# Patient Record
Sex: FEMALE | Race: OTHER | HISPANIC OR LATINO | URBAN - METROPOLITAN AREA
[De-identification: names, ages, dates, MRNs, and addresses within clinical notes are randomized per-mention and may not be internally consistent; named-entity substitution may affect disease eponyms.]

---

## 2019-03-20 ENCOUNTER — EMERGENCY (EMERGENCY)
Facility: HOSPITAL | Age: 84
LOS: 1 days | Discharge: HOME | End: 2019-03-20
Attending: EMERGENCY MEDICINE
Payer: MEDICARE

## 2019-03-20 VITALS
SYSTOLIC BLOOD PRESSURE: 156 MMHG | RESPIRATION RATE: 18 BRPM | DIASTOLIC BLOOD PRESSURE: 90 MMHG | HEART RATE: 92 BPM | TEMPERATURE: 98 F | OXYGEN SATURATION: 95 %

## 2019-03-20 VITALS — WEIGHT: 130.07 LBS | HEIGHT: 60 IN

## 2019-03-20 PROCEDURE — 73564 X-RAY EXAM KNEE 4 OR MORE: CPT | Mod: 26,RT

## 2019-03-20 PROCEDURE — 99284 EMERGENCY DEPT VISIT MOD MDM: CPT

## 2019-03-20 PROCEDURE — 73564 X-RAY EXAM KNEE 4 OR MORE: CPT

## 2019-03-20 PROCEDURE — 99283 EMERGENCY DEPT VISIT LOW MDM: CPT | Mod: 25

## 2019-03-20 RX ORDER — ACETAMINOPHEN 500 MG
650 TABLET ORAL ONCE
Qty: 0 | Refills: 0 | Status: COMPLETED | OUTPATIENT
Start: 2019-03-20 | End: 2019-03-20

## 2019-03-20 RX ADMIN — Medication 650 MILLIGRAM(S): at 15:01

## 2019-03-20 NOTE — ED PROVIDER NOTE - CLINICAL SUMMARY MEDICAL DECISION MAKING FREE TEXT BOX
89 y/o F with a PMHx of HTN and seizures presents to the ED with R knee pain s/p fall out of bed x 5 days. Will check xray, give pain control, and reassess. Pt last took Tylenol x 0800 this morning.

## 2019-03-20 NOTE — ED ADULT NURSE NOTE - NSIMPLEMENTINTERV_GEN_ALL_ED
Implemented All Universal Safety Interventions:  [place] to call system. Call bell, personal items and telephone within reach. Instruct patient to call for assistance. Room bathroom lighting operational. Non-slip footwear when patient is off stretcher. Physically safe environment: no spills, clutter or unnecessary equipment. Stretcher in lowest position, wheels locked, appropriate side rails in place.

## 2019-03-20 NOTE — ED PROVIDER NOTE - OBJECTIVE STATEMENT
89 y/o F with a significant PMHx of HTN and seizures, on Amlodipine, Benazapril, Vimpat, and Centraline, and no significant PSHx presents to the ED with c/o R knee pain x 5 days. Pt states she fell forward while getting out of bed. Pt notes she has been taking Tylenol for the pain, but with no relief. Pt reports he last dose was 0800 this morning. Pt endorses she usually walks around with a walker. Pt denies head injury, pain elsewhere, or any other complaints. NKDA.

## 2019-03-20 NOTE — ED PROVIDER NOTE - NSFOLLOWUPINSTRUCTIONS_ED_ALL_ED_FT
-- Please use 650-1000mg Tylenol (also called acetaminophen) every 6 hours and/or 400-600mg Motrin (also called Advil or ibuprofen) every 6 hours as needed for pain/discomfort/swelling. You can get these without a prescription. Don't use more than 3000mg of Tylenol in any 24-hour period. Make sure your other prescription/over-the-counter medications don't contain any Tylenol so you don't take too much. If you have any stomach discomfort while taking Motrin, you can use TUMS or Pepcid or Zantac (these can also be bought without a prescription).

## 2021-12-10 ENCOUNTER — EMERGENCY (EMERGENCY)
Facility: HOSPITAL | Age: 86
LOS: 1 days | Discharge: HOME | End: 2021-12-10
Attending: EMERGENCY MEDICINE
Payer: MEDICARE

## 2021-12-10 VITALS
HEART RATE: 94 BPM | TEMPERATURE: 99 F | DIASTOLIC BLOOD PRESSURE: 94 MMHG | HEIGHT: 60 IN | WEIGHT: 125 LBS | OXYGEN SATURATION: 97 % | SYSTOLIC BLOOD PRESSURE: 175 MMHG | RESPIRATION RATE: 18 BRPM

## 2021-12-10 VITALS
OXYGEN SATURATION: 96 % | RESPIRATION RATE: 18 BRPM | DIASTOLIC BLOOD PRESSURE: 73 MMHG | SYSTOLIC BLOOD PRESSURE: 115 MMHG | HEART RATE: 74 BPM | TEMPERATURE: 97 F

## 2021-12-10 LAB
ALBUMIN SERPL ELPH-MCNC: 3.4 G/DL — LOW (ref 3.5–5)
ALP SERPL-CCNC: 98 U/L — SIGNIFICANT CHANGE UP (ref 40–120)
ALT FLD-CCNC: 21 U/L DA — SIGNIFICANT CHANGE UP (ref 10–60)
ANION GAP SERPL CALC-SCNC: 5 MMOL/L — SIGNIFICANT CHANGE UP (ref 5–17)
AST SERPL-CCNC: 18 U/L — SIGNIFICANT CHANGE UP (ref 10–40)
BASOPHILS # BLD AUTO: 0.02 K/UL — SIGNIFICANT CHANGE UP (ref 0–0.2)
BASOPHILS NFR BLD AUTO: 0.4 % — SIGNIFICANT CHANGE UP (ref 0–2)
BILIRUB SERPL-MCNC: 0.6 MG/DL — SIGNIFICANT CHANGE UP (ref 0.2–1.2)
BUN SERPL-MCNC: 19 MG/DL — HIGH (ref 7–18)
CALCIUM SERPL-MCNC: 8.7 MG/DL — SIGNIFICANT CHANGE UP (ref 8.4–10.5)
CHLORIDE SERPL-SCNC: 109 MMOL/L — HIGH (ref 96–108)
CO2 SERPL-SCNC: 27 MMOL/L — SIGNIFICANT CHANGE UP (ref 22–31)
CREAT SERPL-MCNC: 0.84 MG/DL — SIGNIFICANT CHANGE UP (ref 0.5–1.3)
EOSINOPHIL # BLD AUTO: 0.1 K/UL — SIGNIFICANT CHANGE UP (ref 0–0.5)
EOSINOPHIL NFR BLD AUTO: 2.1 % — SIGNIFICANT CHANGE UP (ref 0–6)
GLUCOSE SERPL-MCNC: 95 MG/DL — SIGNIFICANT CHANGE UP (ref 70–99)
HCT VFR BLD CALC: 39.9 % — SIGNIFICANT CHANGE UP (ref 34.5–45)
HGB BLD-MCNC: 12.9 G/DL — SIGNIFICANT CHANGE UP (ref 11.5–15.5)
IMM GRANULOCYTES NFR BLD AUTO: 0.2 % — SIGNIFICANT CHANGE UP (ref 0–1.5)
LYMPHOCYTES # BLD AUTO: 1.17 K/UL — SIGNIFICANT CHANGE UP (ref 1–3.3)
LYMPHOCYTES # BLD AUTO: 24.5 % — SIGNIFICANT CHANGE UP (ref 13–44)
MCHC RBC-ENTMCNC: 27.9 PG — SIGNIFICANT CHANGE UP (ref 27–34)
MCHC RBC-ENTMCNC: 32.3 GM/DL — SIGNIFICANT CHANGE UP (ref 32–36)
MCV RBC AUTO: 86.4 FL — SIGNIFICANT CHANGE UP (ref 80–100)
MONOCYTES # BLD AUTO: 0.4 K/UL — SIGNIFICANT CHANGE UP (ref 0–0.9)
MONOCYTES NFR BLD AUTO: 8.4 % — SIGNIFICANT CHANGE UP (ref 2–14)
NEUTROPHILS # BLD AUTO: 3.07 K/UL — SIGNIFICANT CHANGE UP (ref 1.8–7.4)
NEUTROPHILS NFR BLD AUTO: 64.4 % — SIGNIFICANT CHANGE UP (ref 43–77)
NRBC # BLD: 0 /100 WBCS — SIGNIFICANT CHANGE UP (ref 0–0)
PLATELET # BLD AUTO: 122 K/UL — LOW (ref 150–400)
POTASSIUM SERPL-MCNC: 4.7 MMOL/L — SIGNIFICANT CHANGE UP (ref 3.5–5.3)
POTASSIUM SERPL-SCNC: 4.7 MMOL/L — SIGNIFICANT CHANGE UP (ref 3.5–5.3)
PROT SERPL-MCNC: 6.9 G/DL — SIGNIFICANT CHANGE UP (ref 6–8.3)
RBC # BLD: 4.62 M/UL — SIGNIFICANT CHANGE UP (ref 3.8–5.2)
RBC # FLD: 13.6 % — SIGNIFICANT CHANGE UP (ref 10.3–14.5)
SARS-COV-2 RNA SPEC QL NAA+PROBE: SIGNIFICANT CHANGE UP
SODIUM SERPL-SCNC: 141 MMOL/L — SIGNIFICANT CHANGE UP (ref 135–145)
TROPONIN I, HIGH SENSITIVITY RESULT: 9.8 NG/L — SIGNIFICANT CHANGE UP
WBC # BLD: 4.77 K/UL — SIGNIFICANT CHANGE UP (ref 3.8–10.5)
WBC # FLD AUTO: 4.77 K/UL — SIGNIFICANT CHANGE UP (ref 3.8–10.5)

## 2021-12-10 PROCEDURE — 80053 COMPREHEN METABOLIC PANEL: CPT

## 2021-12-10 PROCEDURE — 99285 EMERGENCY DEPT VISIT HI MDM: CPT

## 2021-12-10 PROCEDURE — 71045 X-RAY EXAM CHEST 1 VIEW: CPT | Mod: 26

## 2021-12-10 PROCEDURE — 84484 ASSAY OF TROPONIN QUANT: CPT

## 2021-12-10 PROCEDURE — 36415 COLL VENOUS BLD VENIPUNCTURE: CPT

## 2021-12-10 PROCEDURE — 93005 ELECTROCARDIOGRAM TRACING: CPT

## 2021-12-10 PROCEDURE — 87635 SARS-COV-2 COVID-19 AMP PRB: CPT

## 2021-12-10 PROCEDURE — 85025 COMPLETE CBC W/AUTO DIFF WBC: CPT

## 2021-12-10 PROCEDURE — 99284 EMERGENCY DEPT VISIT MOD MDM: CPT | Mod: 25

## 2021-12-10 PROCEDURE — 71045 X-RAY EXAM CHEST 1 VIEW: CPT

## 2021-12-10 NOTE — ED PROVIDER NOTE - PROGRESS NOTE DETAILS
Patient is resting comfortably, NAD. remains asymptomatic. Extensive discussion with daughter re: plan of care. Offered admission. Daughter declined. I recommended repeat troponin as an alternative. Daughter declined. Daughter understands that patient has not had a full cardiac evaluation and may still have a serious medical condition that could lead to permanent disability or death. Will take patient to PMD on Monday. Return to the ED immediately if getting worse, not improving, or if having any new or troubling symptoms.

## 2021-12-10 NOTE — ED PROVIDER NOTE - NSFOLLOWUPINSTRUCTIONS_ED_ALL_ED_FT
Chest Pain    WHAT YOU NEED TO KNOW:    Chest pain can be caused by a range of conditions, from not serious to life-threatening. Chest pain can be a symptom of a digestive problem, such as acid reflux or a stomach ulcer. An anxiety attack or a strong emotion, such as anger, can also cause chest pain. Infection, inflammation, or a fracture in the bones or cartilage in your chest can cause pain or discomfort. Sometimes chest pain or pressure is caused by poor blood flow to your heart (angina). Chest pain may also be caused by life-threatening conditions such as a heart attack or blood clot in your lungs.    DISCHARGE INSTRUCTIONS:    Call your local emergency number (911 in the [place]) or have someone call if:   •You have any of the following signs of a heart attack: ?Squeezing, pressure, or pain in your chest      ?You may also have any of the following: ?Discomfort or pain in your back, neck, jaw, stomach, or arm      ?Shortness of breath      ?Nausea or vomiting      ?Lightheadedness or a sudden cold sweat            Return to the emergency department if:   •You have chest discomfort that gets worse, even with medicine.      •You cough or vomit blood.      •Your bowel movements are black or bloody.      •You cannot stop vomiting, or it hurts to swallow.      Call your doctor if:   •You have questions or concerns about your condition or care.          Medicines:   •Medicines may be given to treat the cause of your chest pain. Examples include pain medicine, anxiety medicine, or medicines to increase blood flow to your heart.      •Do not take certain medicines without asking your healthcare provider first. These include NSAIDs, herbal or vitamin supplements, or hormones (estrogen or progestin).      •Take your medicine as directed. Contact your healthcare provider if you think your medicine is not helping or if you have side effects. Tell him or her if you are allergic to any medicine. Keep a list of the medicines, vitamins, and herbs you take. Include the amounts, and when and why you take them. Bring the list or the pill bottles to follow-up visits. Carry your medicine list with you in case of an emergency.      Healthy living tips: The following are general healthy guidelines. If the cause of your chest pain is known, your healthcare provider will give you specific guidelines to follow.  •Do not smoke. Nicotine and other chemicals in cigarettes and cigars can cause lung and heart damage. Ask your healthcare provider for information if you currently smoke and need help to quit. E-cigarettes or smokeless tobacco still contain nicotine. Talk to your healthcare provider before you use these products.      •Choose a variety of healthy foods as often as possible. Include fresh, frozen, or canned fruits and vegetables. Also include low-fat dairy products, fish, chicken (without skin), and lean meats. Your healthcare provider or a dietitian can help you create meal plans. You may need to avoid certain foods or drinks if your pain is caused by a digestion problem.  Healthy Foods           •Lower your sodium (salt) intake. Limit foods that are high in sodium, such as canned foods, salty snacks, and cold cuts. If you add salt when you cook food, do not add more at the table. Choose low-sodium canned foods as much as possible.             •Drink plenty of water every day. Water helps your body to control your temperature and blood pressure. Ask your healthcare provider how much water you should drink every day.      •Ask about activity. Your healthcare provider will tell you which activities to limit or avoid. Ask when you can drive, return to work, and have sex. Ask about the best exercise plan for you.      •Maintain a healthy weight. Ask your healthcare provider what a healthy weight is for you. Ask him or her to help you create a safe weight loss plan if you are overweight.      •Ask about vaccines you may need. Get the influenza (flu) vaccine every year as soon as recommended, usually in September or October. You may also need a pneumococcal vaccine to prevent pneumonia. The vaccine is usually given every 5 years, starting at age 65. Your healthcare provider can tell you if should get other vaccines, and when to get them.      Follow up with your healthcare provider within 72 hours, or as directed: You may need to return for more tests to find the cause of your chest pain. You may be referred to a specialist, such as a cardiologist or gastroenterologist. Write down your questions so you remember to ask them during your visits.       © Copyright [employer] 2021           back to top                          © Copyright [employer] 2021

## 2021-12-10 NOTE — ED PROVIDER NOTE - OBJECTIVE STATEMENT
patient's daughter reports patient work up with retrosternal chest pain this morning, moderate, lasted about 15 minutes. Patient burped several times afterwards, and pain resolved. Pain returned, mild, around 3pm, lasted about 5 minutes, also with burping and passing gas. No fever, sob, ap, n/v/d, diaphoresis. Currently asymptomatic. Ate normally today.

## 2021-12-10 NOTE — ED PROVIDER NOTE - PATIENT PORTAL LINK FT
You can access the FollowMyHealth Patient Portal offered by [facility] by registering at the following website: [email or web address]. By joining "[employer]’s FollowMyHealth portal, you will also be able to view your health information using other applications (apps) compatible with our system.

## 2021-12-11 PROBLEM — G40.909 EPILEPSY, UNSPECIFIED, NOT INTRACTABLE, WITHOUT STATUS EPILEPTICUS: Chronic | Status: ACTIVE | Noted: 2019-03-20

## 2021-12-11 PROBLEM — I10 ESSENTIAL (PRIMARY) HYPERTENSION: Chronic | Status: ACTIVE | Noted: 2019-03-20

## 2023-01-12 ENCOUNTER — INPATIENT (INPATIENT)
Facility: HOSPITAL | Age: 88
LOS: 4 days | Discharge: HOME | DRG: 177 | End: 2023-01-17
Attending: INTERNAL MEDICINE | Admitting: INTERNAL MEDICINE
Payer: MEDICARE

## 2023-01-12 VITALS
HEIGHT: 55 IN | OXYGEN SATURATION: 100 % | WEIGHT: 110.01 LBS | RESPIRATION RATE: 18 BRPM | TEMPERATURE: 99 F | DIASTOLIC BLOOD PRESSURE: 70 MMHG | SYSTOLIC BLOOD PRESSURE: 107 MMHG | HEART RATE: 110 BPM

## 2023-01-12 DIAGNOSIS — J96.01 ACUTE RESPIRATORY FAILURE WITH HYPOXIA: ICD-10-CM

## 2023-01-12 DIAGNOSIS — U07.1 COVID-19: ICD-10-CM

## 2023-01-12 DIAGNOSIS — I48.92 UNSPECIFIED ATRIAL FLUTTER: ICD-10-CM

## 2023-01-12 DIAGNOSIS — I10 ESSENTIAL (PRIMARY) HYPERTENSION: ICD-10-CM

## 2023-01-12 DIAGNOSIS — Z29.9 ENCOUNTER FOR PROPHYLACTIC MEASURES, UNSPECIFIED: ICD-10-CM

## 2023-01-12 DIAGNOSIS — F41.9 ANXIETY DISORDER, UNSPECIFIED: ICD-10-CM

## 2023-01-12 DIAGNOSIS — I48.91 UNSPECIFIED ATRIAL FIBRILLATION: ICD-10-CM

## 2023-01-12 LAB
ALBUMIN SERPL ELPH-MCNC: 3.2 G/DL — LOW (ref 3.5–5)
ALP SERPL-CCNC: 94 U/L — SIGNIFICANT CHANGE UP (ref 40–120)
ALT FLD-CCNC: 22 U/L DA — SIGNIFICANT CHANGE UP (ref 10–60)
ANION GAP SERPL CALC-SCNC: 5 MMOL/L — SIGNIFICANT CHANGE UP (ref 5–17)
AST SERPL-CCNC: 20 U/L — SIGNIFICANT CHANGE UP (ref 10–40)
BASOPHILS # BLD AUTO: 0.01 K/UL — SIGNIFICANT CHANGE UP (ref 0–0.2)
BASOPHILS NFR BLD AUTO: 0.2 % — SIGNIFICANT CHANGE UP (ref 0–2)
BILIRUB SERPL-MCNC: 0.7 MG/DL — SIGNIFICANT CHANGE UP (ref 0.2–1.2)
BUN SERPL-MCNC: 23 MG/DL — HIGH (ref 7–18)
CALCIUM SERPL-MCNC: 8.3 MG/DL — LOW (ref 8.4–10.5)
CHLORIDE SERPL-SCNC: 109 MMOL/L — HIGH (ref 96–108)
CO2 SERPL-SCNC: 25 MMOL/L — SIGNIFICANT CHANGE UP (ref 22–31)
CREAT SERPL-MCNC: 0.81 MG/DL — SIGNIFICANT CHANGE UP (ref 0.5–1.3)
EGFR: 67 ML/MIN/1.73M2 — SIGNIFICANT CHANGE UP
EOSINOPHIL # BLD AUTO: 0.05 K/UL — SIGNIFICANT CHANGE UP (ref 0–0.5)
EOSINOPHIL NFR BLD AUTO: 1.1 % — SIGNIFICANT CHANGE UP (ref 0–6)
FLUAV AG NPH QL: SIGNIFICANT CHANGE UP
FLUBV AG NPH QL: SIGNIFICANT CHANGE UP
GLUCOSE SERPL-MCNC: 99 MG/DL — SIGNIFICANT CHANGE UP (ref 70–99)
HCT VFR BLD CALC: 35 % — SIGNIFICANT CHANGE UP (ref 34.5–45)
HGB BLD-MCNC: 11.1 G/DL — LOW (ref 11.5–15.5)
IMM GRANULOCYTES NFR BLD AUTO: 0.2 % — SIGNIFICANT CHANGE UP (ref 0–0.9)
LACTATE SERPL-SCNC: 1.2 MMOL/L — SIGNIFICANT CHANGE UP (ref 0.7–2)
LYMPHOCYTES # BLD AUTO: 0.41 K/UL — LOW (ref 1–3.3)
LYMPHOCYTES # BLD AUTO: 8.7 % — LOW (ref 13–44)
MAGNESIUM SERPL-MCNC: 2.1 MG/DL — SIGNIFICANT CHANGE UP (ref 1.6–2.6)
MCHC RBC-ENTMCNC: 27.3 PG — SIGNIFICANT CHANGE UP (ref 27–34)
MCHC RBC-ENTMCNC: 31.7 GM/DL — LOW (ref 32–36)
MCV RBC AUTO: 86 FL — SIGNIFICANT CHANGE UP (ref 80–100)
MONOCYTES # BLD AUTO: 0.28 K/UL — SIGNIFICANT CHANGE UP (ref 0–0.9)
MONOCYTES NFR BLD AUTO: 6 % — SIGNIFICANT CHANGE UP (ref 2–14)
NEUTROPHILS # BLD AUTO: 3.94 K/UL — SIGNIFICANT CHANGE UP (ref 1.8–7.4)
NEUTROPHILS NFR BLD AUTO: 83.8 % — HIGH (ref 43–77)
NRBC # BLD: 0 /100 WBCS — SIGNIFICANT CHANGE UP (ref 0–0)
NT-PROBNP SERPL-SCNC: 2401 PG/ML — HIGH (ref 0–450)
PLATELET # BLD AUTO: 67 K/UL — LOW (ref 150–400)
POTASSIUM SERPL-MCNC: 3.9 MMOL/L — SIGNIFICANT CHANGE UP (ref 3.5–5.3)
POTASSIUM SERPL-SCNC: 3.9 MMOL/L — SIGNIFICANT CHANGE UP (ref 3.5–5.3)
PROT SERPL-MCNC: 5.9 G/DL — LOW (ref 6–8.3)
RBC # BLD: 4.07 M/UL — SIGNIFICANT CHANGE UP (ref 3.8–5.2)
RBC # FLD: 13.8 % — SIGNIFICANT CHANGE UP (ref 10.3–14.5)
SARS-COV-2 RNA SPEC QL NAA+PROBE: DETECTED
SODIUM SERPL-SCNC: 139 MMOL/L — SIGNIFICANT CHANGE UP (ref 135–145)
T3 SERPL-MCNC: 83 NG/DL — SIGNIFICANT CHANGE UP (ref 80–200)
T4 AB SER-ACNC: 6.7 UG/DL — SIGNIFICANT CHANGE UP (ref 4.6–12)
TROPONIN I, HIGH SENSITIVITY RESULT: 51.1 NG/L — SIGNIFICANT CHANGE UP
TSH SERPL-MCNC: 1.66 UU/ML — SIGNIFICANT CHANGE UP (ref 0.34–4.82)
WBC # BLD: 4.7 K/UL — SIGNIFICANT CHANGE UP (ref 3.8–10.5)
WBC # FLD AUTO: 4.7 K/UL — SIGNIFICANT CHANGE UP (ref 3.8–10.5)

## 2023-01-12 PROCEDURE — 99285 EMERGENCY DEPT VISIT HI MDM: CPT

## 2023-01-12 PROCEDURE — 71045 X-RAY EXAM CHEST 1 VIEW: CPT | Mod: 26

## 2023-01-12 PROCEDURE — 93010 ELECTROCARDIOGRAM REPORT: CPT

## 2023-01-12 RX ORDER — REMDESIVIR 5 MG/ML
200 INJECTION INTRAVENOUS EVERY 24 HOURS
Refills: 0 | Status: COMPLETED | OUTPATIENT
Start: 2023-01-12 | End: 2023-01-12

## 2023-01-12 RX ORDER — ENOXAPARIN SODIUM 100 MG/ML
40 INJECTION SUBCUTANEOUS EVERY 24 HOURS
Refills: 0 | Status: DISCONTINUED | OUTPATIENT
Start: 2023-01-12 | End: 2023-01-12

## 2023-01-12 RX ORDER — PANTOPRAZOLE SODIUM 20 MG/1
40 TABLET, DELAYED RELEASE ORAL
Refills: 0 | Status: DISCONTINUED | OUTPATIENT
Start: 2023-01-12 | End: 2023-01-17

## 2023-01-12 RX ORDER — CEFTRIAXONE 500 MG/1
1000 INJECTION, POWDER, FOR SOLUTION INTRAMUSCULAR; INTRAVENOUS ONCE
Refills: 0 | Status: COMPLETED | OUTPATIENT
Start: 2023-01-12 | End: 2023-01-12

## 2023-01-12 RX ORDER — ENOXAPARIN SODIUM 100 MG/ML
50 INJECTION SUBCUTANEOUS ONCE
Refills: 0 | Status: DISCONTINUED | OUTPATIENT
Start: 2023-01-12 | End: 2023-01-12

## 2023-01-12 RX ORDER — ONDANSETRON 8 MG/1
4 TABLET, FILM COATED ORAL EVERY 8 HOURS
Refills: 0 | Status: DISCONTINUED | OUTPATIENT
Start: 2023-01-12 | End: 2023-01-17

## 2023-01-12 RX ORDER — REMDESIVIR 5 MG/ML
INJECTION INTRAVENOUS
Refills: 0 | Status: DISCONTINUED | OUTPATIENT
Start: 2023-01-12 | End: 2023-01-13

## 2023-01-12 RX ORDER — OXYBUTYNIN CHLORIDE 5 MG
10 TABLET ORAL DAILY
Refills: 0 | Status: DISCONTINUED | OUTPATIENT
Start: 2023-01-12 | End: 2023-01-12

## 2023-01-12 RX ORDER — REMDESIVIR 5 MG/ML
100 INJECTION INTRAVENOUS EVERY 24 HOURS
Refills: 0 | Status: DISCONTINUED | OUTPATIENT
Start: 2023-01-13 | End: 2023-01-13

## 2023-01-12 RX ORDER — AZITHROMYCIN 500 MG/1
500 TABLET, FILM COATED ORAL ONCE
Refills: 0 | Status: COMPLETED | OUTPATIENT
Start: 2023-01-12 | End: 2023-01-12

## 2023-01-12 RX ORDER — OXYBUTYNIN CHLORIDE 5 MG
10 TABLET ORAL DAILY
Refills: 0 | Status: DISCONTINUED | OUTPATIENT
Start: 2023-01-12 | End: 2023-01-17

## 2023-01-12 RX ORDER — SODIUM CHLORIDE 9 MG/ML
1000 INJECTION INTRAMUSCULAR; INTRAVENOUS; SUBCUTANEOUS
Refills: 0 | Status: DISCONTINUED | OUTPATIENT
Start: 2023-01-12 | End: 2023-01-17

## 2023-01-12 RX ORDER — SERTRALINE 25 MG/1
50 TABLET, FILM COATED ORAL DAILY
Refills: 0 | Status: DISCONTINUED | OUTPATIENT
Start: 2023-01-12 | End: 2023-01-17

## 2023-01-12 RX ORDER — SODIUM CHLORIDE 9 MG/ML
500 INJECTION INTRAMUSCULAR; INTRAVENOUS; SUBCUTANEOUS ONCE
Refills: 0 | Status: COMPLETED | OUTPATIENT
Start: 2023-01-12 | End: 2023-01-12

## 2023-01-12 RX ORDER — LANOLIN ALCOHOL/MO/W.PET/CERES
3 CREAM (GRAM) TOPICAL AT BEDTIME
Refills: 0 | Status: DISCONTINUED | OUTPATIENT
Start: 2023-01-12 | End: 2023-01-17

## 2023-01-12 RX ORDER — LACOSAMIDE 50 MG/1
100 TABLET ORAL
Refills: 0 | Status: DISCONTINUED | OUTPATIENT
Start: 2023-01-12 | End: 2023-01-17

## 2023-01-12 RX ORDER — DEXAMETHASONE 0.5 MG/5ML
6 ELIXIR ORAL DAILY
Refills: 0 | Status: DISCONTINUED | OUTPATIENT
Start: 2023-01-12 | End: 2023-01-13

## 2023-01-12 RX ORDER — ENOXAPARIN SODIUM 100 MG/ML
50 INJECTION SUBCUTANEOUS EVERY 12 HOURS
Refills: 0 | Status: ACTIVE | OUTPATIENT
Start: 2023-01-12 | End: 2023-12-11

## 2023-01-12 RX ORDER — ACETAMINOPHEN 500 MG
650 TABLET ORAL EVERY 6 HOURS
Refills: 0 | Status: DISCONTINUED | OUTPATIENT
Start: 2023-01-12 | End: 2023-01-17

## 2023-01-12 RX ADMIN — SODIUM CHLORIDE 500 MILLILITER(S): 9 INJECTION INTRAMUSCULAR; INTRAVENOUS; SUBCUTANEOUS at 17:00

## 2023-01-12 RX ADMIN — ENOXAPARIN SODIUM 50 MILLIGRAM(S): 100 INJECTION SUBCUTANEOUS at 18:53

## 2023-01-12 RX ADMIN — LACOSAMIDE 100 MILLIGRAM(S): 50 TABLET ORAL at 18:53

## 2023-01-12 RX ADMIN — REMDESIVIR 200 MILLIGRAM(S): 5 INJECTION INTRAVENOUS at 18:54

## 2023-01-12 RX ADMIN — SODIUM CHLORIDE 60 MILLILITER(S): 9 INJECTION INTRAMUSCULAR; INTRAVENOUS; SUBCUTANEOUS at 21:50

## 2023-01-12 RX ADMIN — AZITHROMYCIN 255 MILLIGRAM(S): 500 TABLET, FILM COATED ORAL at 13:51

## 2023-01-12 RX ADMIN — CEFTRIAXONE 100 MILLIGRAM(S): 500 INJECTION, POWDER, FOR SOLUTION INTRAMUSCULAR; INTRAVENOUS at 12:58

## 2023-01-12 RX ADMIN — CEFTRIAXONE 1000 MILLIGRAM(S): 500 INJECTION, POWDER, FOR SOLUTION INTRAMUSCULAR; INTRAVENOUS at 13:28

## 2023-01-12 RX ADMIN — AZITHROMYCIN 500 MILLIGRAM(S): 500 TABLET, FILM COATED ORAL at 14:51

## 2023-01-12 NOTE — H&P ADULT - PROBLEM SELECTOR PLAN 4
Continue home medication  Sertraline 50mg BID Continue home medication Amlodipine 2.5 and Benazapril 10mg  BP soft  holding off on anti-hypertensive medications given soft BP  resumed once clinically indicated

## 2023-01-12 NOTE — ED PROVIDER NOTE - PROGRESS NOTE DETAILS
pt's blood work, CXR reviewed with pt & daughter.  EKG interpreted by me.  Pt with new onset A.fib/flutter, acute bronchitis, atypical CP, will admit.  case d/w Dr. Ureña

## 2023-01-12 NOTE — H&P ADULT - PROBLEM SELECTOR PLAN 2
Patient had son-in-law exposure  Tested positive  On Room Air  No signs of respiratory distress  [ ] Supportive measures 2/2 covid   supplemental oxygen via [place]  goal o2sat >95%  wean off as tolerated  obtain ambulatory O2Sat when pt able to tolerate

## 2023-01-12 NOTE — H&P ADULT - PROBLEM SELECTOR PLAN 3
Continue home medication  Amlodipine 2.5  Benazapril 10mg Patient had son-in-law exposure  Tested positive  O2Sat 88-92  supplemental oxygen via [place]  started on remdesevir and decadron   Supportive measures  f/u covid markers

## 2023-01-12 NOTE — H&P ADULT - PROBLEM SELECTOR PROBLEM 3
[facility] [address] Patient awake resting in bed. Respirations present. On 1 L NC. No signs of distress. AxO X4. No needs expressed. Bed low and locked. Call light within reach. Will continue to monitor. HTN (hypertension) 2019 novel coronavirus disease (COVID-19)

## 2023-01-12 NOTE — ED ADULT TRIAGE NOTE - HAVE YOU RECEIVED AT LEAST TWO PFIZER AND/OR MODERNA VACCINATIONS (IN ANY COMBINATION) AND/OR ONE JOHNSON & JOHNSON VACCINATION?
Data: Christina Orosco transferred to [facility] via wheelchair at 1900. Baby transferred via parent's arms.  Action: Receiving unit notified of transfer: Yes. Patient and family notified of room change. Report given to VANESSA Mccarty at 1915. Belongings sent to receiving unit. Accompanied by Registered Nurse. Oriented patient to surroundings. Call light within reach. ID bands double-checked with receiving RN.  Response: Patient tolerated transfer and is stable.   Yes

## 2023-01-12 NOTE — H&P ADULT - HISTORY OF PRESENT ILLNESS
89F from home, ambulates with walker here for a 3 day history of chest pain, SOB. Patient stated that she has a son-in-law with the flu. COVID tests outside were negative. Cough was productive initally, now dry. Pain is worse with cough, diffuse around the chest. She comes into the ED with SOB.    Denies fevers, chills, NVD, endorses less PO intake.    In the ED, EKG showed new onset aflutter, CXR showed   IMPRESSION:  1. No acute cardiopulmonary abnormalities are seen.  2. Chronic elevation of the right hemidiaphragm.  3. Atherosclerosis.  4. Degenerative changes of the spine and shoulders along with upward   subluxation of the right humeral head. 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    Trop Negative  ProBNP slightly elevated  COVID Positive 89F from home, AAOx3,  ambulates with walker here for a 3 day history of chest pain, SOB. Patient stated that she has a son-in-law with the flu. COVID tests outside were negative. Cough was productive initially now dry. Pain is worse with cough, diffuse around the chest.  As per pt's daughter, the pt underwent OP cardiology w/u studies (including EKG and TTE)  last September which were all reported as normal.     At the time of assessment pt reports feeling ok, Denies fevers, chills,  SOB, chest pain, N/V, swelling  in the legs.  Endorses less PO intake. Covid vaccinated X 2 (last dose received on 12/21 as per pt's daughter)    In the ED, EKG showed new onset aflutter, CXR showed   IMPRESSION:  1. No acute cardiopulmonary abnormalities are seen.  2. Chronic elevation of the right hemidiaphragm.  3. Atherosclerosis.  4. Degenerative changes of the spine and shoulders along with upward   subluxation of the right humeral head. 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    Trop Negative  ProBNP slightly elevated  COVID Positive

## 2023-01-12 NOTE — ED ADULT NURSE NOTE - EXTENSIONS OF SELF_ADULT
None [Eyesight Problems] : eyesight problems [Heart Rate Is Fast] : fast heart rate [Shortness Of Breath] : shortness of breath [SOB on Exertion] : shortness of breath during exertion [Heartburn] : heartburn [Incontinence] : incontinence [Joint Pain] : joint pain [Joint Swelling] : joint swelling [Joint Stiffness] : joint stiffness [Easy Bruising] : a tendency for easy bruising [Skin Wound] : skin wound [Negative] : Endocrine

## 2023-01-12 NOTE — H&P ADULT - NSHPPHYSICALEXAM_GEN_ALL_CORE
GENERAL: NAD, well-groomed, well-developed  HEAD:  Atraumatic, Normocephalic  EYES: EOMI, PERRLA, conjunctiva and sclera clear  ENMT: No tonsillar erythema, exudates, or enlargement; Dry mucous membranes  NECK: Supple, normal appearance, No JVD;  NERVOUS SYSTEM:  Alert & Oriented X3,  Motor Strength 5/5 B/L upper and lower extremities, sensation intact  CHEST/LUNG: Lungs clear to auscultation bilaterally,  HEART: Irregular rate and rhythm; No murmurs, rubs, or gallops  ABDOMEN: Soft, Nontender, Nondistended; Bowel sounds present  EXTREMITIES:  2+ Peripheral Pulses, No clubbing, cyanosis. Mild edema on lower extremties  LYMPH: No lymphadenopathy noted  SKIN: No rashes or lesions;  Good capillary refill GENERAL: NAD, well-groomed, well-developed  HEAD:  Atraumatic, Normocephalic  EYES: EOMI, PERRLA, conjunctiva and sclera clear  ENMT: No tonsillar erythema, exudates, or enlargement; Dry mucous membranes  NECK: Supple, normal appearance, No JVD;  NERVOUS SYSTEM:  Alert & Oriented X3,  Motor Strength 5/5 B/L upper and lower extremities, sensation intact  CHEST/LUNG: Lungs clear to auscultation bilaterally,  HEART: Irregular rate and rhythm; Low grade systolic murmur, rubs, or gallops  ABDOMEN: Soft, Nontender, Nondistended; Bowel sounds present  EXTREMITIES:  2+ Peripheral Pulses, No clubbing, cyanosis. Mild edema on lower extremities non-pitting  LYMPH: No lymphadenopathy noted  SKIN: No rashes or lesions;  Good capillary refill

## 2023-01-12 NOTE — H&P ADULT - ASSESSMENT
89F from home, ambulates with walker here for a 3 day history of chest pain, SOB, found to be COVID positive, and have new onset aflutter. 89F from home, ambulates with walker here for a 3 day history of chest pain, SOB, concerning for acute hypoxic respiratory failure, found to be COVID positive, and have new onset aflutter.

## 2023-01-12 NOTE — ED ADULT NURSE NOTE - OBJECTIVE STATEMENT
Pt arrived from home , accompanied by daughter, who states pt c/o chest pain . SOB and generalized weakness since yesterday

## 2023-01-12 NOTE — H&P ADULT - NSHPREVIEWOFSYSTEMS_GEN_ALL_CORE
CONSTITUTIONAL: No fever, weight loss, or fatigue  RESPIRATORY: Cough, SOB  CARDIOVASCULAR: Chest pain, No, palpitations, dizziness, or leg swelling  GASTROINTESTINAL: No abdominal pain. No nausea, vomiting, or hematemesis; No diarrhea or constipation. No melena or hematochezia.  GENITOURINARY: No dysuria or hematuria, urinary frequency  NEUROLOGICAL: Headaches, memory loss, loss of strength, numbness, or tremors  ENDOCRINE: No polyuria, polydipsia, or heat/cold intolerance  MUSCULOSKELETAL: No muscle aches, joint pains  HEME: no easy bruisability, no tender or enlarged lymph nodes  SKIN: No itching, burning, rashes, or lesions .

## 2023-01-12 NOTE — ED PROVIDER NOTE - OBJECTIVE STATEMENT
Informant step daughter @ bedside 89 y.o. female who lives with her step daughter, pt with coughing for past 3 days, dry cough, sob, chest pain/back pain upon coughing, chills, no appetite, weakness, nasal congestion, o fever, n/v, pt has 2 COVID vaccine/flu vaccine.  Given tylenol 11/2hrs PTA, Pt's son in law was Dx with Flu.  Pt ambulates with a walker

## 2023-01-12 NOTE — ED ADULT NURSE NOTE - NSIMPLEMENTINTERV_GEN_ALL_ED
Implemented All Fall with Harm Risk Interventions:  [place] to call system. Call bell, personal items and telephone within reach. Instruct patient to call for assistance. Room bathroom lighting operational. Non-slip footwear when patient is off stretcher. Physically safe environment: no spills, clutter or unnecessary equipment. Stretcher in lowest position, wheels locked, appropriate side rails in place. Provide visual cue, wrist band, yellow gown, etc. Monitor gait and stability. Monitor for mental status changes and reorient to person, place, and time. Review medications for side effects contributing to fall risk. Reinforce activity limits and safety measures with patient and family. Provide visual clues: red socks.

## 2023-01-12 NOTE — ED PROVIDER NOTE - CLINICAL SUMMARY MEDICAL DECISION MAKING FREE TEXT BOX
Patient with dry cough, decreased appetite for past 3 days, known sick contacts at home.  Patient appears to be dehydrated, also noted heart rate very irregular.  EKG showed A.Fib/flutter which stepdaughter denies of any history.  Patient's chest pain and back pain mostly secondary to coughing.  Concern for viral syndrome, pneumonia, bronchitis.  Will get labs, chest x-ray, give antibiotics and admission

## 2023-01-12 NOTE — H&P ADULT - PROBLEM SELECTOR PLAN 1
No cardiac history, dehydrated  Patient found to have new onset atrial flutter on EKG  Trop Negative  Rate is   [ ] Fluids  [ ] Admit to Telemetry  [ ] Cardio Consult No cardiac history, dehydrated  Patient found to have new onset atrial flutter on EKG  Trop Negative  remote telemetry  started on FD AC with lovenox. Pt's daughter Beronica aware of risk and benefits of AC. In agreement to start FD anticoagulation  f/u TTE  [ ] Cardio Consult No cardiac history, dehydrated  Patient found to have new onset atrial flutter on EKG  Trop Negative  remote telemetry  started on FD AC with lovenox. Pt's daughter Beronica aware of risk and benefits of AC. In agreement to start FD anticoagulation  holding BB for now, given soft BP   f/u TTE  [ ] Cardio Consult No cardiac history, dehydrated  Patient found to have new onset atrial flutter on EKG  Trop Negative  remote telemetry  started on FD AC with lovenox. Pt's daughter Beronica aware of risk and benefits of AC. In agreement to start FD anticoagulation  holding BB for now, given soft BP   f/u TTE  [ ] Cardio Consult: Dr. Kimball

## 2023-01-12 NOTE — ED PROVIDER NOTE - CARE PLAN
1 Principal Discharge DX:	New onset atrial fibrillation  Secondary Diagnosis:	Bronchitis  Secondary Diagnosis:	Atypical chest pain

## 2023-01-13 DIAGNOSIS — Z02.9 ENCOUNTER FOR ADMINISTRATIVE EXAMINATIONS, UNSPECIFIED: ICD-10-CM

## 2023-01-13 DIAGNOSIS — R56.9 UNSPECIFIED CONVULSIONS: ICD-10-CM

## 2023-01-13 DIAGNOSIS — R33.9 RETENTION OF URINE, UNSPECIFIED: ICD-10-CM

## 2023-01-13 LAB
A1C WITH ESTIMATED AVERAGE GLUCOSE RESULT: 5.6 % — SIGNIFICANT CHANGE UP (ref 4–5.6)
ALBUMIN SERPL ELPH-MCNC: 3 G/DL — LOW (ref 3.5–5)
ALP SERPL-CCNC: 100 U/L — SIGNIFICANT CHANGE UP (ref 40–120)
ALT FLD-CCNC: 21 U/L DA — SIGNIFICANT CHANGE UP (ref 10–60)
ANION GAP SERPL CALC-SCNC: 9 MMOL/L — SIGNIFICANT CHANGE UP (ref 5–17)
AST SERPL-CCNC: 18 U/L — SIGNIFICANT CHANGE UP (ref 10–40)
BASOPHILS # BLD AUTO: 0.01 K/UL — SIGNIFICANT CHANGE UP (ref 0–0.2)
BASOPHILS NFR BLD AUTO: 0.2 % — SIGNIFICANT CHANGE UP (ref 0–2)
BILIRUB SERPL-MCNC: 0.5 MG/DL — SIGNIFICANT CHANGE UP (ref 0.2–1.2)
BUN SERPL-MCNC: 15 MG/DL — SIGNIFICANT CHANGE UP (ref 7–18)
CALCIUM SERPL-MCNC: 8.5 MG/DL — SIGNIFICANT CHANGE UP (ref 8.4–10.5)
CHLORIDE SERPL-SCNC: 106 MMOL/L — SIGNIFICANT CHANGE UP (ref 96–108)
CHOLEST SERPL-MCNC: 95 MG/DL — SIGNIFICANT CHANGE UP
CO2 SERPL-SCNC: 23 MMOL/L — SIGNIFICANT CHANGE UP (ref 22–31)
CREAT SERPL-MCNC: 0.71 MG/DL — SIGNIFICANT CHANGE UP (ref 0.5–1.3)
CRP SERPL-MCNC: 38 MG/L — HIGH
D DIMER BLD IA.RAPID-MCNC: 211 NG/ML DDU — SIGNIFICANT CHANGE UP
EGFR: 79 ML/MIN/1.73M2 — SIGNIFICANT CHANGE UP
EOSINOPHIL # BLD AUTO: 0.04 K/UL — SIGNIFICANT CHANGE UP (ref 0–0.5)
EOSINOPHIL NFR BLD AUTO: 0.8 % — SIGNIFICANT CHANGE UP (ref 0–6)
ESTIMATED AVERAGE GLUCOSE: 114 MG/DL — SIGNIFICANT CHANGE UP (ref 68–114)
FERRITIN SERPL-MCNC: 34 NG/ML — SIGNIFICANT CHANGE UP (ref 15–150)
GLUCOSE SERPL-MCNC: 109 MG/DL — HIGH (ref 70–99)
HCT VFR BLD CALC: 38.6 % — SIGNIFICANT CHANGE UP (ref 34.5–45)
HDLC SERPL-MCNC: 65 MG/DL — SIGNIFICANT CHANGE UP
HGB BLD-MCNC: 12 G/DL — SIGNIFICANT CHANGE UP (ref 11.5–15.5)
IMM GRANULOCYTES NFR BLD AUTO: 0.2 % — SIGNIFICANT CHANGE UP (ref 0–0.9)
LDH SERPL L TO P-CCNC: 187 U/L — SIGNIFICANT CHANGE UP (ref 120–225)
LIPID PNL WITH DIRECT LDL SERPL: 19 MG/DL — SIGNIFICANT CHANGE UP
LYMPHOCYTES # BLD AUTO: 1.15 K/UL — SIGNIFICANT CHANGE UP (ref 1–3.3)
LYMPHOCYTES # BLD AUTO: 22 % — SIGNIFICANT CHANGE UP (ref 13–44)
MCHC RBC-ENTMCNC: 26.3 PG — LOW (ref 27–34)
MCHC RBC-ENTMCNC: 31.1 GM/DL — LOW (ref 32–36)
MCV RBC AUTO: 84.6 FL — SIGNIFICANT CHANGE UP (ref 80–100)
MONOCYTES # BLD AUTO: 0.47 K/UL — SIGNIFICANT CHANGE UP (ref 0–0.9)
MONOCYTES NFR BLD AUTO: 9 % — SIGNIFICANT CHANGE UP (ref 2–14)
NEUTROPHILS # BLD AUTO: 3.55 K/UL — SIGNIFICANT CHANGE UP (ref 1.8–7.4)
NEUTROPHILS NFR BLD AUTO: 67.8 % — SIGNIFICANT CHANGE UP (ref 43–77)
NON HDL CHOLESTEROL: 30 MG/DL — SIGNIFICANT CHANGE UP
NRBC # BLD: 0 /100 WBCS — SIGNIFICANT CHANGE UP (ref 0–0)
PLATELET # BLD AUTO: 62 K/UL — LOW (ref 150–400)
POTASSIUM SERPL-MCNC: 3.8 MMOL/L — SIGNIFICANT CHANGE UP (ref 3.5–5.3)
POTASSIUM SERPL-SCNC: 3.8 MMOL/L — SIGNIFICANT CHANGE UP (ref 3.5–5.3)
PROT SERPL-MCNC: 6.3 G/DL — SIGNIFICANT CHANGE UP (ref 6–8.3)
RBC # BLD: 4.56 M/UL — SIGNIFICANT CHANGE UP (ref 3.8–5.2)
RBC # FLD: 13.8 % — SIGNIFICANT CHANGE UP (ref 10.3–14.5)
SODIUM SERPL-SCNC: 138 MMOL/L — SIGNIFICANT CHANGE UP (ref 135–145)
TRIGL SERPL-MCNC: 53 MG/DL — SIGNIFICANT CHANGE UP
WBC # BLD: 5.23 K/UL — SIGNIFICANT CHANGE UP (ref 3.8–10.5)
WBC # FLD AUTO: 5.23 K/UL — SIGNIFICANT CHANGE UP (ref 3.8–10.5)

## 2023-01-13 RX ORDER — METOPROLOL TARTRATE 50 MG
12.5 TABLET ORAL EVERY 12 HOURS
Refills: 0 | Status: DISCONTINUED | OUTPATIENT
Start: 2023-01-13 | End: 2023-01-17

## 2023-01-13 RX ORDER — APIXABAN 2.5 MG/1
2.5 TABLET, FILM COATED ORAL EVERY 12 HOURS
Refills: 0 | Status: DISCONTINUED | OUTPATIENT
Start: 2023-01-14 | End: 2023-01-17

## 2023-01-13 RX ORDER — ALBUTEROL 90 UG/1
2.5 AEROSOL, METERED ORAL EVERY 6 HOURS
Refills: 0 | Status: ACTIVE | OUTPATIENT
Start: 2023-01-13 | End: 2023-12-12

## 2023-01-13 RX ADMIN — ENOXAPARIN SODIUM 50 MILLIGRAM(S): 100 INJECTION SUBCUTANEOUS at 05:58

## 2023-01-13 RX ADMIN — Medication 12.5 MILLIGRAM(S): at 05:58

## 2023-01-13 RX ADMIN — ENOXAPARIN SODIUM 50 MILLIGRAM(S): 100 INJECTION SUBCUTANEOUS at 18:10

## 2023-01-13 RX ADMIN — Medication 6 MILLIGRAM(S): at 05:59

## 2023-01-13 RX ADMIN — LACOSAMIDE 100 MILLIGRAM(S): 50 TABLET ORAL at 18:10

## 2023-01-13 RX ADMIN — SERTRALINE 50 MILLIGRAM(S): 25 TABLET, FILM COATED ORAL at 12:59

## 2023-01-13 RX ADMIN — PANTOPRAZOLE SODIUM 40 MILLIGRAM(S): 20 TABLET, DELAYED RELEASE ORAL at 05:56

## 2023-01-13 RX ADMIN — Medication 10 MILLIGRAM(S): at 12:59

## 2023-01-13 RX ADMIN — LACOSAMIDE 100 MILLIGRAM(S): 50 TABLET ORAL at 05:55

## 2023-01-13 NOTE — PATIENT PROFILE ADULT - FALL HARM RISK - HARM RISK INTERVENTIONS

## 2023-01-13 NOTE — PROGRESS NOTE ADULT - ASSESSMENT
89 y/o F from home, hx of covid vaccine x2, ambulates with walker here for a 3 day history of chest pain, SOB, concerning for acute hypoxic respiratory failure, found to be COVID positive and was admitted to telemetry due to new onset aflutter. Cardiology Dr. Kimball following. CXR no PNA. currently on full dose lovenox, switch to eliquis 2.5 mg bid.   s/p decadron and remdesivir (1/12-1/13). Started on prednisone taper. Pulm Dr. Rios following.   Now transitioned to room air. Pending echocardiogram.     During hospitalization, pt had urinary retention and wells was placed on 1/12.

## 2023-01-13 NOTE — PROGRESS NOTE ADULT - ASSESSMENT
89 y/o F from home, hx of covid vaccine x2, ambulates with walker here for a 3 day history of chest pain, SOB, concerning for acute hypoxic respiratory failure, found to be COVID positive and was admitted to telemetry due to new onset aflutter. Cardiology Dr. Kimball following. CXR no PNA. currently on full dose lovenox, plan to switch to eliquis 2.5 mg bid.   s/p decadron and remdesivir (1/12-1/13). Started on prednisone taper. Pulm Dr. Rios following.   Now transitioned to room air. Pending echocardiogram.     During hospitalization, pt had urinary retention and wells was placed on 1/12.

## 2023-01-13 NOTE — PROGRESS NOTE ADULT - PROBLEM SELECTOR PLAN 9
after remdesivir  likely dispo back home, uses walker  will need PT consult  TOV dc on eliquis 2.5 mg bid  likely dispo back home, uses walker  will need PT consult  TOV

## 2023-01-13 NOTE — PROGRESS NOTE ADULT - ASSESSMENT
89 y/o F from home, hx of covid vaccine x2, ambulates with walker here for a 3 day history of chest pain, SOB, concerning for acute hypoxic respiratory failure, found to be COVID positive and was admitted to telemetry due to new onset aflutter. Cardiology Dr. Kimball following. CXR no PNA. currently on full dose lovenox, plan to switch to eliquis 2.5 mg bid.   s/p decadron and remdesivir (1/12-1/13). Started on prednisone taper. Pulm Dr. Rios following.   Now transitioned to room air. Pending echocardiogram.     During hospitalization, pt had urinary retention and wells was placed on 1/12.      89 y/o F from home, hx of covid vaccine x2, ambulates with walker here for a 3 day history of chest pain, SOB, concerning for acute hypoxic respiratory failure, found to be COVID positive and was admitted to telemetry due to new onset aflutter. Cardiology Dr. Kimball following. CXR no PNA. currently on full dose lovenox, switch to eliquis 2.5 mg bid.   s/p decadron and remdesivir (1/12-1/13). Started on prednisone taper. Pulm Dr. Rios following.   Now transitioned to room air. Pending echocardiogram.     During hospitalization, pt had urinary retention and wells was placed on 1/12.

## 2023-01-13 NOTE — CONSULT NOTE ADULT - SUBJECTIVE AND OBJECTIVE BOX
PATIENT SEEN AND EXAMINED ON :- 1/12/23  DATE OF SERVICE:    1/12/23         Interim events noted,Labs ,Radiological studies and Cardiology tests reviewed .         HOSPITAL COURSE: HPI:  89F from home, AAOx3,  ambulates with walker here for a 3 day history of chest pain, SOB. Patient stated that she has a son-in-law with the flu. COVID tests outside were negative. Cough was productive initially now dry. Pain is worse with cough, diffuse around the chest.  As per pt's daughter, the pt underwent OP cardiology w/u studies (including EKG and TTE)  last September which were all reported as normal.     At the time of assessment pt reports feeling ok, Denies fevers, chills,  SOB, chest pain, N/V, swelling  in the legs.  Endorses less PO intake. Covid vaccinated X 2 (last dose received on 12/21 as per pt's daughter)    In the ED, EKG showed new onset aflutter, CXR showed   IMPRESSION:  1. No acute cardiopulmonary abnormalities are seen.  2. Chronic elevation of the right hemidiaphragm.  3. Atherosclerosis.  4. Degenerative changes of the spine and shoulders along with upward   subluxation of the right humeral head. 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    Trop Negative  ProBNP slightly elevated  COVID Positive (12 Jan 2023 15:40)      INTERIM EVENTS:Patient seen at bedside ,interim events noted.      PMH -reviewed admission note, no change since admission  HEART FAILURE: Acute[ ]Chronic[ ] Systolic[ ] Diastolic[ ] Combined Systolic and Diastolic[ ]  CAD[ ] CABG[ ] PCI[ ]  DEVICES[ ] PPM[ ] ICD[ ] ILR[ ]  ATRIAL FIBRILLATION[ ] Paroxysmal[ ] Permanent[ ] CHADS2-[  ]  CECILE[ ] CKD1[ ] CKD2[ ] CKD3[ ] CKD4[ ] ESRD[ ]  COPD[ ] HTN[ ]   DM[ ] Type1[ ] Type 2[ ]   CVA[ ] Paresis[ ]    AMBULATION: Assisted[ ] Cane/walker[ ] Independent[ ]    MEDICATIONS  (STANDING):  dexAMETHasone  Injectable 6 milliGRAM(s) IV Push daily  enoxaparin Injectable 50 milliGRAM(s) SubCutaneous every 12 hours  lacosamide 100 milliGRAM(s) Oral two times a day  oxybutynin XL 10 milliGRAM(s) Oral daily  pantoprazole    Tablet 40 milliGRAM(s) Oral before breakfast  remdesivir  IVPB   IV Intermittent   sertraline 50 milliGRAM(s) Oral daily  sodium chloride 0.9%. 1000 milliLiter(s) (60 mL/Hr) IV Continuous <Continuous>    MEDICATIONS  (PRN):  acetaminophen     Tablet .. 650 milliGRAM(s) Oral every 6 hours PRN Temp greater or equal to 38C (100.4F), Mild Pain (1 - 3)  aluminum hydroxide/magnesium hydroxide/simethicone Suspension 30 milliLiter(s) Oral every 4 hours PRN Dyspepsia  melatonin 3 milliGRAM(s) Oral at bedtime PRN Insomnia  ondansetron Injectable 4 milliGRAM(s) IV Push every 8 hours PRN Nausea and/or Vomiting            REVIEW OF SYSTEMS:  Constitutional: [ ] fever, [ ]weight loss,  [ ]fatigue [ ]weight gain  Eyes: [ ] visual changes  Respiratory: [ ]shortness of breath;  [ ] cough, [ ]wheezing, [ ]chills, [ ]hemoptysis  Cardiovascular: [ ] chest pain, [ ]palpitations, [ ]dizziness,  [ ]leg swelling[ ]orthopnea[ ]PND  Gastrointestinal: [ ] abdominal pain, [ ]nausea, [ ]vomiting,  [ ]diarrhea [ ]Constipation [ ]Melena  Genitourinary: [ ] dysuria, [ ] hematuria [ ]Myoa  Neurologic: [ ] headaches [ ] tremors[ ]weakness [ ]Paralysis Right[ ] Left[ ]  Skin: [ ] itching, [ ]burning, [ ] rashes  Endocrine: [ ] heat or cold intolerance  Musculoskeletal: [ ] joint pain or swelling; [ ] muscle, back, or extremity pain  Psychiatric: [ ] depression, [ ]anxiety, [ ]mood swings, or [ ]difficulty sleeping  Hematologic: [ ] easy bruising, [ ] bleeding gums    [ ] All remaining systems negative except as per above.   [ ]Unable to obtain.  [x] No change in ROS since admission      Vital Signs Last 24 Hrs  T(C): 37.6 (12 Jan 2023 19:50), Max: 37.6 (12 Jan 2023 19:50)  T(F): 99.6 (12 Jan 2023 19:50), Max: 99.6 (12 Jan 2023 19:50)  HR: 71 (12 Jan 2023 19:50) (71 - 110)  BP: 101/65 (12 Jan 2023 19:50) (84/55 - 107/70)  BP(mean): --  RR: 18 (12 Jan 2023 19:50) (18 - 18)  SpO2: 96% (12 Jan 2023 19:50) (96% - 100%)    Parameters below as of 12 Jan 2023 19:50  Patient On (Oxygen Delivery Method): room air      I&O's Summary      PHYSICAL EXAM:  General: No acute distress BMI-  HEENT: EOMI, PERRL  Neck: Supple, [ ] JVD  Lungs: Equal air entry bilaterally; [ ] rales [ ] wheezing [ ] rhonchi  Heart: Regular rate and rhythm; [x ] murmur   2/6 [ x] systolic [ ] diastolic [ ] radiation[ ] rubs [ ]  gallops  Abdomen: Nontender, bowel sounds present  Extremities: No clubbing, cyanosis, [ ] edema [ ]Pulses  equal and intact  Nervous system:  Alert & Oriented X3, no focal deficits  Psychiatric: Normal affect  Skin: No rashes or lesions    LABS:  01-12    139  |  109<H>  |  23<H>  ----------------------------<  99  3.9   |  25  |  0.81    Ca    8.3<L>      12 Jan 2023 12:40  Mg     2.1     01-12    TPro  5.9<L>  /  Alb  3.2<L>  /  TBili  0.7  /  DBili  x   /  AST  20  /  ALT  22  /  AlkPhos  94  01-12    Creatinine Trend: 0.81<--                        11.1   4.70  )-----------( 67       ( 12 Jan 2023 12:40 )             35.0         Serum Pro-Brain Natriuretic Peptide: 2401 pg/mL (01-12-23 @ 12:40)        
PULMONARY CONSULT NOTE      MONCHO GARVEY  MRN-[id number]    Patient is a 89y old  Female who presents with a chief complaint of Chest Pain (12 Jan 2023 15:40)  Hx chart lab and xrays reviewed   Pt examined in isolation room    HISTORY OF PRESENT ILLNESS:  History of Present Illness:   89F from home, AAOx3,  ambulates with walker here for a 3 day history of chest pain, SOB. Patient stated that she has a son-in-law with the flu. COVID tests outside were negative. Cough was productive initially now dry. Pain is worse with cough, diffuse around the chest.  As per pt's daughter, the pt underwent OP cardiology w/u studies (including EKG and TTE)  last September which were all reported as normal.     At the time of assessment pt reports feeling ok, Denies fevers, chills,  SOB, chest pain, N/V, swelling  in the legs.  Endorses less PO intake. Covid vaccinated X 2 (last dose received on 12/21 as per pt's daughter)    In the ED, EKG showed new onset aflutter, CXR showed   IMPRESSION:  1. No acute cardiopulmonary abnormalities are seen.  2. Chronic elevation of the right hemidiaphragm.  3. Atheroscleros    Home Medications:  amlodipine-benazepril 2.5 mg-10 mg oral capsule: 1 cap(s) orally once a day (12 Jan 2023 16:02)  esomeprazole 40 mg oral delayed release capsule: 1 cap(s) orally once a day (12 Jan 2023 16:03)  oxybutynin 10 mg/24 hr oral tablet, extended release: 1 tab(s) orally once a day (12 Jan 2023 16:02)  sertraline 50 mg oral tablet: 1 tab(s) orally once a day (12 Jan 2023 16:02)  Vimpat 100 mg oral tablet: 1 tab(s) orally 2 times a day (12 Jan 2023 16:02)      MEDICATIONS  (STANDING):  dexAMETHasone  Injectable 6 milliGRAM(s) IV Push daily  enoxaparin Injectable 50 milliGRAM(s) SubCutaneous every 12 hours  lacosamide 100 milliGRAM(s) Oral two times a day  metoprolol tartrate 12.5 milliGRAM(s) Oral every 12 hours  oxybutynin XL 10 milliGRAM(s) Oral daily  pantoprazole    Tablet 40 milliGRAM(s) Oral before breakfast  remdesivir  IVPB   IV Intermittent   remdesivir  IVPB 100 milliGRAM(s) IV Intermittent every 24 hours  sertraline 50 milliGRAM(s) Oral daily  sodium chloride 0.9%. 1000 milliLiter(s) (60 mL/Hr) IV Continuous <Continuous>      MEDICATIONS  (PRN):  acetaminophen     Tablet .. 650 milliGRAM(s) Oral every 6 hours PRN Temp greater or equal to 38C (100.4F), Mild Pain (1 - 3)  aluminum hydroxide/magnesium hydroxide/simethicone Suspension 30 milliLiter(s) Oral every 4 hours PRN Dyspepsia  melatonin 3 milliGRAM(s) Oral at bedtime PRN Insomnia  ondansetron Injectable 4 milliGRAM(s) IV Push every 8 hours PRN Nausea and/or Vomiting      Allergies    No Known Drug Allergies  Pineapple (Short breath)            PAST MEDICAL & SURGICAL HISTORY:  HTN (hypertension)      Epilepsy          FAMILY HISTORY:  HTN +   DM--   IHD--   Asthma--  COPD --    SOCIAL HISTORY SMOKING--      ETOH --    DRUGS--    REVIEW OF SYSTEMS:  CONSTITUTIONAL: No fever, weight loss, or fatigue   EYES: No eye pain, visual disturbances, or discharge  ENT:  No difficulty hearing, tinnitus, vertigo; No sinus or throat pain  NECK: No pain or stiffness   RESPIRATORY:  cough +  wheezing--   chills--   hemoptysis --   Shortness of Breath--  CARDIOVASCULAR: No chest pain, palpitations, passing out, dizziness, or leg swelling  GASTROINTESTINAL: No abdominal or epigastric pain. No nausea, vomiting, or hematemesis  GENITOURINARY: No dysuria, frequency, hematuria but  incontinence+  NEUROLOGICAL: No headaches, memory loss, loss of strength, numbness, or tremors  SKIN: No itching, burning, rashes, or lesions   LYMPH Nodes: No enlarged glands  ENDOCRINE: No heat or cold intolerance; No hair loss  MUSCULOSKELETAL: No joint pain or swelling; No muscle, back, or extremity pain  PSYCHIATRIC: No depression, anxiety, mood swings, or difficulty sleeping  HEME/LYMPH: No easy bruising, or bleeding gums  ALLERGY AND IMMUNOLOGIC: No hives or eczema      Vital Signs Last 24 Hrs  T(C): 36.4 (13 Jan 2023 08:15), Max: 37.7 (12 Jan 2023 21:24)  T(F): 97.6 (13 Jan 2023 08:15), Max: 99.9 (12 Jan 2023 21:24)  HR: 94 (13 Jan 2023 08:15) (71 - 110)  BP: 112/70 (13 Jan 2023 08:15) (84/55 - 146/83)  BP(mean): --  RR: 18 (13 Jan 2023 08:15) (18 - 20)  SpO2: 95% (13 Jan 2023 08:15) (95% - 100%)    Parameters below as of 13 Jan 2023 08:15  Patient On (Oxygen Delivery Method): room air      I&O's Detail    12 Jan 2023 07:01  -  13 Jan 2023 07:00  --------------------------------------------------------  IN:    Oral Fluid: 480 mL    sodium chloride 0.9%: 360 mL  Total IN: 840 mL    OUT:    Indwelling Catheter - Urethral (mL): 1650 mL  Total OUT: 1650 mL    Total NET: -810 mL          PHYSICAL EXAMINATION:    GENERAL: The patient is a thin w/f in no apparent distress.   SKIN: No rashes ecchymoses or cyanosis  HEENT: Head is normocephalic and atraumatic. Extraocular muscles are intact. Mucous membranes are moist.   Neck supple LN not felt, JVP not increased  Thyroid not enlarged  Lymphatic: No lymphadenopathy  Cardiovascular:  S1 S2  heard ,RSR , JVP not increased , systolic murmur at apex, No  gallop or rub  Respiratory:  Symmetrical chest wall movements Breathing vesicular , Percussion note normal no dulness   with   no  rales or  wheeze  ABDOMEN:  Soft, Non-tender, No  hepatosplenomegaly ,BS positive		  Extremities: Normal range of motion, No clubbing, cyanosis or edema , No calf tenderness  Vascular: Peripheral pulses palpable 2+ bilaterally  CNS: Alert and oriented x 3   Mood and affect appropriate  Cranial nerves intact  sensory intact  motor Power5/5, DTR 2+  Babinski neg        LABS:                        12.0   5.23  )-----------( 62       ( 13 Jan 2023 05:55 )             38.6     01-13    138  |  106  |  15  ----------------------------<  109<H>  3.8   |  23  |  0.71    Ca    8.5      13 Jan 2023 05:55  Mg     2.1     01-12    TPro  6.3  /  Alb  3.0<L>  /  TBili  0.5  /  DBili  x   /  AST  18  /  ALT  21  /  AlkPhos  100  01-13              Troponin I, High Sensitivity Result: 51.1 ng/L (01-12-23 @ 12:40)    D-Dimer Assay, Quantitative: 211 ng/mL DDU (01-13-23 @ 05:55)    Serum Pro-Brain Natriuretic Peptide: 2401 pg/mL (01-12-23 @ 12:40)    Lactate, Blood: 1.2 mmol/L (01-12-23 @ 12:40)            RADIOLOGY & ADDITIONAL STUDIES:    CXR: calcified granulomas rt lung, No acute infiltrate        ekg; A Flutter, non sp st-t changes

## 2023-01-13 NOTE — PROGRESS NOTE ADULT - PROBLEM SELECTOR PLAN 1
No cardiac history, dehydrated  Patient found to have new onset atrial flutter on EKG  Trop Negative  continue remote telemetry  currently on FD AC with lovenox, will switch to eliquis 2.5 mg bid  continue metoprolol tartrate 12.5 mg bid  f/u TTE  Cardiology Dr. Kimball following

## 2023-01-13 NOTE — PROGRESS NOTE ADULT - PROBLEM SELECTOR PLAN 2
2/2 covid   dimer 211  CXR - no pna  now tolerating room air  c/w albuterol mdi 2 puffs q6h  start prednisone taper 40 mg x3 days, 30 mg x3 days, 20 mg x3 days and 10 mg x3 days then stop  Puljosé miguel Rios following

## 2023-01-13 NOTE — PROGRESS NOTE ADULT - PROBLEM SELECTOR PLAN 2
2/2 covid   dimer 211  CXR - no pna  now tolerating room air  start albuterol nebulizer every 6 hours  start prednisone taper 40 mg x3 days, 30 mg x3 days, 20 mg x3 days and 10 mg x3 days then stop  Pulm Dr. Rios following 2/2 covid   dimer 211  CXR - no pna  now tolerating room air  c/w albuterol mdi 2 puffs q6h  start prednisone taper 40 mg x3 days, 30 mg x3 days, 20 mg x3 days and 10 mg x3 days then stop  Puljosé miguel Rios following

## 2023-01-13 NOTE — CONSULT NOTE ADULT - TIME BILLING
I have examined pt personally Hx chart lab and xrays reviewed and pt discussed with staff and PMD
- Review of records, telemetry, vital signs and daily labs.   - General and cardiovascular physical examination.  - Generation of cardiovascular treatment plan.  - Coordination of care.      Patient was seen and examined by me on 1/12/23,interim events noted,labs and radiology studies reviewed.  Silvino Kimball MD,FACC.  [address] [place], [place] [id number].  [phone]

## 2023-01-13 NOTE — PROGRESS NOTE ADULT - PROBLEM SELECTOR PLAN 1
No cardiac history, dehydrated  Patient found to have new onset atrial flutter on EKG  Trop Negative  continue remote telemetry  s/p FD AC lovenox, switched to eliquis 2.5 mg bid  continue metoprolol tartrate 12.5 mg bid  f/u TTE

## 2023-01-13 NOTE — PROGRESS NOTE ADULT - PROBLEM SELECTOR PLAN 1
No cardiac history, dehydrated  Patient found to have new onset atrial flutter on EKG  Trop Negative  continue remote telemetry  currently on FD AC with lovenox, will switch to eliquis 2.5 mg bid  continue metoprolol tartrate 12.5 mg bid  f/u TTE  Cardiology Dr. Kimball following No cardiac history, dehydrated  Patient found to have new onset atrial flutter on EKG  Trop Negative  continue remote telemetry  s/p FD AC lovenox, switched to eliquis 2.5 mg bid  continue metoprolol tartrate 12.5 mg bid  f/u TTE  Cardiology Dr. Kimball following

## 2023-01-13 NOTE — PROGRESS NOTE ADULT - PROBLEM SELECTOR PLAN 2
2/2 covid   dimer 211  CXR - no pna  now tolerating room air  start albuterol nebulizer every 6 hours  start prednisone taper 40 mg x3 days, 30 mg x3 days, 20 mg x3 days and 10 mg x3 days then stop  Pulm Dr. Rios following

## 2023-01-13 NOTE — CONSULT NOTE ADULT - ASSESSMENT
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
89F from home, ambulates with walker here for a 3 day history of chest pain, SOB, concerning for acute hypoxic respiratory failure, found to be COVID positive, and have new onset aflutter.     Problem/Plan - 1:  ·  Problem: New onset atrial flutter.   ·  Plan: No cardiac history, dehydrated  Patient found to have new onset atrial flutter on EKG  Trop Negative  remote telemetry  started on FD AC with lovenox. Pt's daughter Beronica aware of risk and benefits of AC. In agreement to start FD anticoagulation  holding BB for now, given soft BP   f/u TTE     Problem/Plan - 2:  ·  Problem: Acute respiratory failure with hypoxia.   ·  Plan: 2/2 covid   supplemental oxygen via [place]  goal o2sat >95%  wean off as tolerated  obtain ambulatory O2Sat when pt able to tolerate.     Problem/Plan - 3:  ·  Problem: 2019 novel coronavirus disease (COVID-19).   ·  Plan: Patient had son-in-law exposure  Tested positive  O2Sat 88-92  supplemental oxygen via [place]  started on remdesevir and decadron   Supportive measures  f/u covid markers.     Problem/Plan - 4:  ·  Problem: HTN (hypertension).   ·  Plan: Continue home medication Amlodipine 2.5 and Benazapril 10mg  BP soft  holding off on anti-hypertensive medications given soft BP  resumed once clinically indicated.     Problem/Plan - 5:  ·  Problem: Anxiety.   ·  Plan: Continue home medication  Sertraline 50mg

## 2023-01-13 NOTE — PATIENT PROFILE ADULT - FUNCTIONAL ASSESSMENT - BASIC MOBILITY ASSESSMENT TYPE
January 21, 2020      Zhou Hay Jr., MD  [address]  [place] [address]           [facility]  [address]  [place] [address]  Phone: [phone]  Fax: [phone]          Patient: Burak Lazar   MR Number: [id number]   YOB: 1948   Date of Visit: 1/16/2020       Dear Dr. Zhou Hay Jr.:    Thank you for referring Burak Lazar to me for evaluation. Attached you will find relevant portions of my assessment and plan of care.    If you have questions, please do not hesitate to call me. I look forward to following Burak Lazar along with you.    Sincerely,    Mike Bradshaw III, MD    Enclosure  CC:  No Recipients    If you would like to receive this communication electronically, please contact [email or web address] or [phone] to request more information on [employer] Link access.    For providers and/or their staff who would like to refer a patient to [facility], please contact us through our one-stop-shop provider referral line, [facility] [place], at [phone].    If you feel you have received this communication in error or would no longer like to receive these types of communications, please e-mail [email or web address]          Admission

## 2023-01-14 RX ADMIN — Medication 12.5 MILLIGRAM(S): at 17:15

## 2023-01-14 RX ADMIN — Medication 12.5 MILLIGRAM(S): at 05:03

## 2023-01-14 RX ADMIN — Medication 40 MILLIGRAM(S): at 05:01

## 2023-01-14 RX ADMIN — LACOSAMIDE 100 MILLIGRAM(S): 50 TABLET ORAL at 17:24

## 2023-01-14 RX ADMIN — APIXABAN 2.5 MILLIGRAM(S): 2.5 TABLET, FILM COATED ORAL at 05:03

## 2023-01-14 RX ADMIN — SERTRALINE 50 MILLIGRAM(S): 25 TABLET, FILM COATED ORAL at 11:35

## 2023-01-14 RX ADMIN — PANTOPRAZOLE SODIUM 40 MILLIGRAM(S): 20 TABLET, DELAYED RELEASE ORAL at 05:02

## 2023-01-14 RX ADMIN — APIXABAN 2.5 MILLIGRAM(S): 2.5 TABLET, FILM COATED ORAL at 17:13

## 2023-01-14 RX ADMIN — LACOSAMIDE 100 MILLIGRAM(S): 50 TABLET ORAL at 05:04

## 2023-01-14 RX ADMIN — Medication 10 MILLIGRAM(S): at 11:35

## 2023-01-14 NOTE — PROGRESS NOTE ADULT - ASSESSMENT
89 y/o F from home, hx of covid vaccine x2, ambulates with walker here for a 3 day history of chest pain, SOB, concerning for acute hypoxic respiratory failure, found to be COVID positive and was admitted to telemetry due to new onset aflutter. Cardiology Dr. Kimball following. CXR no PNA. currently on full dose lovenox, switch to eliquis 2.5 mg bid.   s/p decadron and remdesivir (1/12-1/13). Started on prednisone taper. Pulm Dr. Rios following.   Now transitioned to room air. Pending echocardiogram.     During hospitalization, pt had urinary retention and wells was placed on 1/12.

## 2023-01-14 NOTE — PROGRESS NOTE ADULT - PROBLEM SELECTOR PLAN 1
No cardiac history, dehydrated  Patient found to have new onset atrial flutter on EKG  Trop Negative  continue remote telemetry  s/p FD AC lovenox, switched to eliquis 2.5 mg bid  continue metoprolol tartrate 12.5 mg bid  f/u TTE  Cardiology Dr. Kimball following

## 2023-01-14 NOTE — PROGRESS NOTE ADULT - ASSESSMENT
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

## 2023-01-14 NOTE — PROGRESS NOTE ADULT - PROBLEM SELECTOR PLAN 2
2/2 covid   dimer 211  CXR - no pna  now tolerating room air  c/w albuterol mdi 2 puffs q6h  start prednisone taper 40 mg x3 days, 30 mg x3 days, 20 mg x3 days and 10 mg x3 days then stop  Pulm f/u

## 2023-01-15 RX ORDER — ALBUTEROL 90 UG/1
2 AEROSOL, METERED ORAL EVERY 6 HOURS
Refills: 0 | Status: DISCONTINUED | OUTPATIENT
Start: 2023-01-15 | End: 2023-01-17

## 2023-01-15 RX ADMIN — APIXABAN 2.5 MILLIGRAM(S): 2.5 TABLET, FILM COATED ORAL at 17:22

## 2023-01-15 RX ADMIN — PANTOPRAZOLE SODIUM 40 MILLIGRAM(S): 20 TABLET, DELAYED RELEASE ORAL at 05:21

## 2023-01-15 RX ADMIN — LACOSAMIDE 100 MILLIGRAM(S): 50 TABLET ORAL at 05:21

## 2023-01-15 RX ADMIN — LACOSAMIDE 100 MILLIGRAM(S): 50 TABLET ORAL at 17:22

## 2023-01-15 RX ADMIN — APIXABAN 2.5 MILLIGRAM(S): 2.5 TABLET, FILM COATED ORAL at 05:20

## 2023-01-15 RX ADMIN — Medication 40 MILLIGRAM(S): at 05:19

## 2023-01-15 RX ADMIN — SERTRALINE 50 MILLIGRAM(S): 25 TABLET, FILM COATED ORAL at 11:57

## 2023-01-15 RX ADMIN — Medication 12.5 MILLIGRAM(S): at 05:22

## 2023-01-15 RX ADMIN — Medication 12.5 MILLIGRAM(S): at 17:22

## 2023-01-15 RX ADMIN — ALBUTEROL 2 PUFF(S): 90 AEROSOL, METERED ORAL at 17:23

## 2023-01-15 RX ADMIN — ALBUTEROL 2 PUFF(S): 90 AEROSOL, METERED ORAL at 22:00

## 2023-01-15 RX ADMIN — Medication 10 MILLIGRAM(S): at 11:56

## 2023-01-15 NOTE — PHYSICAL THERAPY INITIAL EVALUATION ADULT - ACTIVE RANGE OF MOTION EXAMINATION, REHAB EVAL
grossly assessed WFL AAROM/bilateral upper extremity Active ROM was WFL (within functional limits)/bilateral  lower extremity Active ROM was WFL (within functional limits)

## 2023-01-15 NOTE — PHYSICAL THERAPY INITIAL EVALUATION ADULT - PLANNED THERAPY INTERVENTIONS, PT EVAL
balance training/bed mobility training/gait training/strengthening/transfer training Complex Repair And Modified Advancement Flap Text: The defect edges were debeveled with a #15 scalpel blade.  The primary defect was closed partially with a complex linear closure.  Given the location of the remaining defect, shape of the defect and the proximity to free margins a modified advancement flap was deemed most appropriate for complete closure of the defect.  Using a sterile surgical marker, an appropriate advancement flap was drawn incorporating the defect and placing the expected incisions within the relaxed skin tension lines where possible.    The area thus outlined was incised deep to adipose tissue with a #15 scalpel blade.  The skin margins were undermined to an appropriate distance in all directions utilizing iris scissors.

## 2023-01-15 NOTE — PROGRESS NOTE ADULT - ASSESSMENT
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 Throat has no vesicles, no oropharyngeal exudates and uvula is midline. Clear tympanic membranes bilaterally.

## 2023-01-15 NOTE — PHYSICAL THERAPY INITIAL EVALUATION ADULT - GENERAL OBSERVATIONS, REHAB EVAL
Consult received,EMR, radiology and labs reviewed. Patient received supine in bed, NAD, on isolation precaution due to COVID+, [place] speaking, very  [place] - unable to use Int phone, follows simple commands.  Patient agreed to EVALUATION from Physical Therapist.

## 2023-01-16 LAB
ALBUMIN SERPL ELPH-MCNC: 2.7 G/DL — LOW (ref 3.5–5)
ALP SERPL-CCNC: 99 U/L — SIGNIFICANT CHANGE UP (ref 40–120)
ALT FLD-CCNC: 22 U/L DA — SIGNIFICANT CHANGE UP (ref 10–60)
ANION GAP SERPL CALC-SCNC: 8 MMOL/L — SIGNIFICANT CHANGE UP (ref 5–17)
AST SERPL-CCNC: 18 U/L — SIGNIFICANT CHANGE UP (ref 10–40)
BILIRUB SERPL-MCNC: 0.7 MG/DL — SIGNIFICANT CHANGE UP (ref 0.2–1.2)
BUN SERPL-MCNC: 20 MG/DL — HIGH (ref 7–18)
CALCIUM SERPL-MCNC: 9.1 MG/DL — SIGNIFICANT CHANGE UP (ref 8.4–10.5)
CHLORIDE SERPL-SCNC: 105 MMOL/L — SIGNIFICANT CHANGE UP (ref 96–108)
CO2 SERPL-SCNC: 26 MMOL/L — SIGNIFICANT CHANGE UP (ref 22–31)
CREAT SERPL-MCNC: 0.74 MG/DL — SIGNIFICANT CHANGE UP (ref 0.5–1.3)
EGFR: 75 ML/MIN/1.73M2 — SIGNIFICANT CHANGE UP
GLUCOSE SERPL-MCNC: 94 MG/DL — SIGNIFICANT CHANGE UP (ref 70–99)
HCT VFR BLD CALC: 40.8 % — SIGNIFICANT CHANGE UP (ref 34.5–45)
HGB BLD-MCNC: 13.1 G/DL — SIGNIFICANT CHANGE UP (ref 11.5–15.5)
MCHC RBC-ENTMCNC: 26.7 PG — LOW (ref 27–34)
MCHC RBC-ENTMCNC: 32.1 GM/DL — SIGNIFICANT CHANGE UP (ref 32–36)
MCV RBC AUTO: 83.1 FL — SIGNIFICANT CHANGE UP (ref 80–100)
NRBC # BLD: 0 /100 WBCS — SIGNIFICANT CHANGE UP (ref 0–0)
PLATELET # BLD AUTO: 123 K/UL — LOW (ref 150–400)
POTASSIUM SERPL-MCNC: 4.5 MMOL/L — SIGNIFICANT CHANGE UP (ref 3.5–5.3)
POTASSIUM SERPL-SCNC: 4.5 MMOL/L — SIGNIFICANT CHANGE UP (ref 3.5–5.3)
PROT SERPL-MCNC: 6 G/DL — SIGNIFICANT CHANGE UP (ref 6–8.3)
RBC # BLD: 4.91 M/UL — SIGNIFICANT CHANGE UP (ref 3.8–5.2)
RBC # FLD: 13.4 % — SIGNIFICANT CHANGE UP (ref 10.3–14.5)
SODIUM SERPL-SCNC: 139 MMOL/L — SIGNIFICANT CHANGE UP (ref 135–145)
WBC # BLD: 7.76 K/UL — SIGNIFICANT CHANGE UP (ref 3.8–10.5)
WBC # FLD AUTO: 7.76 K/UL — SIGNIFICANT CHANGE UP (ref 3.8–10.5)

## 2023-01-16 RX ADMIN — APIXABAN 2.5 MILLIGRAM(S): 2.5 TABLET, FILM COATED ORAL at 18:13

## 2023-01-16 RX ADMIN — ALBUTEROL 2 PUFF(S): 90 AEROSOL, METERED ORAL at 10:08

## 2023-01-16 RX ADMIN — Medication 40 MILLIGRAM(S): at 06:25

## 2023-01-16 RX ADMIN — Medication 10 MILLIGRAM(S): at 12:21

## 2023-01-16 RX ADMIN — SERTRALINE 50 MILLIGRAM(S): 25 TABLET, FILM COATED ORAL at 12:21

## 2023-01-16 RX ADMIN — LACOSAMIDE 100 MILLIGRAM(S): 50 TABLET ORAL at 18:13

## 2023-01-16 RX ADMIN — ALBUTEROL 2 PUFF(S): 90 AEROSOL, METERED ORAL at 21:23

## 2023-01-16 RX ADMIN — PANTOPRAZOLE SODIUM 40 MILLIGRAM(S): 20 TABLET, DELAYED RELEASE ORAL at 06:26

## 2023-01-16 RX ADMIN — LACOSAMIDE 100 MILLIGRAM(S): 50 TABLET ORAL at 06:26

## 2023-01-16 RX ADMIN — APIXABAN 2.5 MILLIGRAM(S): 2.5 TABLET, FILM COATED ORAL at 06:25

## 2023-01-16 RX ADMIN — Medication 12.5 MILLIGRAM(S): at 18:13

## 2023-01-16 RX ADMIN — Medication 12.5 MILLIGRAM(S): at 06:26

## 2023-01-16 NOTE — PROGRESS NOTE ADULT - PROBLEM SELECTOR PLAN 4
Continue home medication Amlodipine 2.5 and Benazapril 10mg  holding off on anti-hypertensive medications given soft BP  resumed once clinically indicated  controlled off meds
pt found to have urinary rentention, wells inserted on 1/12  pending tov on 1/14
Continue home medication Amlodipine 2.5 and Benazapril 10mg  holding off on anti-hypertensive medications given soft BP  resumed once clinically indicated  controlled off meds
Continue home medication Amlodipine 2.5 and Benazapril 10mg  holding off on anti-hypertensive medications given soft BP  resumed once clinically indicated  controlled off meds
pt found to have urinary rentention, wells inserted on 1/12  pending tov on 1/14

## 2023-01-16 NOTE — PROGRESS NOTE ADULT - PROBLEM SELECTOR PROBLEM 1
New onset atrial flutter

## 2023-01-16 NOTE — PROGRESS NOTE ADULT - PROBLEM SELECTOR PROBLEM 4
Urinary retention
HTN (hypertension)
HTN (hypertension)
Urinary retention
HTN (hypertension)
Urinary retention

## 2023-01-16 NOTE — PROGRESS NOTE ADULT - PROBLEM SELECTOR PLAN 7
Continue home medication - Sertraline 50mg BID

## 2023-01-16 NOTE — PROGRESS NOTE ADULT - PROBLEM SELECTOR PLAN 8
DVT: Lovenox FD  GI: Protonix while on decadron

## 2023-01-16 NOTE — PROGRESS NOTE ADULT - PROBLEM SELECTOR PLAN 6
Continue home medication Amlodipine 2.5 and Benazapril 10mg  holding off on anti-hypertensive medications given soft BP  resumed once clinically indicated  controlled off meds

## 2023-01-16 NOTE — PROGRESS NOTE ADULT - PROBLEM SELECTOR PLAN 3
found positive on admission 1/12  pt is vaccinated x2  continue airborne isolation  s/p remdesevir and decadron 1/12-1/13  continue Supportive measures- tylenol 650 mg q6h PRN for fever  tolerating room air

## 2023-01-16 NOTE — PROGRESS NOTE ADULT - PROBLEM SELECTOR PROBLEM 5
Prophylactic measure
Prophylactic measure
Seizures
Prophylactic measure
Seizures

## 2023-01-16 NOTE — PROGRESS NOTE ADULT - PROBLEM SELECTOR PLAN 5
hx of seizures  continue vimpat 100 mg bid
DVT: Lovenox FD  GI: Protonix while on decadron
DVT: Lovenox FD  GI: Protonix while on decadron
hx of seizures  continue vimpat 100 mg bid
DVT: Lovenox FD  GI: Protonix while on decadron
hx of seizures  continue vimpat 100 mg bid

## 2023-01-16 NOTE — PROGRESS NOTE ADULT - ASSESSMENT
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

## 2023-01-17 VITALS
TEMPERATURE: 98 F | DIASTOLIC BLOOD PRESSURE: 67 MMHG | HEART RATE: 85 BPM | OXYGEN SATURATION: 98 % | RESPIRATION RATE: 18 BRPM | SYSTOLIC BLOOD PRESSURE: 116 MMHG

## 2023-01-17 LAB
CULTURE RESULTS: SIGNIFICANT CHANGE UP
CULTURE RESULTS: SIGNIFICANT CHANGE UP
SPECIMEN SOURCE: SIGNIFICANT CHANGE UP
SPECIMEN SOURCE: SIGNIFICANT CHANGE UP

## 2023-01-17 PROCEDURE — 82728 ASSAY OF FERRITIN: CPT

## 2023-01-17 PROCEDURE — 36415 COLL VENOUS BLD VENIPUNCTURE: CPT

## 2023-01-17 PROCEDURE — 80053 COMPREHEN METABOLIC PANEL: CPT

## 2023-01-17 PROCEDURE — 87040 BLOOD CULTURE FOR BACTERIA: CPT

## 2023-01-17 PROCEDURE — 83615 LACTATE (LD) (LDH) ENZYME: CPT

## 2023-01-17 PROCEDURE — 84484 ASSAY OF TROPONIN QUANT: CPT

## 2023-01-17 PROCEDURE — 83735 ASSAY OF MAGNESIUM: CPT

## 2023-01-17 PROCEDURE — 93005 ELECTROCARDIOGRAM TRACING: CPT

## 2023-01-17 PROCEDURE — 84480 ASSAY TRIIODOTHYRONINE (T3): CPT

## 2023-01-17 PROCEDURE — 85025 COMPLETE CBC W/AUTO DIFF WBC: CPT

## 2023-01-17 PROCEDURE — 80061 LIPID PANEL: CPT

## 2023-01-17 PROCEDURE — 83036 HEMOGLOBIN GLYCOSYLATED A1C: CPT

## 2023-01-17 PROCEDURE — 97162 PT EVAL MOD COMPLEX 30 MIN: CPT

## 2023-01-17 PROCEDURE — 99285 EMERGENCY DEPT VISIT HI MDM: CPT | Mod: 25

## 2023-01-17 PROCEDURE — 71045 X-RAY EXAM CHEST 1 VIEW: CPT

## 2023-01-17 PROCEDURE — 85379 FIBRIN DEGRADATION QUANT: CPT

## 2023-01-17 PROCEDURE — 94640 AIRWAY INHALATION TREATMENT: CPT

## 2023-01-17 PROCEDURE — 84443 ASSAY THYROID STIM HORMONE: CPT

## 2023-01-17 PROCEDURE — 83880 ASSAY OF NATRIURETIC PEPTIDE: CPT

## 2023-01-17 PROCEDURE — 87637 SARSCOV2&INF A&B&RSV AMP PRB: CPT

## 2023-01-17 PROCEDURE — 83605 ASSAY OF LACTIC ACID: CPT

## 2023-01-17 PROCEDURE — 84436 ASSAY OF TOTAL THYROXINE: CPT

## 2023-01-17 PROCEDURE — 85027 COMPLETE CBC AUTOMATED: CPT

## 2023-01-17 PROCEDURE — 86140 C-REACTIVE PROTEIN: CPT

## 2023-01-17 RX ORDER — APIXABAN 2.5 MG/1
1 TABLET, FILM COATED ORAL
Qty: 180 | Refills: 0
Start: 2023-01-17 | End: 2023-04-16

## 2023-01-17 RX ORDER — AMLODIPINE BESYLATE AND BENAZEPRIL HYDROCHLORIDE 10; 20 MG/1; MG/1
1 CAPSULE ORAL
Qty: 0 | Refills: 0 | DISCHARGE

## 2023-01-17 RX ORDER — OXYBUTYNIN CHLORIDE 5 MG
1 TABLET ORAL
Qty: 0 | Refills: 0 | DISCHARGE

## 2023-01-17 RX ORDER — METOPROLOL TARTRATE 50 MG
1 TABLET ORAL
Qty: 90 | Refills: 0
Start: 2023-01-17 | End: 2023-04-16

## 2023-01-17 RX ORDER — ESOMEPRAZOLE MAGNESIUM 40 MG/1
1 CAPSULE, DELAYED RELEASE ORAL
Qty: 0 | Refills: 0 | DISCHARGE

## 2023-01-17 RX ADMIN — PANTOPRAZOLE SODIUM 40 MILLIGRAM(S): 20 TABLET, DELAYED RELEASE ORAL at 05:06

## 2023-01-17 RX ADMIN — ALBUTEROL 2 PUFF(S): 90 AEROSOL, METERED ORAL at 09:22

## 2023-01-17 RX ADMIN — APIXABAN 2.5 MILLIGRAM(S): 2.5 TABLET, FILM COATED ORAL at 05:05

## 2023-01-17 RX ADMIN — Medication 10 MILLIGRAM(S): at 12:11

## 2023-01-17 RX ADMIN — Medication 12.5 MILLIGRAM(S): at 05:05

## 2023-01-17 RX ADMIN — LACOSAMIDE 100 MILLIGRAM(S): 50 TABLET ORAL at 05:05

## 2023-01-17 RX ADMIN — SERTRALINE 50 MILLIGRAM(S): 25 TABLET, FILM COATED ORAL at 12:11

## 2023-01-17 NOTE — DISCHARGE NOTE NURSING/CASE MANAGEMENT/SOCIAL WORK - NSDCPEELIQUISFU_GEN_ALL_CORE
Patient requesting paper prescription for below pended medications.    Go for blood tests as directed. Your doctor will do lab tests at regular visits to monitor the effects of this medicine. Please follow up with your doctor and keep your health care provider appointments.

## 2023-01-17 NOTE — DISCHARGE NOTE PROVIDER - PROVIDER TOKENS
FREE:[LAST:[manduca],FIRST:[jatinder],PHONE:[phone]],FAX:[phone]    [phone]],ADDRESS:[-[address] [place] [address]],FOLLOWUP:[1 week]],PROVIDER:[TOKEN:[[id number]],FOLLOWUP:[Routine]]

## 2023-01-17 NOTE — PROGRESS NOTE ADULT - SUBJECTIVE AND OBJECTIVE BOX
PATIENT SEEN AND EXAMINED ON :- 1/16/23  DATE OF SERVICE:      1/16/23       Interim events noted,Labs ,Radiological studies and Cardiology tests reviewed .         HOSPITAL COURSE: HPI:  89F from home, AAOx3,  ambulates with walker here for a 3 day history of chest pain, SOB. Patient stated that she has a son-in-law with the flu. COVID tests outside were negative. Cough was productive initially now dry. Pain is worse with cough, diffuse around the chest.  As per pt's daughter, the pt underwent OP cardiology w/u studies (including EKG and TTE)  last September which were all reported as normal.     At the time of assessment pt reports feeling ok, Denies fevers, chills,  SOB, chest pain, N/V, swelling  in the legs.  Endorses less PO intake. Covid vaccinated X 2 (last dose received on 12/21 as per pt's daughter)    In the ED, EKG showed new onset aflutter, CXR showed   IMPRESSION:  1. No acute cardiopulmonary abnormalities are seen.  2. Chronic elevation of the right hemidiaphragm.  3. Atherosclerosis.  4. Degenerative changes of the spine and shoulders along with upward   subluxation of the right humeral head. 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    Trop Negative  ProBNP slightly elevated  COVID Positive (12 Jan 2023 15:40)      INTERIM EVENTS:Patient seen at bedside ,interim events noted.      PMH -reviewed admission note, no change since admission  HEART FAILURE: Acute[ ]Chronic[ ] Systolic[ ] Diastolic[ ] Combined Systolic and Diastolic[ ]  CAD[ ] CABG[ ] PCI[ ]  DEVICES[ ] PPM[ ] ICD[ ] ILR[ ]  ATRIAL FIBRILLATION[ ] Paroxysmal[ ] Permanent[ ] CHADS2-[  ]  CECILE[ ] CKD1[ ] CKD2[ ] CKD3[ ] CKD4[ ] ESRD[ ]  COPD[ ] HTN[ ]   DM[ ] Type1[ ] Type 2[ ]   CVA[ ] Paresis[ ]    AMBULATION: Assisted[ ] Cane/walker[ ] Independent[ ]    MEDICATIONS  (STANDING):  albuterol    90 MICROgram(s) HFA Inhaler 2 Puff(s) Inhalation every 6 hours  apixaban 2.5 milliGRAM(s) Oral every 12 hours  lacosamide 100 milliGRAM(s) Oral two times a day  metoprolol tartrate 12.5 milliGRAM(s) Oral every 12 hours  oxybutynin XL 10 milliGRAM(s) Oral daily  pantoprazole    Tablet 40 milliGRAM(s) Oral before breakfast  sertraline 50 milliGRAM(s) Oral daily  sodium chloride 0.9%. 1000 milliLiter(s) (60 mL/Hr) IV Continuous <Continuous>    MEDICATIONS  (PRN):  acetaminophen     Tablet .. 650 milliGRAM(s) Oral every 6 hours PRN Temp greater or equal to 38C (100.4F), Mild Pain (1 - 3)  aluminum hydroxide/magnesium hydroxide/simethicone Suspension 30 milliLiter(s) Oral every 4 hours PRN Dyspepsia  melatonin 3 milliGRAM(s) Oral at bedtime PRN Insomnia  ondansetron Injectable 4 milliGRAM(s) IV Push every 8 hours PRN Nausea and/or Vomiting            REVIEW OF SYSTEMS:  Constitutional: [ ] fever, [ ]weight loss,  [ ]fatigue [ ]weight gain  Eyes: [ ] visual changes  Respiratory: [ ]shortness of breath;  [ ] cough, [ ]wheezing, [ ]chills, [ ]hemoptysis  Cardiovascular: [ ] chest pain, [ ]palpitations, [ ]dizziness,  [ ]leg swelling[ ]orthopnea[ ]PND  Gastrointestinal: [ ] abdominal pain, [ ]nausea, [ ]vomiting,  [ ]diarrhea [ ]Constipation [ ]Melena  Genitourinary: [ ] dysuria, [ ] hematuria [ ]Moya  Neurologic: [ ] headaches [ ] tremors[ ]weakness [ ]Paralysis Right[ ] Left[ ]  Skin: [ ] itching, [ ]burning, [ ] rashes  Endocrine: [ ] heat or cold intolerance  Musculoskeletal: [ ] joint pain or swelling; [ ] muscle, back, or extremity pain  Psychiatric: [ ] depression, [ ]anxiety, [ ]mood swings, or [ ]difficulty sleeping  Hematologic: [ ] easy bruising, [ ] bleeding gums    [ ] All remaining systems negative except as per above.   [ ]Unable to obtain.  [x] No change in ROS since admission      Vital Signs Last 24 Hrs  T(C): 36.7 (16 Jan 2023 20:09), Max: 36.9 (16 Jan 2023 15:54)  T(F): 98 (16 Jan 2023 20:09), Max: 98.4 (16 Jan 2023 15:54)  HR: 91 (16 Jan 2023 20:09) (86 - 93)  BP: 136/73 (16 Jan 2023 20:09) (126/83 - 152/85)  BP(mean): --  RR: 18 (16 Jan 2023 20:09) (16 - 18)  SpO2: 98% (16 Jan 2023 20:09) (95% - 98%)    Parameters below as of 16 Jan 2023 20:09  Patient On (Oxygen Delivery Method): room air      I&O's Summary    15 Shahab 2023 07:01  -  16 Jan 2023 07:00  --------------------------------------------------------  IN: 200 mL / OUT: 950 mL / NET: -750 mL        PHYSICAL EXAM:  General: No acute distress BMI-  HEENT: EOMI, PERRL  Neck: Supple, [ ] JVD  Lungs: Equal air entry bilaterally; [ ] rales [ ] wheezing [ ] rhonchi  Heart: Regular rate and rhythm; [x ] murmur   2/6 [ x] systolic [ ] diastolic [ ] radiation[ ] rubs [ ]  gallops  Abdomen: Nontender, bowel sounds present  Extremities: No clubbing, cyanosis, [ ] edema [ ]Pulses  equal and intact  Nervous system:  Alert & Oriented X3, no focal deficits  Psychiatric: Normal affect  Skin: No rashes or lesions    LABS:  01-16    139  |  105  |  20<H>  ----------------------------<  94  4.5   |  26  |  0.74    Ca    9.1      16 Jan 2023 06:25    TPro  6.0  /  Alb  2.7<L>  /  TBili  0.7  /  DBili  x   /  AST  18  /  ALT  22  /  AlkPhos  99  01-16    Creatinine Trend: 0.74<--, 0.71<--, 0.81<--                        13.1   7.76  )-----------( 123      ( 16 Jan 2023 06:25 )             40.8               
PULMONARY  progress note    MONCHO GARVEY  [id number]    Patient is a 89y old  Female who presents with a chief complaint of Chest Pain (16 Jan 2023 11:38)  no chest pain or sob      MEDICATIONS  (STANDING):  albuterol    90 MICROgram(s) HFA Inhaler 2 Puff(s) Inhalation every 6 hours  apixaban 2.5 milliGRAM(s) Oral every 12 hours  lacosamide 100 milliGRAM(s) Oral two times a day  metoprolol tartrate 12.5 milliGRAM(s) Oral every 12 hours  oxybutynin XL 10 milliGRAM(s) Oral daily  pantoprazole    Tablet 40 milliGRAM(s) Oral before breakfast  sertraline 50 milliGRAM(s) Oral daily  sodium chloride 0.9%. 1000 milliLiter(s) (60 mL/Hr) IV Continuous <Continuous>      MEDICATIONS  (PRN):  acetaminophen     Tablet .. 650 milliGRAM(s) Oral every 6 hours PRN Temp greater or equal to 38C (100.4F), Mild Pain (1 - 3)  aluminum hydroxide/magnesium hydroxide/simethicone Suspension 30 milliLiter(s) Oral every 4 hours PRN Dyspepsia  melatonin 3 milliGRAM(s) Oral at bedtime PRN Insomnia  ondansetron Injectable 4 milliGRAM(s) IV Push every 8 hours PRN Nausea and/or Vomiting      Allergies    No Known Drug Allergies  Pineapple (Short breath)            PAST MEDICAL & SURGICAL HISTORY:  HTN (hypertension)      Epilepsy                 REVIEW OF SYSTEMS:  CONSTITUTIONAL: No fever, weight loss, or fatigue   EYES: No eye pain, visual disturbances, or discharge  ENT:  No difficulty hearing, tinnitus, vertigo; No sinus or throat pain  NECK: No pain or stiffness or nodes  RESPIRATORY:  cough --  wheezing--   chills --  hemoptysis--  Shortness of Breath--  CARDIOVASCULAR: No chest pain, palpitations, passing out, dizziness, or leg swelling  GASTROINTESTINAL: No abdominal or epigastric pain. No nausea, vomiting, or hematemesis;   GENITOURINARY: No dysuria, frequency, hematuria, or incontinence  LYMPH Nodes: No enlarged glands  ENDOCRINE: No heat or cold intolerance; No hair loss  MUSCULOSKELETAL: No joint pain or swelling; No muscle, back, or extremity pain  PSYCHIATRIC: No depression, anxiety, mood swings, or difficulty sleeping  HEME/LYMPH: No easy bruising, or bleeding gums  ALLERGY AND IMMUNOLOGIC: No hives or eczema        Vital Signs Last 24 Hrs  T(C): 36.8 (17 Jan 2023 07:15), Max: 36.9 (16 Jan 2023 15:54)  T(F): 98.3 (17 Jan 2023 07:15), Max: 98.4 (16 Jan 2023 15:54)  HR: 83 (17 Jan 2023 07:15) (78 - 91)  BP: 120/65 (17 Jan 2023 07:15) (113/63 - 141/62)  BP(mean): --  RR: 18 (17 Jan 2023 07:15) (17 - 18)  SpO2: 98% (17 Jan 2023 07:15) (97% - 98%)    Parameters below as of 17 Jan 2023 07:15  Patient On (Oxygen Delivery Method): room air      I&O's Detail      PHYSICAL EXAMINATION:    GENERAL: The patient is a thin h/f  in no apparent distress.   SKIN no rash ecchymoses or bruises  HEENT: Head is normocephalic and atraumatic  BARBARA , Extraocular muscles are intact. Mucous membranes  moist.   Neck supple ,No LN felt JVP not increased  Thyroid not enlarged  Cardiovascular:  S1 S2 heard, AF , No JVD , systolic  murmur at apex, No gallop or rub  Respiratory: Chest wall symmetrical with good air entry ,Percussion note normal,    Lungs vesicular breathing with no   rales  or  wheeze	  ABDOMEN:  Soft, Non-tender,  no hepatomegaly or splenomegaly BS positive	  Extremities: Normal range of motion, No clubbing, cyanosis or edema  Vascular: Peripheral pulses palpable 2+ bilaterally  CNS:  Alert and responsive  sensory intact  motor power5/5  dtr 2+  Babinski neg        LABS:                        13.1   7.76  )-----------( 123      ( 16 Jan 2023 06:25 )             40.8     01-16    139  |  105  |  20<H>  ----------------------------<  94  4.5   |  26  |  0.74    Ca    9.1      16 Jan 2023 06:25    TPro  6.0  /  Alb  2.7<L>  /  TBili  0.7  /  DBili  x   /  AST  18  /  ALT  22  /  AlkPhos  99  01-16    Ferritin, Serum: 34 ng/mL (01-13-23 @ 05:55)      MICROBIOLOGY:    Culture - Blood (collected 01-12-23 @ 12:45)  Source: .Blood   Preliminary Report (01-13-23 @ 19:02):    No growth to date.    Culture - Blood (collected 01-12-23 @ 12:35)  Source: .Blood   Preliminary Report (01-13-23 @ 19:02):    No growth to date.      
PULMONARY  progress note    MONCHO GARVEY  [id number]    Patient is a 89y old  Female who presents with a chief complaint of Chest Pain (15 Shahab 2023 13:08)  no cough or sob, no chest  pain      MEDICATIONS  (STANDING):  albuterol    90 MICROgram(s) HFA Inhaler 2 Puff(s) Inhalation every 6 hours  apixaban 2.5 milliGRAM(s) Oral every 12 hours  lacosamide 100 milliGRAM(s) Oral two times a day  metoprolol tartrate 12.5 milliGRAM(s) Oral every 12 hours  oxybutynin XL 10 milliGRAM(s) Oral daily  pantoprazole    Tablet 40 milliGRAM(s) Oral before breakfast  sertraline 50 milliGRAM(s) Oral daily  sodium chloride 0.9%. 1000 milliLiter(s) (60 mL/Hr) IV Continuous <Continuous>      MEDICATIONS  (PRN):  acetaminophen     Tablet .. 650 milliGRAM(s) Oral every 6 hours PRN Temp greater or equal to 38C (100.4F), Mild Pain (1 - 3)  aluminum hydroxide/magnesium hydroxide/simethicone Suspension 30 milliLiter(s) Oral every 4 hours PRN Dyspepsia  melatonin 3 milliGRAM(s) Oral at bedtime PRN Insomnia  ondansetron Injectable 4 milliGRAM(s) IV Push every 8 hours PRN Nausea and/or Vomiting      Allergies    No Known Drug Allergies  Pineapple (Short breath)          PAST MEDICAL & SURGICAL HISTORY:  HTN (hypertension)      Epilepsy                     REVIEW OF SYSTEMS:  CONSTITUTIONAL: No fever, weight loss, or fatigue   EYES: No eye pain, visual disturbances, or discharge  ENT:  No difficulty hearing, tinnitus, vertigo; No sinus or throat pain  NECK: No pain or stiffness or nodes  RESPIRATORY:  cough--   wheezing --  chills--   hemoptysis--  Shortness of Breath--  CARDIOVASCULAR: No chest pain, palpitations, passing out, dizziness, or leg swelling  GASTROINTESTINAL: No abdominal or epigastric pain. No nausea, vomiting, or hematemesis;   GENITOURINARY: No dysuria, frequency, hematuria,  SKIN: No itching, burning, rashes, or lesions   LYMPH Nodes: No enlarged glands  ENDOCRINE: No heat or cold intolerance; No hair loss  HEME/LYMPH: No easy bruising, or bleeding gums  ALLERGY AND IMMUNOLOGIC: No hives or eczema        Vital Signs Last 24 Hrs  T(C): 36.3 (16 Jan 2023 07:15), Max: 36.6 (15 Shahab 2023 10:46)  T(F): 97.3 (16 Jan 2023 07:15), Max: 97.9 (15 Shahab 2023 10:46)  HR: 90 (16 Jan 2023 07:15) (72 - 93)  BP: 129/87 (16 Jan 2023 07:15) (114/48 - 152/85)  BP(mean): --  RR: 18 (16 Jan 2023 07:15) (16 - 20)  SpO2: 97% (16 Jan 2023 07:15) (95% - 98%)    Parameters below as of 16 Jan 2023 07:15  Patient On (Oxygen Delivery Method): room air      I&O's Detail    15 Shahab 2023 07:01  -  16 Jan 2023 07:00  --------------------------------------------------------  IN:    Oral Fluid: 200 mL  Total IN: 200 mL    OUT:    Indwelling Catheter - Urethral (mL): 950 mL  Total OUT: 950 mL    Total NET: -750 mL          PHYSICAL EXAMINATION:    GENERAL: The patient is a thin w/f in no apparent distress.   SKIN no rash ecchymoses or bruises  HEENT: Head is normocephalic and atraumatic  BARBARA , Extraocular muscles are intact. Mucous membranes  moist.   Neck supple ,No LN felt JVP not increased  Thyroid not enlarged  Cardiovascular:  S1 S2 heard, RSR, No JVD , systolic  murmur at apex, No gallop or rub  Respiratory: Chest wall symmetrical with good air entry ,Percussion note normal,    Lungs vesicular breathing with  no  rales  or  wheeze	  ABDOMEN:  Soft, Non-tender, no hepatomegaly or splenomegaly BS positive	  Extremities: Normal range of motion, No clubbing, cyanosis or edema  Vascular: Peripheral pulses palpable 2+ bilaterally  CNS:  Alert and responsive ,not oriented  Cranial nerves intact  sensory intact  motor power5/5  dtr 2+  Babinski neg        LABS:                        13.1   7.76  )-----------( 123      ( 16 Jan 2023 06:25 )             40.8     01-16    139  |  105  |  20<H>  ----------------------------<  94  4.5   |  26  |  0.74    Ca    9.1      16 Jan 2023 06:25    TPro  6.0  /  Alb  2.7<L>  /  TBili  0.7  /  DBili  x   /  AST  18  /  ALT  22  /  AlkPhos  99  01-16    Ferritin, Serum: 34 ng/mL (01-13-23 @ 05:55)      MICROBIOLOGY:    Culture - Blood (collected 01-12-23 @ 12:45)  Source: .Blood Blood  Preliminary Report (01-13-23 @ 19:02):    No growth to date.    Culture - Blood (collected 01-12-23 @ 12:35)  Source: .Blood Blood  Preliminary Report (01-13-23 @ 19:02):    No growth to date.      
Patient is a 89y old  Female who presents with a chief complaint of Chest Pain (13 Jan 2023 10:19)    OVERNIGHT EVENTS: on Tele - afib rate 80s    REVIEW OF SYSTEMS: Bulgarian speaking  [id number]  CONSTITUTIONAL: No fever, chills  ENMT:  +difficulty hearing, no change in vision  NECK: No pain or stiffness  RESPIRATORY: No cough, SOB  CARDIOVASCULAR: No chest pain, palpitations  GASTROINTESTINAL: No abdominal pain. No nausea, vomiting, or diarrhea  GENITOURINARY: No dysuria  NEUROLOGICAL: No HA  SKIN: No itching, burning, rashes, or lesions   LYMPH NODES: No enlarged glands  ENDOCRINE: No heat or cold intolerance; No hair loss  MUSCULOSKELETAL: No joint pain or swelling; No muscle, back, or extremity pain  PSYCHIATRIC: No depression, anxiety  HEME/LYMPH: No easy bruising, or bleeding gums    T(C): 36.7 (01-13-23 @ 10:10), Max: 37.7 (01-12-23 @ 21:24)  HR: 92 (01-13-23 @ 10:10) (71 - 105)  BP: 105/64 (01-13-23 @ 10:10) (84/55 - 146/83)  RR: 20 (01-13-23 @ 10:10) (18 - 20)  SpO2: 94% (01-13-23 @ 10:10) (94% - 99%)  Wt(kg): --Vital Signs Last 24 Hrs  T(C): 36.7 (13 Jan 2023 10:10), Max: 37.7 (12 Jan 2023 21:24)  T(F): 98.1 (13 Jan 2023 10:10), Max: 99.9 (12 Jan 2023 21:24)  HR: 92 (13 Jan 2023 10:10) (71 - 105)  BP: 105/64 (13 Jan 2023 10:10) (84/55 - 146/83)  BP(mean): --  RR: 20 (13 Jan 2023 10:10) (18 - 20)  SpO2: 94% (13 Jan 2023 10:10) (94% - 99%)    Parameters below as of 13 Jan 2023 10:10  Patient On (Oxygen Delivery Method): room air        MEDICATIONS  (STANDING):  dexAMETHasone  Injectable 6 milliGRAM(s) IV Push daily  enoxaparin Injectable 50 milliGRAM(s) SubCutaneous every 12 hours  lacosamide 100 milliGRAM(s) Oral two times a day  metoprolol tartrate 12.5 milliGRAM(s) Oral every 12 hours  oxybutynin XL 10 milliGRAM(s) Oral daily  pantoprazole    Tablet 40 milliGRAM(s) Oral before breakfast  remdesivir  IVPB   IV Intermittent   remdesivir  IVPB 100 milliGRAM(s) IV Intermittent every 24 hours  sertraline 50 milliGRAM(s) Oral daily  sodium chloride 0.9%. 1000 milliLiter(s) (60 mL/Hr) IV Continuous <Continuous>    MEDICATIONS  (PRN):  acetaminophen     Tablet .. 650 milliGRAM(s) Oral every 6 hours PRN Temp greater or equal to 38C (100.4F), Mild Pain (1 - 3)  aluminum hydroxide/magnesium hydroxide/simethicone Suspension 30 milliLiter(s) Oral every 4 hours PRN Dyspepsia  melatonin 3 milliGRAM(s) Oral at bedtime PRN Insomnia  ondansetron Injectable 4 milliGRAM(s) IV Push every 8 hours PRN Nausea and/or Vomiting      PHYSICAL EXAM:  GENERAL: thin, well-appearing, NAD  EYES: clear conjunctiva  ENMT: Moist mucous membranes  NECK: Supple, No JVD, Normal thyroid  CHEST/LUNG: Clear to auscultation bilaterally; No rales, rhonchi, wheezing, or rubs  HEART: S1, S2, Regular rate and rhythm  ABDOMEN: Soft, Nontender, Nondistended; Bowel sounds present  NEURO: Alert & Oriented X1 (name only, w/ hard of hearing)  EXTREMITIES: No LE edema, no calf tenderness  LYMPH: No lymphadenopathy noted  SKIN: No rashes or lesions    Consultant(s) Notes Reviewed:  [x ] YES  [ ] NO  Care Discussed with Consultants/Other Providers [ x] YES  [ ] NO    LABS:                        12.0   5.23  )-----------( 62       ( 13 Jan 2023 05:55 )             38.6     01-13    138  |  106  |  15  ----------------------------<  109<H>  3.8   |  23  |  0.71    Ca    8.5      13 Jan 2023 05:55  Mg     2.1     01-12    TPro  6.3  /  Alb  3.0<L>  /  TBili  0.5  /  DBili  x   /  AST  18  /  ALT  21  /  AlkPhos  100  01-13      CAPILLARY BLOOD GLUCOSE                RADIOLOGY & ADDITIONAL TESTS:    < from: Xray Chest 1 View-PORTABLE IMMEDIATE (01.12.23 @ 13:48) >    ACC: [id number] EXAM:  XR CHEST PORTABLE IMMED 1V                          PROCEDURE DATE:  01/12/2023          INTERPRETATION:  INDICATION: Shortness of breath    COMPARISON: 12/10/2021    FINDINGS:  An AP portable upright view of the chest shows chronic elevation of the   right hemidiaphragm, unchanged from the prior study. No infiltrates are   seen. There is no pneumothorax. No pleural effusions are demonstrated   there is no hilar or mediastinal widening. The thoracic aorta is   atherosclerotic and slightly tortuous. The cardiac silhouette is likely   magnified by technique and associated with a calcified mitral annulus,   but no CHF. There are degenerative changes of the spine and both   shoulders including upward subluxation of the right humeral head relative   to the glenoid where a chronic cystic lesion is present, unchanged.    IMPRESSION:  1. No acute cardiopulmonary abnormalities are seen.  2. Chronic elevation of the right hemidiaphragm.  3. Atherosclerosis.  4. Degenerative changes of the spine and shoulders along with upward   subluxation of the right humeral head. 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    --- End of Report ---            AYLIN SINGER MD; Attending Radiologist  This document has been electronically signed. Jan 12 2023  2:06PM    < end of copied text >  Imaging Personally Reviewed:  [ ] YES  [ ] NO  
PULMONARY  progress note    MONCHO GARVEY  [id number]    Patient is a 89y old  Female who presents with a chief complaint of Chest Pain (14 Jan 2023 13:18)  no sob or chest pain      MEDICATIONS  (STANDING):  apixaban 2.5 milliGRAM(s) Oral every 12 hours  lacosamide 100 milliGRAM(s) Oral two times a day  metoprolol tartrate 12.5 milliGRAM(s) Oral every 12 hours  oxybutynin XL 10 milliGRAM(s) Oral daily  pantoprazole    Tablet 40 milliGRAM(s) Oral before breakfast  predniSONE   Tablet 40 milliGRAM(s) Oral daily  sertraline 50 milliGRAM(s) Oral daily  sodium chloride 0.9%. 1000 milliLiter(s) (60 mL/Hr) IV Continuous <Continuous>      Allergies    No Known Drug Allergies  Pineapple (Short breath)            PAST MEDICAL & SURGICAL HISTORY:  HTN (hypertension)      Epilepsy      No significant past surgical history               REVIEW OF SYSTEMS:  CONSTITUTIONAL: No fever, weight loss, or fatigue   EYES: No eye pain, visual disturbances, or discharge  ENT:  No difficulty hearing, tinnitus, vertigo; No sinus or throat pain  NECK: No pain or stiffness or nodes  RESPIRATORY:  cough--   wheezing--   chills--   hemoptysis--  Shortness of Breath--  CARDIOVASCULAR: No chest pain, palpitations, passing out, dizziness, or leg swelling  GASTROINTESTINAL: No abdominal or epigastric pain. No nausea, vomiting, or hematemesis;   GENITOURINARY: No dysuria, frequency, hematuria, or incontinence  SKIN: No itching, burning, rashes, or lesions   LYMPH Nodes: No enlarged glands  ENDOCRINE: No heat or cold intolerance; No hair loss  MUSCULOSKELETAL: No joint pain or swelling; No muscle, back, or extremity pain  PSYCHIATRIC: No depression, anxiety, mood swings, or difficulty sleeping  HEME/LYMPH: No easy bruising, or bleeding gums  ALLERGY AND IMMUNOLOGIC: No hives or eczema    Vital Signs Last 24 Hrs  T(C): 36.6 (15 Shahab 2023 10:46), Max: 37.1 (14 Jan 2023 23:47)  T(F): 97.9 (15 Shahab 2023 10:46), Max: 98.7 (14 Jan 2023 23:47)  HR: 72 (15 Shahab 2023 11:49) (65 - 90)  BP: 114/48 (15 Shahab 2023 10:46) (114/48 - 153/83)  BP(mean): --  RR: 19 (15 Shahab 2023 10:46) (19 - 20)  SpO2: 95% (15 Shahab 2023 11:49) (92% - 96%)    Parameters below as of 15 Shahab 2023 11:49  Patient On (Oxygen Delivery Method): room air      I&O's Detail    14 Jan 2023 07:01  -  15 Shahab 2023 07:00  --------------------------------------------------------  IN:  Total IN: 0 mL    OUT:    Indwelling Catheter - Urethral (mL): 1400 mL  Total OUT: 1400 mL    Total NET: -1400 mL          PHYSICAL EXAMINATION:    GENERAL: The patient is a thin lady  in no apparent distress.   SKIN no rash ecchymoses or bruises  HEENT: Head is normocephalic and atraumatic  RACHAEL, E,xtraocular muscles are intact. Mucous membranes  moist.   Neck supple ,No LN felt JVP not increased  Thyroid not enlarged  Cardiovascular:  S1 S2 heard, ,AF, No JVD , systolic  murmur at apex, No gallop or rub  Respiratory: Chest wall symmetrical with good air entry ,Percussion note normal,    Lungs vesicular breathing with  no  rales  or  wheeze	  ABDOMEN:  Soft, Non-tender,  no hepatomegaly or splenomegaly BS positive	  Extremities: Normal range of motion, No clubbing, cyanosis or edema  Vascular: Peripheral pulses palpable 2+ bilaterally  CNS:  Alert and responsive  sensory intact  motor power5/5  dtr 2+  Babinski neg      Ferritin, Serum: 34 ng/mL (01-13-23 @ 05:55)      MICROBIOLOGY:    Culture - Blood (collected 01-12-23 @ 12:45)  Source: .Blood Blood  Preliminary Report (01-13-23 @ 19:02):    No growth to date.    Culture - Blood (collected 01-12-23 @ 12:35)  Source: .Blood Blood  Preliminary Report (01-13-23 @ 19:02):    No growth to date.      
PULMONARY  progress note    MONCHO GARVEY  [id number]    Patient is a 89y old  Female who presents with a chief complaint of Chest Pain (13 Jan 2023 12:35)  no chest pain or sob      MEDICATIONS  (STANDING):  apixaban 2.5 milliGRAM(s) Oral every 12 hours  lacosamide 100 milliGRAM(s) Oral two times a day  metoprolol tartrate 12.5 milliGRAM(s) Oral every 12 hours  oxybutynin XL 10 milliGRAM(s) Oral daily  pantoprazole    Tablet 40 milliGRAM(s) Oral before breakfast  predniSONE   Tablet 40 milliGRAM(s) Oral daily  sertraline 50 milliGRAM(s) Oral daily  sodium chloride 0.9%. 1000 milliLiter(s) (60 mL/Hr) IV Continuous <Continuous>      MEDICATIONS  (PRN):  acetaminophen     Tablet .. 650 milliGRAM(s) Oral every 6 hours PRN Temp greater or equal to 38C (100.4F), Mild Pain (1 - 3)  aluminum hydroxide/magnesium hydroxide/simethicone Suspension 30 milliLiter(s) Oral every 4 hours PRN Dyspepsia  melatonin 3 milliGRAM(s) Oral at bedtime PRN Insomnia  ondansetron Injectable 4 milliGRAM(s) IV Push every 8 hours PRN Nausea and/or Vomiting      Allergies    No Known Drug Allergies  Pineapple (Short breath)            PAST MEDICAL & SURGICAL HISTORY:  HTN (hypertension)      Epilepsy        REVIEW OF SYSTEMS:  CONSTITUTIONAL: No fever, weight loss, or fatigue   EYES: No eye pain, visual disturbances, or discharge  ENT:  No difficulty hearing, tinnitus, vertigo; No sinus or throat pain  NECK: No pain or stiffness or nodes  RESPIRATORY:  cough --  wheezing--   chills--   hemoptysis--  Shortness of Breath--  CARDIOVASCULAR: No chest pain, palpitations, passing out, dizziness, or leg swelling  GASTROINTESTINAL: No abdominal or epigastric pain. No nausea, vomiting,   GENITOURINARY: No dysuria, frequency, hematuria, or incontinence  NEUROLOGICAL: No headaches, memory loss, loss of strength, numbness, or tremors  SKIN: No itching, burning, rashes, or lesions   LYMPH Nodes: No enlarged glands  ENDOCRINE: No heat or cold intolerance; No hair loss  ALLERGY AND IMMUNOLOGIC: No hives or eczema      Vital Signs Last 24 Hrs  T(C): 36.6 (14 Jan 2023 04:50), Max: 36.7 (13 Jan 2023 23:39)  T(F): 97.8 (14 Jan 2023 04:50), Max: 98 (13 Jan 2023 23:39)  HR: 81 (14 Jan 2023 04:50) (75 - 81)  BP: 121/71 (14 Jan 2023 04:50) (99/54 - 121/71)  BP(mean): --  RR: 20 (14 Jan 2023 04:50) (18 - 20)  SpO2: 98% (14 Jan 2023 04:50) (96% - 98%)    Parameters below as of 14 Jan 2023 04:50  Patient On (Oxygen Delivery Method): room air      I&O's Detail      PHYSICAL EXAMINATION:    GENERAL: The patient is an elderly female in no apparent distress.   SKIN no rash ecchymoses or bruises  HEENT: Head is normocephalic and atraumatic  BARBARA , Extraocular muscles are intact. Mucous membranes  moist.   Neck supple ,No LN felt JVP not increased  Thyroid not enlarged  Cardiovascular:  S1 S2 heard,  AF, No JVD , systolic  murmur at apex, No gallop or rub  Respiratory: Chest wall symmetrical with good air entry ,Percussion note normal,    Lungs vesicular breathing with  no  rales  or  wheeze	  ABDOMEN:  Soft, Non-tender  no hepatomegaly or splenomegaly BS positive	  Extremities: Normal range of motion, No clubbing, cyanosis or edema  Vascular: Peripheral pulses palpable 2+ bilaterally  CNS:  Alert and oriented x3  Cranial nerves intact  sensory intact  motor power5/5  dtr 2+  Babinski neg        LABS:                        12.0   5.23  )-----------( 62       ( 13 Jan 2023 05:55 )             38.6     01-13    138  |  106  |  15  ----------------------------<  109<H>  3.8   |  23  |  0.71    Ca    8.5      13 Jan 2023 05:55    TPro  6.3  /  Alb  3.0<L>  /  TBili  0.5  /  DBili  x   /  AST  18  /  ALT  21  /  AlkPhos  100  01-13                  Serum Pro-Brain Natriuretic Peptide: 2401 pg/mL (01-12-23 @ 12:40)          Ferritin, Serum: 34 ng/mL (01-13-23 @ 05:55)      MICROBIOLOGY:    Culture - Blood (collected 01-12-23 @ 12:45)  Source: .Blood Blood  Preliminary Report (01-13-23 @ 19:02):    No growth to date.    Culture - Blood (collected 01-12-23 @ 12:35)  Source: .Blood Blood  Preliminary Report (01-13-23 @ 19:02):    No growth to date.          RADIOLOGY & ADDITIONAL STUDIES:    CXR: No infiltrate    
PATIENT SEEN AND EXAMINED ON :- 1/14/23  DATE OF SERVICE:  1/14/23           Interim events noted,Labs ,Radiological studies and Cardiology tests reviewed .       HOSPITAL COURSE: HPI:  89F from home, AAOx3,  ambulates with walker here for a 3 day history of chest pain, SOB. Patient stated that she has a son-in-law with the flu. COVID tests outside were negative. Cough was productive initially now dry. Pain is worse with cough, diffuse around the chest.  As per pt's daughter, the pt underwent OP cardiology w/u studies (including EKG and TTE)  last September which were all reported as normal.     At the time of assessment pt reports feeling ok, Denies fevers, chills,  SOB, chest pain, N/V, swelling  in the legs.  Endorses less PO intake. Covid vaccinated X 2 (last dose received on 12/21 as per pt's daughter)    In the ED, EKG showed new onset aflutter, CXR showed   IMPRESSION:  1. No acute cardiopulmonary abnormalities are seen.  2. Chronic elevation of the right hemidiaphragm.  3. Atherosclerosis.  4. Degenerative changes of the spine and shoulders along with upward   subluxation of the right humeral head. 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    Trop Negative  ProBNP slightly elevated  COVID Positive (12 Jan 2023 15:40)      INTERIM EVENTS:Patient seen at bedside ,interim events noted.      PMH -reviewed admission note, no change since admission  HEART FAILURE: Acute[ ]Chronic[ ] Systolic[ ] Diastolic[ ] Combined Systolic and Diastolic[ ]  CAD[ ] CABG[ ] PCI[ ]  DEVICES[ ] PPM[ ] ICD[ ] ILR[ ]  ATRIAL FIBRILLATION[ ] Paroxysmal[ ] Permanent[ ] CHADS2-[  ]  CECILE[ ] CKD1[ ] CKD2[ ] CKD3[ ] CKD4[ ] ESRD[ ]  COPD[ ] HTN[ ]   DM[ ] Type1[ ] Type 2[ ]   CVA[ ] Paresis[ ]    AMBULATION: Assisted[ ] Cane/walker[ ] Independent[ ]    MEDICATIONS  (STANDING):  apixaban 2.5 milliGRAM(s) Oral every 12 hours  lacosamide 100 milliGRAM(s) Oral two times a day  metoprolol tartrate 12.5 milliGRAM(s) Oral every 12 hours  oxybutynin XL 10 milliGRAM(s) Oral daily  pantoprazole    Tablet 40 milliGRAM(s) Oral before breakfast  predniSONE   Tablet 40 milliGRAM(s) Oral daily  sertraline 50 milliGRAM(s) Oral daily  sodium chloride 0.9%. 1000 milliLiter(s) (60 mL/Hr) IV Continuous <Continuous>    MEDICATIONS  (PRN):  acetaminophen     Tablet .. 650 milliGRAM(s) Oral every 6 hours PRN Temp greater or equal to 38C (100.4F), Mild Pain (1 - 3)  aluminum hydroxide/magnesium hydroxide/simethicone Suspension 30 milliLiter(s) Oral every 4 hours PRN Dyspepsia  melatonin 3 milliGRAM(s) Oral at bedtime PRN Insomnia  ondansetron Injectable 4 milliGRAM(s) IV Push every 8 hours PRN Nausea and/or Vomiting            REVIEW OF SYSTEMS:  Constitutional: [ ] fever, [ ]weight loss,  [ ]fatigue [ ]weight gain  Eyes: [ ] visual changes  Respiratory: [ ]shortness of breath;  [ ] cough, [ ]wheezing, [ ]chills, [ ]hemoptysis  Cardiovascular: [ ] chest pain, [ ]palpitations, [ ]dizziness,  [ ]leg swelling[ ]orthopnea[ ]PND  Gastrointestinal: [ ] abdominal pain, [ ]nausea, [ ]vomiting,  [ ]diarrhea [ ]Constipation [ ]Melena  Genitourinary: [ ] dysuria, [ ] hematuria [ ]Moya  Neurologic: [ ] headaches [ ] tremors[ ]weakness [ ]Paralysis Right[ ] Left[ ]  Skin: [ ] itching, [ ]burning, [ ] rashes  Endocrine: [ ] heat or cold intolerance  Musculoskeletal: [ ] joint pain or swelling; [ ] muscle, back, or extremity pain  Psychiatric: [ ] depression, [ ]anxiety, [ ]mood swings, or [ ]difficulty sleeping  Hematologic: [ ] easy bruising, [ ] bleeding gums    [ ] All remaining systems negative except as per above.   [ ]Unable to obtain.  [x] No change in ROS since admission      Vital Signs Last 24 Hrs  T(C): 36.7 (14 Jan 2023 19:48), Max: 36.7 (13 Jan 2023 23:39)  T(F): 98 (14 Jan 2023 19:48), Max: 98.1 (14 Jan 2023 16:00)  HR: 82 (14 Jan 2023 19:48) (80 - 90)  BP: 133/71 (14 Jan 2023 19:48) (117/63 - 153/83)  BP(mean): --  RR: 20 (14 Jan 2023 19:48) (20 - 20)  SpO2: 93% (14 Jan 2023 19:48) (93% - 98%)    Parameters below as of 14 Jan 2023 19:48  Patient On (Oxygen Delivery Method): room air      I&O's Summary    14 Jan 2023 07:01  -  14 Jan 2023 23:19  --------------------------------------------------------  IN: 0 mL / OUT: 900 mL / NET: -900 mL        PHYSICAL EXAM:  General: No acute distress BMI-  HEENT: EOMI, PERRL  Neck: Supple, [ ] JVD  Lungs: Equal air entry bilaterally; [ ] rales [ ] wheezing [ ] rhonchi  Heart: Regular rate and rhythm; [x ] murmur   2/6 [ x] systolic [ ] diastolic [ ] radiation[ ] rubs [ ]  gallops  Abdomen: Nontender, bowel sounds present  Extremities: No clubbing, cyanosis, [ ] edema [ ]Pulses  equal and intact  Nervous system:  Alert & Oriented X3, no focal deficits  Psychiatric: Normal affect  Skin: No rashes or lesions    LABS:  01-13    138  |  106  |  15  ----------------------------<  109<H>  3.8   |  23  |  0.71    Ca    8.5      13 Jan 2023 05:55    TPro  6.3  /  Alb  3.0<L>  /  TBili  0.5  /  DBili  x   /  AST  18  /  ALT  21  /  AlkPhos  100  01-13    Creatinine Trend: 0.71<--, 0.81<--                        12.0   5.23  )-----------( 62       ( 13 Jan 2023 05:55 )             38.6               
Patient is a 89y old  Female who presents with a chief complaint of Chest Pain     pt seen and examined    REVIEW OF SYSTEMS: Kyrgyz speaking  [id number]  CONSTITUTIONAL: No fever, chills  ENMT:  +difficulty hearing, no change in vision  NECK: No pain or stiffness  RESPIRATORY: No cough, SOB  CARDIOVASCULAR: No chest pain, palpitations  GASTROINTESTINAL: No abdominal pain. No nausea, vomiting, or diarrhea  GENITOURINARY: No dysuria  NEUROLOGICAL: No HA  SKIN: No itching, burning, rashes, or lesions   LYMPH NODES: No enlarged glands  ENDOCRINE: No heat or cold intolerance; No hair loss  MUSCULOSKELETAL: No joint pain or swelling; No muscle, back, or extremity pain  PSYCHIATRIC: No depression, anxiety  HEME/LYMPH: No easy bruising, or bleeding gums    MEDICATIONS  (STANDING):  albuterol    90 MICROgram(s) HFA Inhaler 2 Puff(s) Inhalation every 6 hours  apixaban 2.5 milliGRAM(s) Oral every 12 hours  lacosamide 100 milliGRAM(s) Oral two times a day  metoprolol tartrate 12.5 milliGRAM(s) Oral every 12 hours  oxybutynin XL 10 milliGRAM(s) Oral daily  pantoprazole    Tablet 40 milliGRAM(s) Oral before breakfast  predniSONE   Tablet 40 milliGRAM(s) Oral daily  sertraline 50 milliGRAM(s) Oral daily  sodium chloride 0.9%. 1000 milliLiter(s) (60 mL/Hr) IV Continuous <Continuous>    MEDICATIONS  (PRN):  acetaminophen     Tablet .. 650 milliGRAM(s) Oral every 6 hours PRN Temp greater or equal to 38C (100.4F), Mild Pain (1 - 3)  aluminum hydroxide/magnesium hydroxide/simethicone Suspension 30 milliLiter(s) Oral every 4 hours PRN Dyspepsia  melatonin 3 milliGRAM(s) Oral at bedtime PRN Insomnia  ondansetron Injectable 4 milliGRAM(s) IV Push every 8 hours PRN Nausea and/or Vomiting    Vital Signs Last 24 Hrs  T(C): 36.2 (01-15-23 @ 23:29), Max: 36.6 (01-15-23 @ 04:40)  T(F): 97.2 (01-15-23 @ 23:29), Max: 97.9 (01-15-23 @ 10:46)  HR: 86 (01-15-23 @ 23:29) (65 - 89)  BP: 152/85 (01-15-23 @ 23:29) (114/48 - 152/85)  BP(mean): --  RR: 16 (01-15-23 @ 23:29) (16 - 20)  SpO2: 95% (01-15-23 @ 23:29) (94% - 98%)    GENERAL: thin, well-appearing, NAD  EYES: clear conjunctiva  ENMT: Moist mucous membranes  NECK: Supple, No JVD, Normal thyroid  CHEST/LUNG: Clear to auscultation bilaterally; No rales, rhonchi, wheezing, or rubs  HEART: S1, S2, Regular rate and rhythm  ABDOMEN: Soft, Nontender, Nondistended; Bowel sounds present  NEURO: Alert & Oriented X1 (name only, w/ hard of hearing)  EXTREMITIES: No LE edema, no calf tenderness  LYMPH: No lymphadenopathy noted  SKIN: No rashes or lesions    Consultant(s) Notes Reviewed:  [x ] YES  [ ] NO  Care Discussed with Consultants/Other Providers [ x] YES  [ ] NO    LABS:        Creatinine Trend: 0.71 <--, 0.81 <--    Urine Studies:                        LIVER FUNCTIONS - ( 13 Jan 2023 05:55 )  Alb: 3.0 g/dL / Pro: 6.3 g/dL / ALK PHOS: 100 U/L / ALT: 21 U/L DA / AST: 18 U/L / GGT: x                   RADIOLOGY & ADDITIONAL TESTS:    < from: Xray Chest 1 View-PORTABLE IMMEDIATE (01.12.23 @ 13:48) >    ACC: [id number] EXAM:  XR CHEST PORTABLE IMMED 1V                          PROCEDURE DATE:  01/12/2023          INTERPRETATION:  INDICATION: Shortness of breath    COMPARISON: 12/10/2021    FINDINGS:  An AP portable upright view of the chest shows chronic elevation of the   right hemidiaphragm, unchanged from the prior study. No infiltrates are   seen. There is no pneumothorax. No pleural effusions are demonstrated   there is no hilar or mediastinal widening. The thoracic aorta is   atherosclerotic and slightly tortuous. The cardiac silhouette is likely   magnified by technique and associated with a calcified mitral annulus,   but no CHF. There are degenerative changes of the spine and both   shoulders including upward subluxation of the right humeral head relative   to the glenoid where a chronic cystic lesion is present, unchanged.    IMPRESSION:  1. No acute cardiopulmonary abnormalities are seen.  2. Chronic elevation of the right hemidiaphragm.  3. Atherosclerosis.  4. Degenerative changes of the spine and shoulders along with upward   subluxation of the right humeral head. 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    --- End of Report ---            AYLIN SINGER MD; Attending Radiologist  This document has been electronically signed. Jan 12 2023  2:06PM    < end of copied text >  Imaging Personally Reviewed:  [ ] YES  [ ] NO  
PATIENT SEEN AND EXAMINED ON :- 1/15/23  DATE OF SERVICE:  1/15/23           Interim events noted,Labs ,Radiological studies and Cardiology tests reviewed .       HOSPITAL COURSE: HPI:  89F from home, AAOx3,  ambulates with walker here for a 3 day history of chest pain, SOB. Patient stated that she has a son-in-law with the flu. COVID tests outside were negative. Cough was productive initially now dry. Pain is worse with cough, diffuse around the chest.  As per pt's daughter, the pt underwent OP cardiology w/u studies (including EKG and TTE)  last September which were all reported as normal.     At the time of assessment pt reports feeling ok, Denies fevers, chills,  SOB, chest pain, N/V, swelling  in the legs.  Endorses less PO intake. Covid vaccinated X 2 (last dose received on 12/21 as per pt's daughter)    In the ED, EKG showed new onset aflutter, CXR showed   IMPRESSION:  1. No acute cardiopulmonary abnormalities are seen.  2. Chronic elevation of the right hemidiaphragm.  3. Atherosclerosis.  4. Degenerative changes of the spine and shoulders along with upward   subluxation of the right humeral head. 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    Trop Negative  ProBNP slightly elevated  COVID Positive (12 Jan 2023 15:40)      INTERIM EVENTS:Patient seen at bedside ,interim events noted.      PMH -reviewed admission note, no change since admission  HEART FAILURE: Acute[ ]Chronic[ ] Systolic[ ] Diastolic[ ] Combined Systolic and Diastolic[ ]  CAD[ ] CABG[ ] PCI[ ]  DEVICES[ ] PPM[ ] ICD[ ] ILR[ ]  ATRIAL FIBRILLATION[ ] Paroxysmal[ ] Permanent[ ] CHADS2-[  ]  CECILE[ ] CKD1[ ] CKD2[ ] CKD3[ ] CKD4[ ] ESRD[ ]  COPD[ ] HTN[ ]   DM[ ] Type1[ ] Type 2[ ]   CVA[ ] Paresis[ ]    AMBULATION: Assisted[ ] Cane/walker[ ] Independent[ ]    MEDICATIONS  (STANDING):  albuterol    90 MICROgram(s) HFA Inhaler 2 Puff(s) Inhalation every 6 hours  apixaban 2.5 milliGRAM(s) Oral every 12 hours  lacosamide 100 milliGRAM(s) Oral two times a day  metoprolol tartrate 12.5 milliGRAM(s) Oral every 12 hours  oxybutynin XL 10 milliGRAM(s) Oral daily  pantoprazole    Tablet 40 milliGRAM(s) Oral before breakfast  predniSONE   Tablet 40 milliGRAM(s) Oral daily  sertraline 50 milliGRAM(s) Oral daily  sodium chloride 0.9%. 1000 milliLiter(s) (60 mL/Hr) IV Continuous <Continuous>    MEDICATIONS  (PRN):  acetaminophen     Tablet .. 650 milliGRAM(s) Oral every 6 hours PRN Temp greater or equal to 38C (100.4F), Mild Pain (1 - 3)  aluminum hydroxide/magnesium hydroxide/simethicone Suspension 30 milliLiter(s) Oral every 4 hours PRN Dyspepsia  melatonin 3 milliGRAM(s) Oral at bedtime PRN Insomnia  ondansetron Injectable 4 milliGRAM(s) IV Push every 8 hours PRN Nausea and/or Vomiting            REVIEW OF SYSTEMS:  Constitutional: [ ] fever, [ ]weight loss,  [ ]fatigue [ ]weight gain  Eyes: [ ] visual changes  Respiratory: [ ]shortness of breath;  [ ] cough, [ ]wheezing, [ ]chills, [ ]hemoptysis  Cardiovascular: [ ] chest pain, [ ]palpitations, [ ]dizziness,  [ ]leg swelling[ ]orthopnea[ ]PND  Gastrointestinal: [ ] abdominal pain, [ ]nausea, [ ]vomiting,  [ ]diarrhea [ ]Constipation [ ]Melena  Genitourinary: [ ] dysuria, [ ] hematuria [ ]Moya  Neurologic: [ ] headaches [ ] tremors[ ]weakness [ ]Paralysis Right[ ] Left[ ]  Skin: [ ] itching, [ ]burning, [ ] rashes  Endocrine: [ ] heat or cold intolerance  Musculoskeletal: [ ] joint pain or swelling; [ ] muscle, back, or extremity pain  Psychiatric: [ ] depression, [ ]anxiety, [ ]mood swings, or [ ]difficulty sleeping  Hematologic: [ ] easy bruising, [ ] bleeding gums    [ ] All remaining systems negative except as per above.   [ ]Unable to obtain.  [x] No change in ROS since admission      Vital Signs Last 24 Hrs  T(C): 36.6 (15 Shahab 2023 19:46), Max: 37.1 (14 Jan 2023 23:47)  T(F): 97.8 (15 Shahab 2023 19:46), Max: 98.7 (14 Jan 2023 23:47)  HR: 80 (15 Shahab 2023 19:46) (65 - 89)  BP: 116/77 (15 Shahab 2023 19:46) (114/48 - 146/65)  BP(mean): --  RR: 19 (15 Shahab 2023 19:46) (19 - 20)  SpO2: 98% (15 Shahab 2023 19:46) (92% - 98%)    Parameters below as of 15 Shahab 2023 19:46  Patient On (Oxygen Delivery Method): room air      I&O's Summary    14 Jan 2023 07:01  -  15 Shahab 2023 07:00  --------------------------------------------------------  IN: 0 mL / OUT: 1400 mL / NET: -1400 mL        PHYSICAL EXAM:  General: No acute distress BMI-  HEENT: EOMI, PERRL  Neck: Supple, [ ] JVD  Lungs: Equal air entry bilaterally; [ ] rales [ ] wheezing [ ] rhonchi  Heart: Regular rate and rhythm; [x ] murmur   2/6 [ x] systolic [ ] diastolic [ ] radiation[ ] rubs [ ]  gallops  Abdomen: Nontender, bowel sounds present  Extremities: No clubbing, cyanosis, [ ] edema [ ]Pulses  equal and intact  Nervous system:  Alert & Oriented X3, no focal deficits  Psychiatric: Normal affect  Skin: No rashes or lesions    LABS:        Creatinine Trend: 0.71<--, 0.81<--              
Patient is a 89y old  Female who presents with a chief complaint of Chest Pain (13 Jan 2023 10:19)    OVERNIGHT EVENTS: on Tele - afib rate 80s    REVIEW OF SYSTEMS: Telugu speaking  [id number]  CONSTITUTIONAL: No fever, chills  ENMT:  +difficulty hearing, no change in vision  NECK: No pain or stiffness  RESPIRATORY: No cough, SOB  CARDIOVASCULAR: No chest pain, palpitations  GASTROINTESTINAL: No abdominal pain. No nausea, vomiting, or diarrhea  GENITOURINARY: No dysuria  NEUROLOGICAL: No HA  SKIN: No itching, burning, rashes, or lesions   LYMPH NODES: No enlarged glands  ENDOCRINE: No heat or cold intolerance; No hair loss  MUSCULOSKELETAL: No joint pain or swelling; No muscle, back, or extremity pain  PSYCHIATRIC: No depression, anxiety  HEME/LYMPH: No easy bruising, or bleeding gums    T(C): 36.7 (01-13-23 @ 10:10), Max: 37.7 (01-12-23 @ 21:24)  HR: 92 (01-13-23 @ 10:10) (71 - 105)  BP: 105/64 (01-13-23 @ 10:10) (84/55 - 146/83)  RR: 20 (01-13-23 @ 10:10) (18 - 20)  SpO2: 94% (01-13-23 @ 10:10) (94% - 99%)  Wt(kg): --Vital Signs Last 24 Hrs  T(C): 36.7 (13 Jan 2023 10:10), Max: 37.7 (12 Jan 2023 21:24)  T(F): 98.1 (13 Jan 2023 10:10), Max: 99.9 (12 Jan 2023 21:24)  HR: 92 (13 Jan 2023 10:10) (71 - 105)  BP: 105/64 (13 Jan 2023 10:10) (84/55 - 146/83)  BP(mean): --  RR: 20 (13 Jan 2023 10:10) (18 - 20)  SpO2: 94% (13 Jan 2023 10:10) (94% - 99%)    Parameters below as of 13 Jan 2023 10:10  Patient On (Oxygen Delivery Method): room air        MEDICATIONS  (STANDING):  dexAMETHasone  Injectable 6 milliGRAM(s) IV Push daily  enoxaparin Injectable 50 milliGRAM(s) SubCutaneous every 12 hours  lacosamide 100 milliGRAM(s) Oral two times a day  metoprolol tartrate 12.5 milliGRAM(s) Oral every 12 hours  oxybutynin XL 10 milliGRAM(s) Oral daily  pantoprazole    Tablet 40 milliGRAM(s) Oral before breakfast  remdesivir  IVPB   IV Intermittent   remdesivir  IVPB 100 milliGRAM(s) IV Intermittent every 24 hours  sertraline 50 milliGRAM(s) Oral daily  sodium chloride 0.9%. 1000 milliLiter(s) (60 mL/Hr) IV Continuous <Continuous>    MEDICATIONS  (PRN):  acetaminophen     Tablet .. 650 milliGRAM(s) Oral every 6 hours PRN Temp greater or equal to 38C (100.4F), Mild Pain (1 - 3)  aluminum hydroxide/magnesium hydroxide/simethicone Suspension 30 milliLiter(s) Oral every 4 hours PRN Dyspepsia  melatonin 3 milliGRAM(s) Oral at bedtime PRN Insomnia  ondansetron Injectable 4 milliGRAM(s) IV Push every 8 hours PRN Nausea and/or Vomiting      PHYSICAL EXAM:  GENERAL: thin, well-appearing, NAD  EYES: clear conjunctiva  ENMT: Moist mucous membranes  NECK: Supple, No JVD, Normal thyroid  CHEST/LUNG: dec breath sounds at bases  HEART: S1, S2, Regular rate and rhythm  ABDOMEN: Soft, Nontender, Nondistended; Bowel sounds present  NEURO: Alert & Oriented X1 (name only, w/ hard of hearing)  EXTREMITIES: No LE edema, no calf tenderness  LYMPH: No lymphadenopathy noted  SKIN: No rashes or lesions    Consultant(s) Notes Reviewed:  [x ] YES  [ ] NO  Care Discussed with Consultants/Other Providers [ x] YES  [ ] NO    LABS:                        12.0   5.23  )-----------( 62       ( 13 Jan 2023 05:55 )             38.6     01-13    138  |  106  |  15  ----------------------------<  109<H>  3.8   |  23  |  0.71    Ca    8.5      13 Jan 2023 05:55  Mg     2.1     01-12    TPro  6.3  /  Alb  3.0<L>  /  TBili  0.5  /  DBili  x   /  AST  18  /  ALT  21  /  AlkPhos  100  01-13      CAPILLARY BLOOD GLUCOSE                RADIOLOGY & ADDITIONAL TESTS:    < from: Xray Chest 1 View-PORTABLE IMMEDIATE (01.12.23 @ 13:48) >    ACC: [id number] EXAM:  XR CHEST PORTABLE IMMED 1V                          PROCEDURE DATE:  01/12/2023          INTERPRETATION:  INDICATION: Shortness of breath    COMPARISON: 12/10/2021    FINDINGS:  An AP portable upright view of the chest shows chronic elevation of the   right hemidiaphragm, unchanged from the prior study. No infiltrates are   seen. There is no pneumothorax. No pleural effusions are demonstrated   there is no hilar or mediastinal widening. The thoracic aorta is   atherosclerotic and slightly tortuous. The cardiac silhouette is likely   magnified by technique and associated with a calcified mitral annulus,   but no CHF. There are degenerative changes of the spine and both   shoulders including upward subluxation of the right humeral head relative   to the glenoid where a chronic cystic lesion is present, unchanged.    IMPRESSION:  1. No acute cardiopulmonary abnormalities are seen.  2. Chronic elevation of the right hemidiaphragm.  3. Atherosclerosis.  4. Degenerative changes of the spine and shoulders along with upward   subluxation of the right humeral head. 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    --- End of Report ---            AYLIN SINGER MD; Attending Radiologist  This document has been electronically signed. Jan 12 2023  2:06PM    < end of copied text >  Imaging Personally Reviewed:  [ ] YES  [ ] NO  
PATIENT SEEN AND EXAMINED ON :- 1/13/23  DATE OF SERVICE:    1/13/23         Interim events noted,Labs ,Radiological studies and Cardiology tests reviewed .         HOSPITAL COURSE: HPI:  89F from home, AAOx3,  ambulates with walker here for a 3 day history of chest pain, SOB. Patient stated that she has a son-in-law with the flu. COVID tests outside were negative. Cough was productive initially now dry. Pain is worse with cough, diffuse around the chest.  As per pt's daughter, the pt underwent OP cardiology w/u studies (including EKG and TTE)  last September which were all reported as normal.     At the time of assessment pt reports feeling ok, Denies fevers, chills,  SOB, chest pain, N/V, swelling  in the legs.  Endorses less PO intake. Covid vaccinated X 2 (last dose received on 12/21 as per pt's daughter)    In the ED, EKG showed new onset aflutter, CXR showed   IMPRESSION:  1. No acute cardiopulmonary abnormalities are seen.  2. Chronic elevation of the right hemidiaphragm.  3. Atherosclerosis.  4. Degenerative changes of the spine and shoulders along with upward   subluxation of the right humeral head. 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    Trop Negative  ProBNP slightly elevated  COVID Positive (12 Jan 2023 15:40)      INTERIM EVENTS:Patient seen at bedside ,interim events noted.      PMH -reviewed admission note, no change since admission  HEART FAILURE: Acute[ ]Chronic[ ] Systolic[ ] Diastolic[ ] Combined Systolic and Diastolic[ ]  CAD[ ] CABG[ ] PCI[ ]  DEVICES[ ] PPM[ ] ICD[ ] ILR[ ]  ATRIAL FIBRILLATION[ ] Paroxysmal[ ] Permanent[ ] CHADS2-[  ]  CECILE[ ] CKD1[ ] CKD2[ ] CKD3[ ] CKD4[ ] ESRD[ ]  COPD[ ] HTN[ ]   DM[ ] Type1[ ] Type 2[ ]   CVA[ ] Paresis[ ]    AMBULATION: Assisted[ ] Cane/walker[ ] Independent[ ]    MEDICATIONS  (STANDING):  lacosamide 100 milliGRAM(s) Oral two times a day  metoprolol tartrate 12.5 milliGRAM(s) Oral every 12 hours  oxybutynin XL 10 milliGRAM(s) Oral daily  pantoprazole    Tablet 40 milliGRAM(s) Oral before breakfast  sertraline 50 milliGRAM(s) Oral daily  sodium chloride 0.9%. 1000 milliLiter(s) (60 mL/Hr) IV Continuous <Continuous>    MEDICATIONS  (PRN):  acetaminophen     Tablet .. 650 milliGRAM(s) Oral every 6 hours PRN Temp greater or equal to 38C (100.4F), Mild Pain (1 - 3)  aluminum hydroxide/magnesium hydroxide/simethicone Suspension 30 milliLiter(s) Oral every 4 hours PRN Dyspepsia  melatonin 3 milliGRAM(s) Oral at bedtime PRN Insomnia  ondansetron Injectable 4 milliGRAM(s) IV Push every 8 hours PRN Nausea and/or Vomiting            REVIEW OF SYSTEMS:  Constitutional: [ ] fever, [ ]weight loss,  [ ]fatigue [ ]weight gain  Eyes: [ ] visual changes  Respiratory: [ ]shortness of breath;  [ ] cough, [ ]wheezing, [ ]chills, [ ]hemoptysis  Cardiovascular: [ ] chest pain, [ ]palpitations, [ ]dizziness,  [ ]leg swelling[ ]orthopnea[ ]PND  Gastrointestinal: [ ] abdominal pain, [ ]nausea, [ ]vomiting,  [ ]diarrhea [ ]Constipation [ ]Melena  Genitourinary: [ ] dysuria, [ ] hematuria [ ]Moya  Neurologic: [ ] headaches [ ] tremors[ ]weakness [ ]Paralysis Right[ ] Left[ ]  Skin: [ ] itching, [ ]burning, [ ] rashes  Endocrine: [ ] heat or cold intolerance  Musculoskeletal: [ ] joint pain or swelling; [ ] muscle, back, or extremity pain  Psychiatric: [ ] depression, [ ]anxiety, [ ]mood swings, or [ ]difficulty sleeping  Hematologic: [ ] easy bruising, [ ] bleeding gums    [ ] All remaining systems negative except as per above.   [ ]Unable to obtain.  [x] No change in ROS since admission      Vital Signs Last 24 Hrs  T(C): 36.4 (13 Jan 2023 20:15), Max: 37 (12 Jan 2023 22:40)  T(F): 97.5 (13 Jan 2023 20:15), Max: 98.6 (12 Jan 2023 22:40)  HR: 80 (13 Jan 2023 20:15) (72 - 102)  BP: 110/62 (13 Jan 2023 20:15) (99/54 - 135/71)  BP(mean): --  RR: 20 (13 Jan 2023 20:15) (18 - 20)  SpO2: 98% (13 Jan 2023 20:15) (94% - 99%)    Parameters below as of 13 Jan 2023 20:15  Patient On (Oxygen Delivery Method): room air      I&O's Summary    12 Jan 2023 07:01  -  13 Jan 2023 07:00  --------------------------------------------------------  IN: 840 mL / OUT: 1650 mL / NET: -810 mL        PHYSICAL EXAM:  General: No acute distress BMI-  HEENT: EOMI, PERRL  Neck: Supple, [ ] JVD  Lungs: Equal air entry bilaterally; [ ] rales [ ] wheezing [ ] rhonchi  Heart: Regular rate and rhythm; [x ] murmur   2/6 [ x] systolic [ ] diastolic [ ] radiation[ ] rubs [ ]  gallops  Abdomen: Nontender, bowel sounds present  Extremities: No clubbing, cyanosis, [ ] edema [ ]Pulses  equal and intact  Nervous system:  Alert & Oriented X3, no focal deficits  Psychiatric: Normal affect  Skin: No rashes or lesions    LABS:  01-13    138  |  106  |  15  ----------------------------<  109<H>  3.8   |  23  |  0.71    Ca    8.5      13 Jan 2023 05:55  Mg     2.1     01-12    TPro  6.3  /  Alb  3.0<L>  /  TBili  0.5  /  DBili  x   /  AST  18  /  ALT  21  /  AlkPhos  100  01-13    Creatinine Trend: 0.71<--, 0.81<--                        12.0   5.23  )-----------( 62       ( 13 Jan 2023 05:55 )             38.6         Serum Pro-Brain Natriuretic Peptide: 2401 pg/mL (01-12-23 @ 12:40)        
Patient is a 89y old  Female who presents with a chief complaint of Chest Pain     pt seen and examined    CONSTITUTIONAL: No fever, chills  ENMT:  +difficulty hearing, no change in vision  NECK: No pain or stiffness  RESPIRATORY: No cough, SOB  CARDIOVASCULAR: No chest pain, palpitations  GASTROINTESTINAL: No abdominal pain. No nausea, vomiting, or diarrhea  GENITOURINARY: No dysuria  NEUROLOGICAL: No HA  SKIN: No itching, burning, rashes, or lesions   LYMPH NODES: No enlarged glands  ENDOCRINE: No heat or cold intolerance; No hair loss  MUSCULOSKELETAL: No joint pain or swelling; No muscle, back, or extremity pain  PSYCHIATRIC: No depression, anxiety  HEME/LYMPH: No easy bruising, or bleeding gums    MEDICATIONS  (STANDING):  albuterol    90 MICROgram(s) HFA Inhaler 2 Puff(s) Inhalation every 6 hours  apixaban 2.5 milliGRAM(s) Oral every 12 hours  lacosamide 100 milliGRAM(s) Oral two times a day  metoprolol tartrate 12.5 milliGRAM(s) Oral every 12 hours  oxybutynin XL 10 milliGRAM(s) Oral daily  pantoprazole    Tablet 40 milliGRAM(s) Oral before breakfast  sertraline 50 milliGRAM(s) Oral daily  sodium chloride 0.9%. 1000 milliLiter(s) (60 mL/Hr) IV Continuous <Continuous>    MEDICATIONS  (PRN):  acetaminophen     Tablet .. 650 milliGRAM(s) Oral every 6 hours PRN Temp greater or equal to 38C (100.4F), Mild Pain (1 - 3)  aluminum hydroxide/magnesium hydroxide/simethicone Suspension 30 milliLiter(s) Oral every 4 hours PRN Dyspepsia  melatonin 3 milliGRAM(s) Oral at bedtime PRN Insomnia  ondansetron Injectable 4 milliGRAM(s) IV Push every 8 hours PRN Nausea and/or Vomiting    Vital Signs Last 24 Hrs  T(C): 36.7 (01-16-23 @ 20:09), Max: 36.9 (01-16-23 @ 15:54)  T(F): 98 (01-16-23 @ 20:09), Max: 98.4 (01-16-23 @ 15:54)  HR: 91 (01-16-23 @ 20:09) (87 - 93)  BP: 136/73 (01-16-23 @ 20:09) (126/83 - 141/62)  BP(mean): --  RR: 18 (01-16-23 @ 20:09) (17 - 18)  SpO2: 98% (01-16-23 @ 20:09) (95% - 98%)    GENERAL: thin, well-appearing, NAD  EYES: clear conjunctiva  ENMT: Moist mucous membranes  NECK: Supple, No JVD, Normal thyroid  CHEST/LUNG: Clear to auscultation bilaterally; No rales, rhonchi, wheezing, or rubs  HEART: S1, S2, Regular rate and rhythm  ABDOMEN: Soft, Nontender, Nondistended; Bowel sounds present  NEURO: Alert & Oriented X1 (name only, w/ hard of hearing)  EXTREMITIES: No LE edema, no calf tenderness  LYMPH: No lymphadenopathy noted  SKIN: No rashes or lesions    LABS:  01-16    139  |  105  |  20<H>  ----------------------------<  94  4.5   |  26  |  0.74    Ca    9.1      16 Jan 2023 06:25    TPro  6.0  /  Alb  2.7<L>  /  TBili  0.7  /  DBili      /  AST  18  /  ALT  22  /  AlkPhos  99  01-16    Creatinine Trend: 0.74 <--, 0.71 <--, 0.81 <--                        13.1   7.76  )-----------( 123      ( 16 Jan 2023 06:25 )             40.8     Urine Studies:            LIVER FUNCTIONS - ( 16 Jan 2023 06:25 )  Alb: 2.7 g/dL / Pro: 6.0 g/dL / ALK PHOS: 99 U/L / ALT: 22 U/L DA / AST: 18 U/L / GGT: x                           LIVER FUNCTIONS - ( 13 Jan 2023 05:55 )  Alb: 3.0 g/dL / Pro: 6.3 g/dL / ALK PHOS: 100 U/L / ALT: 21 U/L DA / AST: 18 U/L / GGT: x                   RADIOLOGY & ADDITIONAL TESTS:    < from: Xray Chest 1 View-PORTABLE IMMEDIATE (01.12.23 @ 13:48) >    ACC: [id number] EXAM:  XR CHEST PORTABLE IMMED 1V                          PROCEDURE DATE:  01/12/2023          INTERPRETATION:  INDICATION: Shortness of breath    COMPARISON: 12/10/2021    FINDINGS:  An AP portable upright view of the chest shows chronic elevation of the   right hemidiaphragm, unchanged from the prior study. No infiltrates are   seen. There is no pneumothorax. No pleural effusions are demonstrated   there is no hilar or mediastinal widening. The thoracic aorta is   atherosclerotic and slightly tortuous. The cardiac silhouette is likely   magnified by technique and associated with a calcified mitral annulus,   but no CHF. There are degenerative changes of the spine and both   shoulders including upward subluxation of the right humeral head relative   to the glenoid where a chronic cystic lesion is present, unchanged.    IMPRESSION:  1. No acute cardiopulmonary abnormalities are seen.  2. Chronic elevation of the right hemidiaphragm.  3. Atherosclerosis.  4. Degenerative changes of the spine and shoulders along with upward   subluxation of the right humeral head. 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    --- End of Report ---            AYLIN SINGER MD; Attending Radiologist  This document has been electronically signed. Jan 12 2023  2:06PM    < end of copied text >  Imaging Personally Reviewed:  [ ] YES  [ ] NO  
Patient is a 89y old  Female who presents with a chief complaint of Chest Pain     pt seen and examined    REVIEW OF SYSTEMS: Polish speaking  [id number]  CONSTITUTIONAL: No fever, chills  ENMT:  +difficulty hearing, no change in vision  NECK: No pain or stiffness  RESPIRATORY: No cough, SOB  CARDIOVASCULAR: No chest pain, palpitations  GASTROINTESTINAL: No abdominal pain. No nausea, vomiting, or diarrhea  GENITOURINARY: No dysuria  NEUROLOGICAL: No HA  SKIN: No itching, burning, rashes, or lesions   LYMPH NODES: No enlarged glands  ENDOCRINE: No heat or cold intolerance; No hair loss  MUSCULOSKELETAL: No joint pain or swelling; No muscle, back, or extremity pain  PSYCHIATRIC: No depression, anxiety  HEME/LYMPH: No easy bruising, or bleeding gums    MEDICATIONS  (STANDING):  apixaban 2.5 milliGRAM(s) Oral every 12 hours  lacosamide 100 milliGRAM(s) Oral two times a day  metoprolol tartrate 12.5 milliGRAM(s) Oral every 12 hours  oxybutynin XL 10 milliGRAM(s) Oral daily  pantoprazole    Tablet 40 milliGRAM(s) Oral before breakfast  predniSONE   Tablet 40 milliGRAM(s) Oral daily  sertraline 50 milliGRAM(s) Oral daily  sodium chloride 0.9%. 1000 milliLiter(s) (60 mL/Hr) IV Continuous <Continuous>    MEDICATIONS  (PRN):  acetaminophen     Tablet .. 650 milliGRAM(s) Oral every 6 hours PRN Temp greater or equal to 38C (100.4F), Mild Pain (1 - 3)  aluminum hydroxide/magnesium hydroxide/simethicone Suspension 30 milliLiter(s) Oral every 4 hours PRN Dyspepsia  melatonin 3 milliGRAM(s) Oral at bedtime PRN Insomnia  ondansetron Injectable 4 milliGRAM(s) IV Push every 8 hours PRN Nausea and/or Vomiting    Vital Signs Last 24 Hrs  T(C): 36.7 (01-14-23 @ 16:00), Max: 36.7 (01-13-23 @ 23:39)  T(F): 98.1 (01-14-23 @ 16:00), Max: 98.1 (01-14-23 @ 16:00)  HR: 90 (01-14-23 @ 16:00) (80 - 90)  BP: 153/83 (01-14-23 @ 16:00) (110/62 - 153/83)  BP(mean): --  RR: 20 (01-14-23 @ 16:00) (20 - 20)  SpO2: 95% (01-14-23 @ 16:00) (95% - 98%)    GENERAL: thin, well-appearing, NAD  EYES: clear conjunctiva  ENMT: Moist mucous membranes  NECK: Supple, No JVD, Normal thyroid  CHEST/LUNG: Clear to auscultation bilaterally; No rales, rhonchi, wheezing, or rubs  HEART: S1, S2, Regular rate and rhythm  ABDOMEN: Soft, Nontender, Nondistended; Bowel sounds present  NEURO: Alert & Oriented X1 (name only, w/ hard of hearing)  EXTREMITIES: No LE edema, no calf tenderness  LYMPH: No lymphadenopathy noted  SKIN: No rashes or lesions    Consultant(s) Notes Reviewed:  [x ] YES  [ ] NO  Care Discussed with Consultants/Other Providers [ x] YES  [ ] NO    LABS:  01-13    138  |  106  |  15  ----------------------------<  109<H>  3.8   |  23  |  0.71    Ca    8.5      13 Jan 2023 05:55    TPro  6.3  /  Alb  3.0<L>  /  TBili  0.5  /  DBili      /  AST  18  /  ALT  21  /  AlkPhos  100  01-13    Creatinine Trend: 0.71 <--, 0.81 <--                        12.0   5.23  )-----------( 62       ( 13 Jan 2023 05:55 )             38.6     Urine Studies:            LIVER FUNCTIONS - ( 13 Jan 2023 05:55 )  Alb: 3.0 g/dL / Pro: 6.3 g/dL / ALK PHOS: 100 U/L / ALT: 21 U/L DA / AST: 18 U/L / GGT: x                   RADIOLOGY & ADDITIONAL TESTS:    < from: Xray Chest 1 View-PORTABLE IMMEDIATE (01.12.23 @ 13:48) >    ACC: [id number] EXAM:  XR CHEST PORTABLE IMMED 1V                          PROCEDURE DATE:  01/12/2023          INTERPRETATION:  INDICATION: Shortness of breath    COMPARISON: 12/10/2021    FINDINGS:  An AP portable upright view of the chest shows chronic elevation of the   right hemidiaphragm, unchanged from the prior study. No infiltrates are   seen. There is no pneumothorax. No pleural effusions are demonstrated   there is no hilar or mediastinal widening. The thoracic aorta is   atherosclerotic and slightly tortuous. The cardiac silhouette is likely   magnified by technique and associated with a calcified mitral annulus,   but no CHF. There are degenerative changes of the spine and both   shoulders including upward subluxation of the right humeral head relative   to the glenoid where a chronic cystic lesion is present, unchanged.    IMPRESSION:  1. No acute cardiopulmonary abnormalities are seen.  2. Chronic elevation of the right hemidiaphragm.  3. Atherosclerosis.  4. Degenerative changes of the spine and shoulders along with upward   subluxation of the right humeral head. 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    --- End of Report ---            AYLIN SINGER MD; Attending Radiologist  This document has been electronically signed. Jan 12 2023  2:06PM    < end of copied text >  Imaging Personally Reviewed:  [ ] YES  [ ] NO

## 2023-01-17 NOTE — PROGRESS NOTE ADULT - PROVIDER SPECIALTY LIST ADULT
Cardiology
Pulmonology
Internal Medicine
Cardiology
Internal Medicine

## 2023-01-17 NOTE — DISCHARGE NOTE PROVIDER - CARE PROVIDER_API CALL
jatinder alas  [address] [place] [address]  Phone: [phone]  Fax: (   )    -  Follow Up Time: 1 week    Elmer Rios)  Internal Medicine; Pulmonary Disease  [address] [place] [address]  Phone: [phone]  Fax: [phone]  Follow Up Time: Routine

## 2023-01-17 NOTE — DISCHARGE NOTE NURSING/CASE MANAGEMENT/SOCIAL WORK - PATIENT PORTAL LINK FT
You can access the FollowMyHealth Patient Portal offered by [facility] by registering at the following website: [email or web address]. By joining [facility]’s FollowMyHealth portal, you will also be able to view your health information using other applications (apps) compatible with our system.

## 2023-01-17 NOTE — PROGRESS NOTE ADULT - ASSESSMENT
Covid Bronchitis   Hcvd with MR with New A.Flutter        Plan-- D/c o2 and check o2 sat r/a  Lopressor, eliquis  Ventolin 2 puffs qid  OOB in chair / BRP

## 2023-01-17 NOTE — PROGRESS NOTE ADULT - TIME BILLING
I have examined pt personally Hx chart lab and xrays reviewed and pt discussed with staff and PMD
I have examined pt personally Hx chart lab and xrays reviewed and pt discussed with staff and PMD
- Review of records, telemetry, vital signs and daily labs.   - General and cardiovascular physical examination.  - Generation of cardiovascular treatment plan.  - Coordination of care.      Patient was seen and examined by me on 1/13/23interim events noted,labs and radiology studies reviewed.  Silvino Kimball MD,FACC.  [address] [place], [place] [id number].  [phone]
I have examined pt personally Hx chart lab and xrays reviewed and pt discussed with staff and PMD
I have examined pt personally Hx chart lab and xrays reviewed and pt discussed with staff and PMD
- Review of records, telemetry, vital signs and daily labs.   - General and cardiovascular physical examination.  - Generation of cardiovascular treatment plan.  - Coordination of care.      Patient was seen and examined by me on 1/14/23interim events noted,labs and radiology studies reviewed.  Silvino Kimball MD,FACC.  [address] [place], [place] [id number].  [phone]
- Review of records, telemetry, vital signs and daily labs.   - General and cardiovascular physical examination.  - Generation of cardiovascular treatment plan.  - Coordination of care.      Patient was seen and examined by me on 1/15/23interim events noted,labs and radiology studies reviewed.  Silvino Kimball MD,FACC.  [address] [place], [place] [id number].  [phone]
- Review of records, telemetry, vital signs and daily labs.   - General and cardiovascular physical examination.  - Generation of cardiovascular treatment plan.  - Coordination of care.      Patient was seen and examined by me on 1/16/23interim events noted,labs and radiology studies reviewed.  Silvino Kimball MD,FACC.  [address] [place], [place] [id number].  [phone]

## 2023-01-17 NOTE — DISCHARGE NOTE PROVIDER - NSDCCPCAREPLAN_GEN_ALL_CORE_FT
PRINCIPAL DISCHARGE DIAGNOSIS  Diagnosis: New onset atrial flutter  Assessment and Plan of Treatment: you initially came into hospital with coughing and was found to have covid.   Atrial flutter is an irregular heartbeat. It reduces your heart's ability to pump blood through your body. Atrial flutter can cause life-threatening blood clots, stroke, or heart failure. It is important to treat and manage a-fib to help prevent these problems.  Antiplatelet or blood thinner medicines help prevent blood clots and stroke.  you were seen by our cardiologist Dr. Kimball.   you are started on metoprolol succinate 25 mg daily to control your heart rate and eliquis 2.5 mg twice a day to prevent blood clots.         SECONDARY DISCHARGE DIAGNOSES  Diagnosis: Acute respiratory failure with hypoxia  Assessment and Plan of Treatment: you had shortness of breath due to covid.   you had a chest xray that did not show any pneumonia.  you were seen by a pulmonologist Dr. Rios, you can follow up with him outpatient.   Acute Respiratory Failure is a condition that happens when your lungs cannot get enough oxygen into your blood. ARF can also happen when your lungs cannot get the carbon dioxide out of your blood. A buildup of carbon dioxide in your blood can cause damage to your organs. The decrease in oxygen and the buildup of carbon dioxide can happen at the same time. Acute respiratory failure may develop in minutes, hours, or days.  How is ARF treated? Treatment depends on the cause and how severe your symptoms are. You may need any of the following:  Medicines may be given to open your airways or relieve fluid buildup in your arms or legs. You may also need medicines to treat the cause of your ARF.  Call your local emergency number (911 in the [place]) or have someone call if:  You have more trouble catching your breath.  You have stopped breathing.  Extra oxygen may be given if your blood oxygen levels are low.      Diagnosis: 2019 novel coronavirus disease (COVID-19)  Assessment and Plan of Treatment: you were given remdesivir and decadron and now your breathing is improved.   covid is spread from person to person after close contact with the infected by droplets that become airborne after a cough or sneeze. Transmission can also occur by touching contaminated surfaces. Wash your hands, cover your cough, disinfect, avoid close contact. wear a mask until your symptoms resolve.  you were found covid positive on date: 1/12/2023  you need to continue to isolate for 10 days since diagnosis. wear a mask.   continue taking tylenol 650 mg for fever 100.4F or more, or if you have mild body aches      Diagnosis: Urinary retention  Assessment and Plan of Treatment: during your hospital stay you had an episode of urinary rentention and required a wells.   this could be due to your home medication oxybutynin.   follow up with your primary care doctor if you should continue taking this medication if you continue to have issues urinating.    Diagnosis: Seizures  Assessment and Plan of Treatment: continue taking your home medication vimpat 100 mg twice a day    Diagnosis: HTN (hypertension)  Assessment and Plan of Treatment: your blood pressure ranged between: ) (113/63 - 141/62)  hold off on taking  Amlodipine 2.5 and Benazapril 10mg daily due controlled blood pressures.   you were just started on a new medication metoprolol succinate.  follow up with your primary care doctor or cardiologist if you need to continue amlodipine/benazepril.   try to take your blood pressure readings twice a day, morning and night time.   Notify your doctor if you have any of the following symptoms:   Dizziness, Lightheadedness, Blurry vision, Headache, Chest pain, Shortness of breath      Diagnosis: Anxiety  Assessment and Plan of Treatment: continue taking your home medicaiton Sertraline 50mg twice a day

## 2023-01-17 NOTE — PROGRESS NOTE ADULT - REASON FOR ADMISSION
Chest Pain

## 2023-01-17 NOTE — DISCHARGE NOTE PROVIDER - HOSPITAL COURSE
89 y/o F from home, hx of covid vaccine x2, ambulates with walker here for a 3 day history of chest pain, SOB, concerning for acute hypoxic respiratory failure, found to be COVID positive and was admitted to telemetry due to new onset aflutter. Cardiology Dr. Kimball following. CXR no PNA. currently on full dose lovenox, switch to eliquis 2.5 mg bid.   s/p decadron and remdesivir (1/12-1/13). s/p prednisone. Pulm Dr. Rios following.   Now transitioned to room air. Pending echocardiogram, delayed due to covid.   During hospitalization, pt had urinary retention and wells was placed on 1/12. passed tov.   PT had recommended EDUARDA but family wants to take pt home.     pt is medically optimized for discharge. dc on eliquis 2.5 mg bid and toprol xl 25 mg daily.

## 2023-01-17 NOTE — DISCHARGE NOTE NURSING/CASE MANAGEMENT/SOCIAL WORK - NSDCPEFALRISK_GEN_ALL_CORE
For information on Fall & Injury Prevention, visit: [email or web address] OR  [email or web address] OR  https://www.cdc.gov/steadi/patient.html

## 2023-01-17 NOTE — ADVANCED PRACTICE NURSE CONSULT - ASSESSMENT
This is a 89yr old female patient admitted for A. Fib, presenting with the following:  -There is a Stage 1 Pressure Injury to the Bilateral Heels, as evident by non-blanchable erythema  -There is evidence of Incontinence Associated Dermatitis to the Perianal and Gluteal Fold areas

## 2023-01-17 NOTE — ADVANCED PRACTICE NURSE CONSULT - RECOMMEDATIONS
-Clean all affected areas with warm water, mild soap, pat dry, and apply skin prep to the surrounding skin  -Apply Zinc Oxide Moisture Barrier Cream to the Perianal and Gluteal Fold areas b.i.d. PRN  -Frequent toileting  -Elevate/float the patients heels using heel protectors and reposition the patient Q 2hrs using wedges or pillows

## 2023-01-17 NOTE — DISCHARGE NOTE PROVIDER - NSDCMRMEDTOKEN_GEN_ALL_CORE_FT
apixaban 2.5 mg oral tablet: 1 tab(s) orally every 12 hours  oxybutynin 10 mg/24 hr oral tablet, extended release: 1 tab(s) orally once a day  sertraline 50 mg oral tablet: 1 tab(s) orally once a day  Toprol-XL 25 mg oral tablet, extended release: 1 tab(s) orally once a day   Vimpat 100 mg oral tablet: 1 tab(s) orally 2 times a day   apixaban 2.5 mg oral tablet: 1 tab(s) orally every 12 hours  sertraline 50 mg oral tablet: 1 tab(s) orally once a day  Toprol-XL 25 mg oral tablet, extended release: 1 tab(s) orally once a day   Vimpat 100 mg oral tablet: 1 tab(s) orally 2 times a day

## 2023-02-28 ENCOUNTER — INPATIENT (INPATIENT)
Facility: HOSPITAL | Age: 88
LOS: 8 days | Discharge: NURSING, REHAB OR LONG-TERM CARE | DRG: 552 | End: 2023-03-09
Attending: INTERNAL MEDICINE | Admitting: INTERNAL MEDICINE
Payer: MEDICARE

## 2023-02-28 VITALS
HEIGHT: 55 IN | RESPIRATION RATE: 18 BRPM | HEART RATE: 98 BPM | TEMPERATURE: 99 F | OXYGEN SATURATION: 97 % | DIASTOLIC BLOOD PRESSURE: 79 MMHG | SYSTOLIC BLOOD PRESSURE: 148 MMHG

## 2023-02-28 DIAGNOSIS — R82.90 UNSPECIFIED ABNORMAL FINDINGS IN URINE: ICD-10-CM

## 2023-02-28 DIAGNOSIS — E04.1 NONTOXIC SINGLE THYROID NODULE: ICD-10-CM

## 2023-02-28 DIAGNOSIS — F41.9 ANXIETY DISORDER, UNSPECIFIED: ICD-10-CM

## 2023-02-28 DIAGNOSIS — R32 UNSPECIFIED URINARY INCONTINENCE: ICD-10-CM

## 2023-02-28 DIAGNOSIS — I10 ESSENTIAL (PRIMARY) HYPERTENSION: ICD-10-CM

## 2023-02-28 DIAGNOSIS — I48.92 UNSPECIFIED ATRIAL FLUTTER: ICD-10-CM

## 2023-02-28 DIAGNOSIS — Z29.9 ENCOUNTER FOR PROPHYLACTIC MEASURES, UNSPECIFIED: ICD-10-CM

## 2023-02-28 DIAGNOSIS — R31.9 HEMATURIA, UNSPECIFIED: ICD-10-CM

## 2023-02-28 DIAGNOSIS — G40.909 EPILEPSY, UNSPECIFIED, NOT INTRACTABLE, WITHOUT STATUS EPILEPTICUS: ICD-10-CM

## 2023-02-28 DIAGNOSIS — R29.6 REPEATED FALLS: ICD-10-CM

## 2023-02-28 DIAGNOSIS — S01.81XA LACERATION WITHOUT FOREIGN BODY OF OTHER PART OF HEAD, INITIAL ENCOUNTER: ICD-10-CM

## 2023-02-28 LAB
ANION GAP SERPL CALC-SCNC: 5 MMOL/L — SIGNIFICANT CHANGE UP (ref 5–17)
APPEARANCE UR: CLEAR — SIGNIFICANT CHANGE UP
BASOPHILS # BLD AUTO: 0.03 K/UL — SIGNIFICANT CHANGE UP (ref 0–0.2)
BASOPHILS NFR BLD AUTO: 0.4 % — SIGNIFICANT CHANGE UP (ref 0–2)
BILIRUB UR-MCNC: NEGATIVE — SIGNIFICANT CHANGE UP
BUN SERPL-MCNC: 22 MG/DL — HIGH (ref 7–18)
CALCIUM SERPL-MCNC: 9 MG/DL — SIGNIFICANT CHANGE UP (ref 8.4–10.5)
CHLORIDE SERPL-SCNC: 110 MMOL/L — HIGH (ref 96–108)
CK SERPL-CCNC: 101 U/L — SIGNIFICANT CHANGE UP (ref 21–215)
CO2 SERPL-SCNC: 24 MMOL/L — SIGNIFICANT CHANGE UP (ref 22–31)
COLOR SPEC: YELLOW — SIGNIFICANT CHANGE UP
CREAT SERPL-MCNC: 0.76 MG/DL — SIGNIFICANT CHANGE UP (ref 0.5–1.3)
DIFF PNL FLD: ABNORMAL
EGFR: 73 ML/MIN/1.73M2 — SIGNIFICANT CHANGE UP
EOSINOPHIL # BLD AUTO: 0.2 K/UL — SIGNIFICANT CHANGE UP (ref 0–0.5)
EOSINOPHIL NFR BLD AUTO: 3 % — SIGNIFICANT CHANGE UP (ref 0–6)
GLUCOSE SERPL-MCNC: 97 MG/DL — SIGNIFICANT CHANGE UP (ref 70–99)
GLUCOSE UR QL: NEGATIVE — SIGNIFICANT CHANGE UP
HCT VFR BLD CALC: 39.5 % — SIGNIFICANT CHANGE UP (ref 34.5–45)
HGB BLD-MCNC: 12 G/DL — SIGNIFICANT CHANGE UP (ref 11.5–15.5)
IMM GRANULOCYTES NFR BLD AUTO: 0.3 % — SIGNIFICANT CHANGE UP (ref 0–0.9)
KETONES UR-MCNC: NEGATIVE — SIGNIFICANT CHANGE UP
LEUKOCYTE ESTERASE UR-ACNC: NEGATIVE — SIGNIFICANT CHANGE UP
LYMPHOCYTES # BLD AUTO: 1.16 K/UL — SIGNIFICANT CHANGE UP (ref 1–3.3)
LYMPHOCYTES # BLD AUTO: 17.4 % — SIGNIFICANT CHANGE UP (ref 13–44)
MCHC RBC-ENTMCNC: 25.8 PG — LOW (ref 27–34)
MCHC RBC-ENTMCNC: 30.4 GM/DL — LOW (ref 32–36)
MCV RBC AUTO: 84.8 FL — SIGNIFICANT CHANGE UP (ref 80–100)
MONOCYTES # BLD AUTO: 0.41 K/UL — SIGNIFICANT CHANGE UP (ref 0–0.9)
MONOCYTES NFR BLD AUTO: 6.1 % — SIGNIFICANT CHANGE UP (ref 2–14)
NEUTROPHILS # BLD AUTO: 4.85 K/UL — SIGNIFICANT CHANGE UP (ref 1.8–7.4)
NEUTROPHILS NFR BLD AUTO: 72.8 % — SIGNIFICANT CHANGE UP (ref 43–77)
NITRITE UR-MCNC: NEGATIVE — SIGNIFICANT CHANGE UP
NRBC # BLD: 0 /100 WBCS — SIGNIFICANT CHANGE UP (ref 0–0)
PH UR: 6.5 — SIGNIFICANT CHANGE UP (ref 5–8)
PLATELET # BLD AUTO: 166 K/UL — SIGNIFICANT CHANGE UP (ref 150–400)
POTASSIUM SERPL-MCNC: 4.4 MMOL/L — SIGNIFICANT CHANGE UP (ref 3.5–5.3)
POTASSIUM SERPL-SCNC: 4.4 MMOL/L — SIGNIFICANT CHANGE UP (ref 3.5–5.3)
PROT UR-MCNC: 15
RBC # BLD: 4.66 M/UL — SIGNIFICANT CHANGE UP (ref 3.8–5.2)
RBC # FLD: 14.6 % — HIGH (ref 10.3–14.5)
SARS-COV-2 RNA SPEC QL NAA+PROBE: SIGNIFICANT CHANGE UP
SODIUM SERPL-SCNC: 139 MMOL/L — SIGNIFICANT CHANGE UP (ref 135–145)
SP GR SPEC: 1.01 — SIGNIFICANT CHANGE UP (ref 1.01–1.02)
TROPONIN I, HIGH SENSITIVITY RESULT: 10.2 NG/L — SIGNIFICANT CHANGE UP
TSH SERPL-MCNC: 2.64 UU/ML — SIGNIFICANT CHANGE UP (ref 0.34–4.82)
UROBILINOGEN FLD QL: NEGATIVE — SIGNIFICANT CHANGE UP
WBC # BLD: 6.67 K/UL — SIGNIFICANT CHANGE UP (ref 3.8–10.5)
WBC # FLD AUTO: 6.67 K/UL — SIGNIFICANT CHANGE UP (ref 3.8–10.5)

## 2023-02-28 PROCEDURE — 72125 CT NECK SPINE W/O DYE: CPT | Mod: 26,MA

## 2023-02-28 PROCEDURE — 71045 X-RAY EXAM CHEST 1 VIEW: CPT | Mod: 26

## 2023-02-28 PROCEDURE — 99285 EMERGENCY DEPT VISIT HI MDM: CPT

## 2023-02-28 PROCEDURE — 70450 CT HEAD/BRAIN W/O DYE: CPT | Mod: 26,MA

## 2023-02-28 RX ORDER — SERTRALINE 25 MG/1
0 TABLET, FILM COATED ORAL
Qty: 0 | Refills: 0 | DISCHARGE

## 2023-02-28 RX ORDER — AMLODIPINE BESYLATE 2.5 MG/1
0 TABLET ORAL
Qty: 0 | Refills: 0 | DISCHARGE

## 2023-02-28 RX ORDER — APIXABAN 2.5 MG/1
2.5 TABLET, FILM COATED ORAL EVERY 12 HOURS
Refills: 0 | Status: DISCONTINUED | OUTPATIENT
Start: 2023-02-28 | End: 2023-03-09

## 2023-02-28 RX ORDER — LACOSAMIDE 50 MG/1
0 TABLET ORAL
Qty: 0 | Refills: 0 | DISCHARGE

## 2023-02-28 RX ORDER — SERTRALINE 25 MG/1
1 TABLET, FILM COATED ORAL
Qty: 0 | Refills: 0 | DISCHARGE

## 2023-02-28 RX ORDER — APIXABAN 2.5 MG/1
0 TABLET, FILM COATED ORAL
Qty: 0 | Refills: 0 | DISCHARGE

## 2023-02-28 RX ORDER — LACOSAMIDE 50 MG/1
100 TABLET ORAL
Refills: 0 | Status: DISCONTINUED | OUTPATIENT
Start: 2023-02-28 | End: 2023-03-09

## 2023-02-28 RX ORDER — PANTOPRAZOLE SODIUM 20 MG/1
40 TABLET, DELAYED RELEASE ORAL
Refills: 0 | Status: DISCONTINUED | OUTPATIENT
Start: 2023-02-28 | End: 2023-03-09

## 2023-02-28 RX ORDER — AMLODIPINE BESYLATE 2.5 MG/1
2.5 TABLET ORAL DAILY
Refills: 0 | Status: DISCONTINUED | OUTPATIENT
Start: 2023-02-28 | End: 2023-03-09

## 2023-02-28 RX ORDER — METOPROLOL TARTRATE 50 MG
0 TABLET ORAL
Qty: 0 | Refills: 0 | DISCHARGE

## 2023-02-28 RX ORDER — ESOMEPRAZOLE MAGNESIUM 40 MG/1
0 CAPSULE, DELAYED RELEASE ORAL
Qty: 0 | Refills: 0 | DISCHARGE

## 2023-02-28 RX ORDER — OXYBUTYNIN CHLORIDE 5 MG
5 TABLET ORAL DAILY
Refills: 0 | Status: DISCONTINUED | OUTPATIENT
Start: 2023-02-28 | End: 2023-03-09

## 2023-02-28 RX ORDER — SERTRALINE 25 MG/1
100 TABLET, FILM COATED ORAL DAILY
Refills: 0 | Status: DISCONTINUED | OUTPATIENT
Start: 2023-02-28 | End: 2023-03-09

## 2023-02-28 RX ORDER — OXYBUTYNIN CHLORIDE 5 MG
5 TABLET ORAL DAILY
Refills: 0 | Status: DISCONTINUED | OUTPATIENT
Start: 2023-02-28 | End: 2023-02-28

## 2023-02-28 RX ORDER — OXYBUTYNIN CHLORIDE 5 MG
0 TABLET ORAL
Qty: 0 | Refills: 0 | DISCHARGE

## 2023-02-28 RX ORDER — METOPROLOL TARTRATE 50 MG
25 TABLET ORAL DAILY
Refills: 0 | Status: DISCONTINUED | OUTPATIENT
Start: 2023-02-28 | End: 2023-03-09

## 2023-02-28 RX ORDER — TETANUS TOXOID, REDUCED DIPHTHERIA TOXOID AND ACELLULAR PERTUSSIS VACCINE, ADSORBED 5; 2.5; 8; 8; 2.5 [IU]/.5ML; [IU]/.5ML; UG/.5ML; UG/.5ML; UG/.5ML
0.5 SUSPENSION INTRAMUSCULAR ONCE
Refills: 0 | Status: COMPLETED | OUTPATIENT
Start: 2023-02-28 | End: 2023-02-28

## 2023-02-28 RX ORDER — TETANUS TOXOID, REDUCED DIPHTHERIA TOXOID AND ACELLULAR PERTUSSIS VACCINE, ADSORBED 5; 2.5; 8; 8; 2.5 [IU]/.5ML; [IU]/.5ML; UG/.5ML; UG/.5ML; UG/.5ML
0.5 SUSPENSION INTRAMUSCULAR ONCE
Refills: 0 | Status: DISCONTINUED | OUTPATIENT
Start: 2023-02-28 | End: 2023-02-28

## 2023-02-28 RX ADMIN — SERTRALINE 100 MILLIGRAM(S): 25 TABLET, FILM COATED ORAL at 14:59

## 2023-02-28 RX ADMIN — TETANUS TOXOID, REDUCED DIPHTHERIA TOXOID AND ACELLULAR PERTUSSIS VACCINE, ADSORBED 0.5 MILLILITER(S): 5; 2.5; 8; 8; 2.5 SUSPENSION INTRAMUSCULAR at 09:01

## 2023-02-28 RX ADMIN — Medication 5 MILLIGRAM(S): at 14:58

## 2023-02-28 NOTE — PHYSICAL THERAPY INITIAL EVALUATION ADULT - IMPAIRMENTS FOUND, PT EVAL
skin integ/cognitive impairment/gait, locomotion, and balance/muscle strength/poor safety awareness/posture/ROM

## 2023-02-28 NOTE — PATIENT PROFILE ADULT - LANGUAGE ASSISTANCE NEEDED
Regarding: :looking for Rx  ----- Message from Winnie Barrow sent at 10/16/2019  8:24 PM CDT -----  Patient Name: Nacho Preciado Jr.  Specialist or PCP Full Name:robles  Pregnant (If Yes, how long?):  Symptoms:looking for Rx  Call Back #:[phone]  Is the patient’s permanent residence located in [place], [place], or a [facility]? Yes [facility] [phone]  Call Center Account #:[id number]         No-Patient/Caregiver offered and refused free interpretation services.

## 2023-02-28 NOTE — H&P ADULT - NSHPREVIEWOFSYSTEMS_GEN_ALL_CORE
ROS  Constitutional: (-)fever, (-)night sweats, (-)chills, (-)cold intolerance, (-)fatigue  Eyes: (-)change in vision, (-)loss of vision, (-)blurred vision  Ears: (-)difficulty hearing, (-)hearing loss  Nose: (-)nasal discharge  Mouth/Throat/Voice: (-)lip sores, (-)dysphagia, (-)odynophagia  Neck: (-)neck pain, (-)neck stiffness, (-)neck lumps, (-)neck swelling  Respiratory: (-)dyspnea, (-)cough, (-)cough productive of sputum, (-)hemoptysis, (-)wheezing  Cardiovascular: (-)chest pain, (-)palpitations, (-)dyspnea at rest, (-)dyspnea with activity, (-)orthopnea  Gastrointestinal: (-)abdominal pain, (-)rectal pain, (-)nausea, (-)vomiting  Urinary: (-)dysuria, (-)hematuria, (+)urinary incontinence   Genital/Reproductive: (-)change in libido  Dermatologic/Integumentary: (-)change in hair texture, (-)change in skin texture, (-)change in nail appearance, (-)dry hair  Musculoskeletal: (-)muscle pain, (-)back pain, (-)tender points, (-)muscle cramps  Neurological: (-)headaches, (-)vertigo, (-)lightheadedness, (-)fainting  Psychiatric: (-)change in mood, (-)depression, (-)sadness interfering with function, (-)anxiety, (-)nervousness  Hematologic/Lymphatic: (-)easy bruising, (-)difficulty stopping blood flow

## 2023-02-28 NOTE — PHYSICAL THERAPY INITIAL EVALUATION ADULT - GAIT DEVIATIONS NOTED, PT EVAL
decreased ever/increased time in double stance/decreased velocity of limb motion/decreased step length/decreased stride length/decreased weight-shifting ability

## 2023-02-28 NOTE — H&P ADULT - PROBLEM SELECTOR PLAN 4
UA showed moderate blood, RBC 5-10, few bacteria, and protein 15.  Asymptomatic   Hematuria may be related to acute trauma. Consider repeating UA w/ microscopy at least 6 weeks later. If persistent, consider glomerular bleeding and further workup.   F/U Urine Cx (sent by ED)  No indications for any antibiotic at this time   Will monitor for now

## 2023-02-28 NOTE — PHYSICAL THERAPY INITIAL EVALUATION ADULT - ACTIVE RANGE OF MOTION EXAMINATION, REHAB EVAL
Limited assessment secondary to impaired ability to follow commands: except Rt shoulder ~1/2 range, Lt shoulder ~1/3 range flx/bilateral upper extremity Active ROM was WFL (within functional limits)/bilateral  lower extremity Active ROM was WFL (within functional limits)

## 2023-02-28 NOTE — H&P ADULT - ASSESSMENT
A 89 year old female, from home, ambulating w/ a walker at baseline, w/ PMHx of Atrial Flutter (on Apixaban), Epilepsy, Anxiety, and Urinary incontinence, was brought into the ED by her family due to a fall. CT Head showed no intracranial hemorrhage and CT cervical spine did not show any fracture. EKG showed Atrial Flutter, similar to the previous one in Jan 2023. Hb and kidney function WNL. Likely mechanical falls due to unbalance problems. Less likely cardiac related. Admitted to medicine for further evaluation and management.

## 2023-02-28 NOTE — H&P ADULT - NSICDXPASTMEDICALHX_GEN_ALL_CORE_FT
PAST MEDICAL HISTORY:  Anxiety     Atrial flutter     Epilepsy     GERD (gastroesophageal reflux disease)     HTN (hypertension)     Urinary incontinence

## 2023-02-28 NOTE — ED ADULT NURSE NOTE - OBJECTIVE STATEMENT
pt is a 89 y.o. female c/o fall. pt daughter reports pt was walking around the house and fell. pt is ax4, on room air. pt lives with daughter & daughter states  she cannot take care of her, and needs her to go to rehab. pts daughter reports her falling 4x recently. pt is on blood thinners. pt has a small laceration above right eyebrow.

## 2023-02-28 NOTE — PHYSICAL THERAPY INITIAL EVALUATION ADULT - ORIENTATION, REHAB EVAL
pt did not answer when asked her name, she is quite verbal and did staste she was supposed to come to the hospital but was unaware that she is there now/unable to assess

## 2023-02-28 NOTE — ED PROVIDER NOTE - CLINICAL SUMMARY MEDICAL DECISION MAKING FREE TEXT BOX
Patient with multiple falls as per daughter presents now with forehead laceration.  Daughter states she is no longer able to attend to her mother due to multiple falls.  No traumatic brain injury or cervical fracture reported on CAT scans.  Forehead laceration was approximated with tissue adhesive.  Medical admitting resident and Dr. PAMELA Ureña endorsed patient admitted for fall precautions and possible placement to skilled nursing facility/rehab.

## 2023-02-28 NOTE — ED PROVIDER NOTE - CARE PLAN
1 Principal Discharge DX:	Forehead laceration  Secondary Diagnosis:	Head injury  Secondary Diagnosis:	Multiple falls

## 2023-02-28 NOTE — ED PROVIDER NOTE - PHYSICAL EXAMINATION
General General GCS 15, no raccoon eyes, no Battles sign, no scalp step off deformities.   No cervical, thoracic or lumbosacral midline bony deformities,  +rotation and flexion-extension of neck and truncal area intact.   Right forehead 1.5cm laceration.

## 2023-02-28 NOTE — H&P ADULT - PROBLEM SELECTOR PLAN 2
EKG showed Atrial Flutter w/ variable AV block. Similar to the one in Jan 2023.   Will continue Apixaban 2.5 mg PO BID  Will continue Metoprolol 25 mg PO qd

## 2023-02-28 NOTE — PHYSICAL THERAPY INITIAL EVALUATION ADULT - LIVES WITH, PROFILE
pt is very limited historian, In PT note in Jan. 23 pt. is reported to live with daughter in apartment.

## 2023-02-28 NOTE — PATIENT PROFILE ADULT - FALL HARM RISK - HARM RISK INTERVENTIONS
Assistance with ambulation/Assistance OOB with selected safe patient handling equipment/Communicate Risk of Fall with Harm to all staff/Discuss with provider need for PT consult/Monitor gait and stability/Provide patient with walking aids - walker, cane, crutches/Reinforce activity limits and safety measures with patient and family/Sit up slowly, dangle for a short time, stand at bedside before walking/Tailored Fall Risk Interventions/Visual Cue: Yellow wristband and red socks/Bed in lowest position, wheels locked, appropriate side rails in place/Call bell, personal items and telephone in reach/Instruct patient to call for assistance before getting out of bed or chair/Non-slip footwear when patient is out of bed/[place] to call system/Physically safe environment - no spills, clutter or unnecessary equipment/Purposeful Proactive Rounding/Room/bathroom lighting operational, light cord in reach

## 2023-02-28 NOTE — H&P ADULT - ATTENDING COMMENTS
discussed management plan with house staff  pt was evaluated by writer earlier during the day   fall precautions/ PT eval

## 2023-02-28 NOTE — H&P ADULT - PROBLEM SELECTOR PLAN 5
CT cervical spine showed thyroid nodules including a peripherally calcified 1.6 cm right lobe nodule  F/U TSH   Consider further management such as Thyroid US as outpatient   Will monitor for now CT cervical spine showed thyroid nodules including a peripherally calcified 1.6 cm right lobe nodule  TSH 2.64  Consider further management such as Thyroid US as outpatient   Will monitor for now

## 2023-02-28 NOTE — H&P ADULT - PROBLEM SELECTOR PLAN 1
CT Head showed no intracranial hemorrhage and CT cervical spine did not show any fracture. EKG showed Atrial Flutter.  Likely mechanical falls due to unbalance problems  F/U PT recommendations.   F/U SW. Daughter unable to take care of patient.   Fall precautions   Bed rest for now  Rest of the management as per primary team CT Head showed no intracranial hemorrhage and CT cervical spine did not show any fracture. EKG showed Atrial Flutter.  Likely mechanical falls due to unbalance problems  F/U PT recommendations.   F/U SW. Daughter unable to take care of patient.   F/U CPK  Neuro checks q4hr   Telemetry   Fall precautions   Bed rest for now  Rest of the management as per primary team

## 2023-02-28 NOTE — ED PROVIDER NOTE - RESPIRATORY NEGATIVE STATEMENT, MLM
Take the steroid, ibuprofen as needed for pain  Tessalon Perles for the cough        no chest pain, no cough, and no shortness of breath.

## 2023-02-28 NOTE — PHYSICAL THERAPY INITIAL EVALUATION ADULT - PASSIVE RANGE OF MOTION EXAMINATION, REHAB EVAL
except Rt shoulder ~3/4 range, Lt shoulder ~90 deg flx/bilateral upper extremity Passive ROM was WFL (within functional limits)/bilateral lower extremity Passive ROM was WFL (within functional limits)

## 2023-02-28 NOTE — PHYSICAL THERAPY INITIAL EVALUATION ADULT - ASR WT BEARING STATUS EVAL
WBAT has chronic deformities, hx subluxation at shoulder on prior admit not noted on chest x-ray. Consider additional imaging if clinically indicated/Right UE

## 2023-02-28 NOTE — H&P ADULT - NSHPSOCIALHISTORY_GEN_ALL_CORE
Patient lives at home w/ her daughter. Ambulates w/ a walker. Does not smoke, drink, or use any illicit drugs.

## 2023-02-28 NOTE — H&P ADULT - NSICDXFAMILYHX_GEN_ALL_CORE_FT
FAMILY HISTORY:  Father  Still living? Unknown  Family history unknown, Age at diagnosis: Age Unknown    Mother  Still living? Unknown  Family history unknown, Age at diagnosis: Age Unknown    Sibling  Still living? Unknown  Family history unknown, Age at diagnosis: Age Unknown

## 2023-02-28 NOTE — ED ADULT NURSE NOTE - ED STAT RN HANDOFF DETAILS 2
patient transferred to [facility] pending bed assignment. A&OX1, confused to situation. denies pain/discomfort. IV intact no redness no swelling noted. Endorsed to MAREN Arana

## 2023-02-28 NOTE — PHYSICAL THERAPY INITIAL EVALUATION ADULT - PLANNED THERAPY INTERVENTIONS, PT EVAL
balance training/bed mobility training/gait training/neuromuscular re-education/postural re-education/ROM/strengthening/stretching/transfer training/wheelchair management/propulsion training

## 2023-02-28 NOTE — ED ADULT NURSE REASSESSMENT NOTE - NS ED NURSE REASSESS COMMENT FT1
pt has small laceration on right eyebrow, and small nodule above right eyebrow, this nurse cleaned up pts face w/ saline and gauze. no other concerns noted.

## 2023-02-28 NOTE — PHARMACOTHERAPY INTERVENTION NOTE - COMMENTS
Patient's pharmacy of record ([facility] at [address] [place] [address] [phone]) was contacted, and the outside medication list was updated based on their prescription history.

## 2023-02-28 NOTE — PHYSICAL THERAPY INITIAL EVALUATION ADULT - DIAGNOSIS, PT EVAL
(ICF Model) Pt. present w/deficits in Body Structures/Function (Impairments), incl: Strength, Balance, ROM, pain, cognition and skin integ. leading to deficits in performing the below noted Activities (Limitations).

## 2023-02-28 NOTE — H&P ADULT - HISTORY OF PRESENT ILLNESS
A 89 year old female, from home, ambulating w/ a walker at baseline, w/ PMHx of Atrial Flutter (on Apixaban), Epilepsy, Anxiety, and Urinary incontinence, was brought into the ED by her family due to a fall around 2 am on 2/28/23. As  per patient, she "got up from bed to reach something at the night stand table and lost her balance" resulting in a fall and hitting the night stand table with her head. Denies LOC, nausea, vomiting, urinary and/or stool incontinence, dyspnea, chest pain, palpitations, headache, blurry vision, dizziness, or lightheadedness. As per daughter Waleska Peña ([phone]), patient has been having multiple falls for the last several years, but they have become more frequent during the past year. Patient has had at least 4 falls since Jan 2023. Her daughter has noticed progressive lower extremity weakness and unbalance as well as intermittent memory impairment. Daughter requests rehab since she is unable to take care of the patient at the moment due to medical issues going on.

## 2023-02-28 NOTE — ED ADULT TRIAGE NOTE - CHIEF COMPLAINT QUOTE
As per daughter pt usually uses a walker, but she got up without using the walker and she lost her balance and fell, hitting right side of her head on the table, Pt c/o feeling dizzy ' like the room was spinning when she got up. Pt on Eliquis. Abrasion noted to right side near eyebrow and laceration to left lower leg.

## 2023-02-28 NOTE — PHYSICAL THERAPY INITIAL EVALUATION ADULT - GENERAL OBSERVATIONS, REHAB EVAL
Consult received, chart reviewed. Patient received supine in bed, NAD. + tele. Kyphotic posture. Patient agreed to EVALUATION from Physical Therapist.

## 2023-02-28 NOTE — PHYSICAL THERAPY INITIAL EVALUATION ADULT - MANUAL MUSCLE TESTING RESULTS, REHAB EVAL
Limited assessment secondary to impaired ability to follow commands: BLE at least 3-/5, b/l shoulders at least 2+/5, elbows and distal at least 3+/5

## 2023-02-28 NOTE — H&P ADULT - PROBLEM SELECTOR PLAN 3
As per HYVET trial,  achieve a target blood pressure of 150/80 mm Hg.   Will continue Amlodipine 2.5 mg w/ parameters  DASH diet  Monitor BP and adjust medication as needed.

## 2023-02-28 NOTE — ED PROVIDER NOTE - OBJECTIVE STATEMENT
89-year-old female accompanied with daughter who states patient is having multiple falls in the past month.  Patient had fall at 2:30 AM struck right forehead on the nightstand.  Patient sustained 1.5 cm laceration to area.  No LOC reported.  Patient is on Eliquis for history of a flutter.  On evaluation patient is alert and conversant denies any neck or hip tenderness.

## 2023-02-28 NOTE — H&P ADULT - NSHPPHYSICALEXAM_GEN_ALL_CORE
PHYSICAL EXAMINATION:  GENERAL: NAD  HEAD:  Atraumatic, Normocephalic  EYES:  Conjunctiva and sclera clear, pupils are equal, round, and reactive to light and accommodation.  NECK: Supple, No JVD, trachea is midline, no evidence of thyroid enlargement, no lymphadenopathy or tenderness.  CHEST/LUNG: Clear to auscultation; No rales, rhonchi, wheezing, or rubs  HEART: Regular rate and rhythm; No murmurs, rubs, or gallops  ABDOMEN: Soft, Nontender, Nondistended; Bowel sounds present  NERVOUS SYSTEM:  Alert & Oriented X3; No focal sensory or motor deficits are noted; Recent and remote memory is intact; Appropriate mood and affect.  EXTREMITIES:  2+ Peripheral Pulses, No clubbing, cyanosis, or edema  SKIN: Warm, dry, and well perfused; Good turgor; Stasis dermatitis like rash on the lower extremity bilaterally

## 2023-03-01 LAB
A1C WITH ESTIMATED AVERAGE GLUCOSE RESULT: 5.8 % — HIGH (ref 4–5.6)
ALBUMIN SERPL ELPH-MCNC: 3.3 G/DL — LOW (ref 3.5–5)
ALP SERPL-CCNC: 111 U/L — SIGNIFICANT CHANGE UP (ref 40–120)
ALT FLD-CCNC: 17 U/L DA — SIGNIFICANT CHANGE UP (ref 10–60)
ANION GAP SERPL CALC-SCNC: 5 MMOL/L — SIGNIFICANT CHANGE UP (ref 5–17)
APTT BLD: 31.1 SEC — SIGNIFICANT CHANGE UP (ref 27.5–35.5)
AST SERPL-CCNC: 15 U/L — SIGNIFICANT CHANGE UP (ref 10–40)
BASOPHILS # BLD AUTO: 0.03 K/UL — SIGNIFICANT CHANGE UP (ref 0–0.2)
BASOPHILS NFR BLD AUTO: 0.5 % — SIGNIFICANT CHANGE UP (ref 0–2)
BILIRUB SERPL-MCNC: 0.8 MG/DL — SIGNIFICANT CHANGE UP (ref 0.2–1.2)
BUN SERPL-MCNC: 15 MG/DL — SIGNIFICANT CHANGE UP (ref 7–18)
CALCIUM SERPL-MCNC: 9 MG/DL — SIGNIFICANT CHANGE UP (ref 8.4–10.5)
CHLORIDE SERPL-SCNC: 107 MMOL/L — SIGNIFICANT CHANGE UP (ref 96–108)
CO2 SERPL-SCNC: 27 MMOL/L — SIGNIFICANT CHANGE UP (ref 22–31)
CREAT SERPL-MCNC: 0.76 MG/DL — SIGNIFICANT CHANGE UP (ref 0.5–1.3)
CULTURE RESULTS: SIGNIFICANT CHANGE UP
EGFR: 73 ML/MIN/1.73M2 — SIGNIFICANT CHANGE UP
EOSINOPHIL # BLD AUTO: 0.17 K/UL — SIGNIFICANT CHANGE UP (ref 0–0.5)
EOSINOPHIL NFR BLD AUTO: 2.7 % — SIGNIFICANT CHANGE UP (ref 0–6)
ESTIMATED AVERAGE GLUCOSE: 120 MG/DL — HIGH (ref 68–114)
GLUCOSE SERPL-MCNC: 87 MG/DL — SIGNIFICANT CHANGE UP (ref 70–99)
HCT VFR BLD CALC: 42.5 % — SIGNIFICANT CHANGE UP (ref 34.5–45)
HGB BLD-MCNC: 13.3 G/DL — SIGNIFICANT CHANGE UP (ref 11.5–15.5)
IMM GRANULOCYTES NFR BLD AUTO: 0.3 % — SIGNIFICANT CHANGE UP (ref 0–0.9)
INR BLD: 1.17 RATIO — HIGH (ref 0.88–1.16)
LYMPHOCYTES # BLD AUTO: 1.22 K/UL — SIGNIFICANT CHANGE UP (ref 1–3.3)
LYMPHOCYTES # BLD AUTO: 19.1 % — SIGNIFICANT CHANGE UP (ref 13–44)
MAGNESIUM SERPL-MCNC: 2.4 MG/DL — SIGNIFICANT CHANGE UP (ref 1.6–2.6)
MCHC RBC-ENTMCNC: 25.9 PG — LOW (ref 27–34)
MCHC RBC-ENTMCNC: 31.3 GM/DL — LOW (ref 32–36)
MCV RBC AUTO: 82.7 FL — SIGNIFICANT CHANGE UP (ref 80–100)
MONOCYTES # BLD AUTO: 0.44 K/UL — SIGNIFICANT CHANGE UP (ref 0–0.9)
MONOCYTES NFR BLD AUTO: 6.9 % — SIGNIFICANT CHANGE UP (ref 2–14)
NEUTROPHILS # BLD AUTO: 4.52 K/UL — SIGNIFICANT CHANGE UP (ref 1.8–7.4)
NEUTROPHILS NFR BLD AUTO: 70.5 % — SIGNIFICANT CHANGE UP (ref 43–77)
NRBC # BLD: 0 /100 WBCS — SIGNIFICANT CHANGE UP (ref 0–0)
PHOSPHATE SERPL-MCNC: 3.7 MG/DL — SIGNIFICANT CHANGE UP (ref 2.5–4.5)
PLATELET # BLD AUTO: 154 K/UL — SIGNIFICANT CHANGE UP (ref 150–400)
POTASSIUM SERPL-MCNC: 4.8 MMOL/L — SIGNIFICANT CHANGE UP (ref 3.5–5.3)
POTASSIUM SERPL-SCNC: 4.8 MMOL/L — SIGNIFICANT CHANGE UP (ref 3.5–5.3)
PROT SERPL-MCNC: 6.5 G/DL — SIGNIFICANT CHANGE UP (ref 6–8.3)
PROTHROM AB SERPL-ACNC: 14 SEC — HIGH (ref 10.5–13.4)
RBC # BLD: 5.14 M/UL — SIGNIFICANT CHANGE UP (ref 3.8–5.2)
RBC # FLD: 14.6 % — HIGH (ref 10.3–14.5)
SODIUM SERPL-SCNC: 139 MMOL/L — SIGNIFICANT CHANGE UP (ref 135–145)
SPECIMEN SOURCE: SIGNIFICANT CHANGE UP
WBC # BLD: 6.4 K/UL — SIGNIFICANT CHANGE UP (ref 3.8–10.5)
WBC # FLD AUTO: 6.4 K/UL — SIGNIFICANT CHANGE UP (ref 3.8–10.5)

## 2023-03-01 RX ADMIN — Medication 25 MILLIGRAM(S): at 06:16

## 2023-03-01 RX ADMIN — Medication 5 MILLIGRAM(S): at 12:35

## 2023-03-01 RX ADMIN — APIXABAN 2.5 MILLIGRAM(S): 2.5 TABLET, FILM COATED ORAL at 17:34

## 2023-03-01 RX ADMIN — AMLODIPINE BESYLATE 2.5 MILLIGRAM(S): 2.5 TABLET ORAL at 06:16

## 2023-03-01 RX ADMIN — PANTOPRAZOLE SODIUM 40 MILLIGRAM(S): 20 TABLET, DELAYED RELEASE ORAL at 06:15

## 2023-03-01 RX ADMIN — Medication 30 MILLILITER(S): at 15:05

## 2023-03-01 RX ADMIN — APIXABAN 2.5 MILLIGRAM(S): 2.5 TABLET, FILM COATED ORAL at 06:15

## 2023-03-01 RX ADMIN — LACOSAMIDE 100 MILLIGRAM(S): 50 TABLET ORAL at 06:16

## 2023-03-01 RX ADMIN — SERTRALINE 100 MILLIGRAM(S): 25 TABLET, FILM COATED ORAL at 12:35

## 2023-03-01 RX ADMIN — LACOSAMIDE 100 MILLIGRAM(S): 50 TABLET ORAL at 17:34

## 2023-03-01 NOTE — PROGRESS NOTE ADULT - PROBLEM SELECTOR PLAN 5
CT cervical spine showed thyroid nodules including a peripherally calcified 1.6 cm right lobe nodule  TSH 2.64  Thyroid US as outpatient

## 2023-03-01 NOTE — DISCHARGE NOTE PROVIDER - HOSPITAL COURSE
89 year old female, from home, ambulating w/ a walker at baseline, w/ PMHx of Atrial Flutter (on Apixaban), Epilepsy, Anxiety, and Urinary incontinence, was brought into the ED by her family due to a fall. noted rt forehead laceration on exam. CT Head showed no intracranial hemorrhage and CT cervical spine did not show any fracture. Thyroid nodule noted on CT scan, pt to f/u outpt for thyroid US. TSH 2.64. EKG showed Atrial Flutter, similar to the previous one in Jan 2023.  Admitted to tele for further evaluation and management. PT seen by PT who recc EDUARDA, choices given SW following.       Please note that this a brief summary of hospital course please refer to daily progress notes and consult notes for full course and events  Incomplete 3/1/23---->   89 year old Kyrgyz speaking female, from home, ambulating w/ a walker at baseline, w/ PMHx of Atrial Flutter (on Apixaban), Epilepsy, Anxiety, and Urinary incontinence, was brought into the ED by her family due to a fall. CT Head showed no intracranial hemorrhage and CT cervical spine did not show any fracture. EKG showed Atrial Flutter, similar to the previous one in Jan 2023.  Admitted to medicine for ambulatory dysfunction. Pt noted for right hip ecchymosis. pending xray of pelvis. PT rec EDUARDA, awaiting acceptance.    89 year old [place] speaking female, from home, ambulating w/ a walker at baseline, w/ PMHx of Atrial Flutter (on Apixaban), Epilepsy, Anxiety, and Urinary incontinence, was brought into the ED by her family due to a fall. CT Head showed no intracranial hemorrhage and CT cervical spine did not show any fracture. EKG showed Atrial Flutter, similar to the previous one in Jan 2023.  Admitted to medicine for ambulatory dysfunction. Pt noted for right hip ecchymosis. PT rec EDUARDA, awaiting acceptance.       pending xray of pelvis. 89 year old Georgian speaking female, from home, ambulating w/ a walker at baseline, w/ PMHx of Atrial Flutter (on Apixaban), Epilepsy, Anxiety, and Urinary incontinence, was brought into the ED by her family due to a fall. CT Head showed no intracranial hemorrhage and CT cervical spine did not show any fracture. EKG showed Atrial Flutter, similar to the previous one in Jan 2023.  Admitted to medicine for ambulatory dysfunction. Pt noted for right hip ecchymosis. PT rec EDUARDA, awaiting acceptance.       THIS IS A BRIEF SUMMARY OF EVENTS.  FOR A FULL HOSPITAL COURSE SEE MEDICAL RECORDS OR [employer] PORTAL

## 2023-03-01 NOTE — PROGRESS NOTE ADULT - PROBLEM SELECTOR PLAN 1
CT Head showed no intracranial hemorrhage and CT cervical spine did not show any fracture.   EKG showed Atrial Flutter.  Likely mechanical falls due to balance problems  PT recommendation: EDUARDA  c/w Neuro checks   c/w Telemetry   Fall precautions

## 2023-03-01 NOTE — PROGRESS NOTE ADULT - PROBLEM SELECTOR PLAN 4
UA noted  Asymptomatic   F/U Urine Cx (sent by ED)  No indications for any antibiotic   Will monitor

## 2023-03-01 NOTE — DISCHARGE NOTE PROVIDER - NSDCCPCAREPLAN_GEN_ALL_CORE_FT
PRINCIPAL DISCHARGE DIAGNOSIS  Diagnosis: Forehead laceration  Assessment and Plan of Treatment: You presented to the ED after a fall at home. you were noted to have a forehead laceration. you had a ct scan of your head and cervical spine. no acute abnormalities were noted on your scans. A laceration happens when the skin and other tissues are torn.   Have someone call your local emergency number (911 in the [place]) if:  You cannot be woken.  Your mood or behavior changes.  Call your doctor if:  The area is red, warm, or has pus coming from   The area begins to bleed and does not stop after 15 minutes of pressure.  Rest. Some activities may cause too much pressure in your head. Your laceration may begin to bleed.  Ice the area. Apply ice to the area for 15 to 20 minutes every hour or as directed. Use an ice pack, or put crushed ice in a plastic bag. Cover it with a towel before you apply  Ice helps prevent tissue damage and decreases swelling and pain.  Keep the area clean and dry. Check the area every day for signs of infection. Signs of infection may include redness, pus, and warmth around the area. Follow up with your doctor      SECONDARY DISCHARGE DIAGNOSES  Diagnosis: Multiple falls  Assessment and Plan of Treatment: As you age, your muscles weaken and your risk for falls increases. Your risk also increases if you take medicines that make you sleepy or dizzy. You may also be at risk if you have vision or joint problems.  How can I help protect myself from falls? Stay active. Exercise can help strengthen your muscles and improve your balance. Wear shoes that fit well and have soles that . Use assistive devices as directed. Stand or sit up slowly. This may help you keep your balance and prevent falls.  Manage your medical conditions. Keep all appointments with your healthcare providers. Visit your eye doctor as directed.  How can I make my home safer?  Keep paths clear. Remove books, shoes, and other objects from walkways and stairs. Place cords for telephones and lamps. Install bright lights in your home.   Arrange to have someone call your local emergency number (911 in the [place]) if:  You have fallen and are found unconscious.  You have fallen and cannot move part of your body.    Diagnosis: Thyroid nodule  Assessment and Plan of Treatment:     Diagnosis: HTN (hypertension)  Assessment and Plan of Treatment: You have a history of high blood pressure. High blood pressure is a condition that puts you at risk for heart attack, stroke and kidney disease. Please continue to take your medications as prescribed. You can also help control your blood pressure by maintaining a healthy weight, eating a diet low in fat and rich in fruits and vegetables, reduce the amount of salt in your diet. Also, reduce alcohol and try to include some form of physical activity daily for at least 30 mins. Follow up with your medical doctor to establish long term blood pressure treatment goals.  Notify your doctor if you have any of the following symptoms:   Dizziness, Lightheadedness, Blurry vision, Headache, Chest pain, Shortness of breath      Diagnosis: Epilepsy  Assessment and Plan of Treatment:     Diagnosis: Atrial flutter  Assessment and Plan of Treatment: Atrial fibrillation is the most common heart rhythm problem & has the risk of stroke & heart attack  It helps if you control your blood pressure, not drink more than 1-2 alcohol drinks per day, cut down on caffeine, getting treatment for over active thyroid gland, & getting exercise  Call your doctor if you feel your heart racing or beating unusually, chest tightness or pain, lightheaded, faint, shortness of breath especially with exercise  It is important to take your heart medication as prescribed  You may be on anticoagulation which is very important to take as directed - you may need blood work to monitor drug levels       PRINCIPAL DISCHARGE DIAGNOSIS  Diagnosis: Stenosis, cervical spine  Assessment and Plan of Treatment: you came into hospital due to recent multiple falls. you had a CT scan of your neck that showed mild cervical stenosis. Cervical spinal stenosis is narrowing of the spinal canal in your neck. Your spinal canal holds your spinal cord. When your spinal canal narrows, it may put pressure on your spinal cord. Cervical spinal stenosis is a chronic (long-term) condition.  It can cause problems with walking and balance  you will need daily physical therapy at a rehab center.      SECONDARY DISCHARGE DIAGNOSES  Diagnosis: Degenerative disc disease, cervical  Assessment and Plan of Treatment: your CT scan of your neck also showed degenerative disc disease. Degenerative disc disease happens when one or more discs between the vertebrae (bones in your spine) wear down. Discs act like a cushion between your vertebrae and help to stabilize your spine. Degenerative disc disease commonly occurs in the neck or lower back as you get older. This can also contribute to your weakness in your legs. This is a chronic condition and there is no cure for this.    Diagnosis: Multiple falls  Assessment and Plan of Treatment: You presented to the ED after a fall at home. you were noted to have a forehead laceration. you had a ct scan of your head and cervical spine, no fracture was found.  one of your medications could contribute to you falling - sertraline 100 mg daily  follow up with your primary care doctor if you really need to keep taking it.    Diagnosis: HTN (hypertension)  Assessment and Plan of Treatment: your blood pressure ranged between:  continue taking amlodipine 2.5 mg daily  follow up with your primary care doctor or cardiologist.  try to take your blood pressure readings twice a day, morning and night time.   Notify your doctor if you have any of the following symptoms:   Dizziness, Lightheadedness, Blurry vision, Headache, Chest pain, Shortness of breath      Diagnosis: Atrial flutter  Assessment and Plan of Treatment: continue taking eliquis 2.5 mg twice a day and metoprolol succinate 25 mg daily    Diagnosis: Thyroid nodule  Assessment and Plan of Treatment: on your CT scan of your neck it showed subsequent findings of thyroid nodules  follow up with your primary care doctor for an outpatient ultrasound of your thyroid to monitor the nodules.    Diagnosis: Epilepsy  Assessment and Plan of Treatment: continue taking your home medication vimpat 100 mg twice a day    Diagnosis: Anxiety  Assessment and Plan of Treatment: continue taking your home medication sertraline 100 mg daily    Diagnosis: Urinary incontinence  Assessment and Plan of Treatment: continue taking oxybutinin 5 mg daily     PRINCIPAL DISCHARGE DIAGNOSIS  Diagnosis: Stenosis, cervical spine  Assessment and Plan of Treatment: you came into hospital due to recent multiple falls. you had a CT scan of your neck that showed mild cervical stenosis. Cervical spinal stenosis is narrowing of the spinal canal in your neck. Your spinal canal holds your spinal cord. When your spinal canal narrows, it may put pressure on your spinal cord. Cervical spinal stenosis is a chronic (long-term) condition.  It can cause problems with walking and balance  you will need daily physical therapy at a rehab center.      SECONDARY DISCHARGE DIAGNOSES  Diagnosis: Degenerative disc disease, cervical  Assessment and Plan of Treatment: your CT scan of your neck also showed degenerative disc disease. Degenerative disc disease happens when one or more discs between the vertebrae (bones in your spine) wear down. Discs act like a cushion between your vertebrae and help to stabilize your spine. Degenerative disc disease commonly occurs in the neck or lower back as you get older. This can also contribute to your weakness in your legs. This is a chronic condition and there is no cure for this.    Diagnosis: Multiple falls  Assessment and Plan of Treatment: You presented to the ED after a fall at home. you were noted to have a forehead laceration. you had a ct scan of your head and cervical spine, no fracture was found.  one of your medications could contribute to you falling - sertraline 100 mg daily  follow up with your primary care doctor if you really need to keep taking it.    Diagnosis: HTN (hypertension)  Assessment and Plan of Treatment: your blood pressure ranged between: (95/61 - 124/68)  continue taking amlodipine 2.5 mg daily  follow up with your primary care doctor or cardiologist.  try to take your blood pressure readings twice a day, morning and night time.   Notify your doctor if you have any of the following symptoms:   Dizziness, Lightheadedness, Blurry vision, Headache, Chest pain, Shortness of breath      Diagnosis: Atrial flutter  Assessment and Plan of Treatment: continue taking eliquis 2.5 mg twice a day and metoprolol succinate 25 mg daily    Diagnosis: Thyroid nodule  Assessment and Plan of Treatment: on your CT scan of your neck it showed subsequent findings of thyroid nodules  follow up with your primary care doctor for an outpatient ultrasound of your thyroid to monitor the nodules.    Diagnosis: Epilepsy  Assessment and Plan of Treatment: continue taking your home medication vimpat 100 mg twice a day    Diagnosis: Anxiety  Assessment and Plan of Treatment: continue taking your home medication sertraline 100 mg daily    Diagnosis: Urinary incontinence  Assessment and Plan of Treatment: continue taking oxybutinin 5 mg daily     PRINCIPAL DISCHARGE DIAGNOSIS  Diagnosis: Stenosis, cervical spine  Assessment and Plan of Treatment: you came into hospital due to recent multiple falls. you had a CT scan of your neck that showed mild cervical stenosis. Cervical spinal stenosis is narrowing of the spinal canal in your neck. Your spinal canal holds your spinal cord. When your spinal canal narrows, it may put pressure on your spinal cord. Cervical spinal stenosis is a chronic (long-term) condition.  It can cause problems with walking and balance  you will need daily physical therapy at a rehab center.      SECONDARY DISCHARGE DIAGNOSES  Diagnosis: Multiple falls  Assessment and Plan of Treatment: You presented to the ED after a fall at home. you were noted to have a forehead laceration. you had a ct scan of your head and cervical spine, no fracture was found.  one of your medications could contribute to you falling - sertraline 100 mg daily  follow up with your primary care doctor if you really need to keep taking it.    Diagnosis: HTN (hypertension)  Assessment and Plan of Treatment: you have a history of high blood pressure.  your blood pressure was monitored and remained stable.  please continue taking amlodipine 2.5 mg daily  follow up with your primary care doctor or cardiologist.  try to take your blood pressure readings twice a day, morning and night time.   Notify your doctor if you have any of the following symptoms:   Dizziness, Lightheadedness, Blurry vision, Headache, Chest pain, Shortness of breath      Diagnosis: Atrial flutter  Assessment and Plan of Treatment: continue taking eliquis 2.5 mg twice a day and metoprolol succinate 25 mg daily    Diagnosis: Thyroid nodule  Assessment and Plan of Treatment: on your CT scan of your neck it showed subsequent findings of thyroid nodules  follow up with your primary care doctor for an outpatient ultrasound of your thyroid to monitor the nodules.    Diagnosis: Epilepsy  Assessment and Plan of Treatment: continue taking your home medication vimpat 100 mg twice a day    Diagnosis: Anxiety  Assessment and Plan of Treatment: continue taking your home medication sertraline 100 mg daily    Diagnosis: Urinary incontinence  Assessment and Plan of Treatment: continue taking oxybutinin 5 mg daily    Diagnosis: Degenerative disc disease, cervical  Assessment and Plan of Treatment: your CT scan of your neck also showed degenerative disc disease. Degenerative disc disease happens when one or more discs between the vertebrae (bones in your spine) wear down. Discs act like a cushion between your vertebrae and help to stabilize your spine. Degenerative disc disease commonly occurs in the neck or lower back as you get older. This can also contribute to your weakness in your legs. This is a chronic condition and there is no cure for this.

## 2023-03-01 NOTE — PROGRESS NOTE ADULT - SUBJECTIVE AND OBJECTIVE BOX
Patient is a 89y old  Female who presents with a chief complaint of Multiple falls (2023 08:28)      INTERVAL HPI/OVERNIGHT EVENTS: no overnight events    I&O's Summary    Vital Signs Last 24 Hrs  T(C): 36.9 (01 Mar 2023 10:00), Max: 36.9 (01 Mar 2023 10:00)  T(F): 98.5 (01 Mar 2023 10:00), Max: 98.5 (01 Mar 2023 10:00)  HR: 82 (01 Mar 2023 05:58) (72 - 100)  BP: 95/52 (01 Mar 2023 10:00) (95/52 - 144/77)  BP(mean): --  RR: 16 (01 Mar 2023 10:00) (16 - 18)  SpO2: 96% (01 Mar 2023 10:00) (95% - 98%)    Parameters below as of 01 Mar 2023 10:00  Patient On (Oxygen Delivery Method): room air      PAST MEDICAL & SURGICAL HISTORY:  HTN (hypertension)      Epilepsy      Atrial flutter      Urinary incontinence      Anxiety      GERD (gastroesophageal reflux disease)      No significant past surgical history          SOCIAL HISTORY  Alcohol:  Tobacco:  Illicit substance use:      FAMILY HISTORY:      LABS:                        13.3   6.40  )-----------( 154      ( 01 Mar 2023 06:04 )             42.5     03-01    139  |  107  |  15  ----------------------------<  87  4.8   |  27  |  0.76    Ca    9.0      01 Mar 2023 06:04  Phos  3.7     03-01  Mg     2.4     03-01    TPro  6.5  /  Alb  3.3<L>  /  TBili  0.8  /  DBili  x   /  AST  15  /  ALT  17  /  AlkPhos  111  03-01    PT/INR - ( 01 Mar 2023 06:04 )   PT: 14.0 sec;   INR: 1.17 ratio         PTT - ( 01 Mar 2023 06:04 )  PTT:31.1 sec  Urinalysis Basic - ( 2023 08:00 )    Color: Yellow / Appearance: Clear / S.010 / pH: x  Gluc: x / Ketone: Negative  / Bili: Negative / Urobili: Negative   Blood: x / Protein: 15 / Nitrite: Negative   Leuk Esterase: Negative / RBC: 5-10 /HPF / WBC 0-2 /HPF   Sq Epi: x / Non Sq Epi: Few /HPF / Bacteria: Few /HPF      CAPILLARY BLOOD GLUCOSE            Urinalysis Basic - ( 2023 08:00 )    Color: Yellow / Appearance: Clear / S.010 / pH: x  Gluc: x / Ketone: Negative  / Bili: Negative / Urobili: Negative   Blood: x / Protein: 15 / Nitrite: Negative   Leuk Esterase: Negative / RBC: 5-10 /HPF / WBC 0-2 /HPF   Sq Epi: x / Non Sq Epi: Few /HPF / Bacteria: Few /HPF        MEDICATIONS  (STANDING):  amLODIPine   Tablet 2.5 milliGRAM(s) Oral daily  apixaban 2.5 milliGRAM(s) Oral every 12 hours  lacosamide 100 milliGRAM(s) Oral two times a day  metoprolol succinate ER 25 milliGRAM(s) Oral daily  oxybutynin XL 5 milliGRAM(s) Oral daily  pantoprazole    Tablet 40 milliGRAM(s) Oral before breakfast  sertraline 100 milliGRAM(s) Oral daily    MEDICATIONS  (PRN):      REVIEW OF SYSTEMS: Limited   EYES: No eye pain, visual disturbances  ENMT:  No throat pain  NECK: No pain   CARDIOVASCULAR: No chest pain  GASTROINTESTINAL: No abdominal pain.  GENITOURINARY: No dysuria  NEUROLOGICAL: No headaches  MUSCULOSKELETAL: No joint pain    RADIOLOGY & ADDITIONAL TESTS:  ACC: [id number] EXAM:  CT CERVICAL SPINE   ORDERED BY: ROSIO BLANCHARD     ACC: [id number] EXAM:  CT BRAIN   ORDERED BY: ROSIO BLANCHARD     PROCEDURE DATE:  2023          INTERPRETATION:  CLINICAL INFORMATION: Fall. On blood thinners. Dizzy.   Hit right side of forehead.    TECHNIQUE:  Axial CT images were acquired through the head and cervical spine.  Intravenous contrast: None  Sagittal and coronal reformats were generated.  3-D reformats of the cervical spine.    COMPARISON STUDY: None    FINDINGS:  CT head:  There is no mass effect, intracranial hemorrhage, or midline shift.    There is no CT evidence of acute territorial infarct.    The ventricles and sulci are appropriate in size and configuration for   patient's stated age.    The imaged portions of the paranasal sinuses and mastoids are well   aerated.    9 mm nodule in the soft tissues of the right posterior parietal region   (3:39) may represent a small hematoma in the setting of right-sided head   trauma versus a chronic finding such as a small sebaceous cyst.    There is no osseous abnormality.  Bilateral cataract surgery.    CT cervical spine:  There is no evidence of fracture or subluxation.  The vertebral bodies   are normally aligned.  The prevertebral soft tissues are unremarkable.    There are multilevel degenerative changes characterized by disc   osteophyte complexes and facet and uncinate hypertrophy. This results in   mild canal stenosis and multilevel neural foraminal narrowing.    The visualized intracranial structures are unremarkable.  The soft   tissues of the neck are grossly normal.  The lung apices are clear.  Thyroid nodules including a peripherally calcified 1.6 cm right lobe   nodule.    IMPRESSION:    CT Head:  No intracranial hemorrhage or other acute finding    CT Cervical Spine: No acute fracture or subluxation    --- End of Report ---    NEELIMA IRBEIRO MD; Attending Radiologist  This document has been electronically signed. 2023  4:53AM    ACC: [id number] EXAM:  XR CHEST PORTABLE IMMED 1V   ORDERED BY: ROSIO BLANCHARD     PROCEDURE DATE:  2023          INTERPRETATION:  CLINICAL STATEMENT: Chest Pain.    TECHNIQUE: AP view of the chest.    COMPARISON: 2023    FINDINGS/  IMPRESSION:  Study limited due to rotation.    No consolidation or pleural effusion.    Chronic deformities shoulders again noted with nonspecific lucent lesion   at the right inferior glenoid.    Heart size cannot be accurately assessed in this projection.    --- End of Report ---    MILENA MENDOZA MD; Attending Radiologist  This document has been electronically signed. 2023  9:24AM    Imaging Personally Reviewed:  [x ] YES  [ ] NO    Consultant(s) Notes Reviewed:  [x ] YES  [ ] NO    PHYSICAL EXAM:  GENERAL: NA  HEAD: + right forehead laceration  EYES: PERRLA, conjunctiva and sclera clear  ENMT: Moist mucous membranes  NECK: Supple  NERVOUS SYSTEM:  Alert & Oriented X1  CHEST/LUNG: CTA bilaterally; No rales, rhonchi, wheezing  HEART: Regular rate   ABDOMEN: Soft, Nontender, Nondistended; Bowel sounds present  EXTREMITIES:  2+ Peripheral Pulses  SKIN: + bruising rt hip    Care Collaborated Discussed with Consultants/Other Providers [x ] YES  [ ] NO

## 2023-03-01 NOTE — DISCHARGE NOTE PROVIDER - NSDCMRMEDTOKEN_GEN_ALL_CORE_FT
AMLODIPINE 2.5MG TAB: 1 tab(s) orally once a day  apixaban 2.5 mg oral tablet: 1 tab(s) orally every 12 hours  Calcium 600+D oral tablet: 1 tab(s) orally 2 times a day  ESOMEPRA MAG 40MG DR CAP: 1 cap(s) orally once a day (in the morning)  METOPROL SUC 25MG ER TAB: 1 tab(s) orally once a day  OXYBUTYNIN 5MG TAB: 1 tab(s) orally 3 times a day  SERTRALINE 100MG TAB: 1 tab(s) orally once a day  Vimpat 100 mg oral tablet: 1 tab(s) orally 2 times a day   acetaminophen 325 mg oral tablet: 2 tab(s) orally every 6 hours, As needed, Temp greater or equal to 38C (100.4F), Mild Pain (1 - 3)  aluminum hydroxide-magnesium hydroxide 200 mg-200 mg/5 mL oral suspension: 30 milliliter(s) orally every 6 hours, As needed, Dyspepsia  amLODIPine 2.5 mg oral tablet: 1 tab(s) orally once a day  apixaban 2.5 mg oral tablet: 1 tab(s) orally every 12 hours  lacosamide 100 mg oral tablet: 1 tab(s) orally 2 times a day  lidocaine 4% topical film: Apply topically to affected area once a day  melatonin 3 mg oral tablet: 1 tab(s) orally once a day (at bedtime), As needed, Sleep  metoprolol succinate 25 mg oral tablet, extended release: 1 tab(s) orally once a day  oxybutynin 5 mg/24 hours oral tablet, extended release: 1 tab(s) orally once a day  pantoprazole 40 mg oral delayed release tablet: 1 tab(s) orally once a day (before a meal)  polyethylene glycol 3350 oral powder for reconstitution: 17 gram(s) orally once a day  senna leaf extract oral tablet: 2 tab(s) orally once a day (at bedtime)  sertraline 100 mg oral tablet: 1 tab(s) orally once a day

## 2023-03-01 NOTE — PROGRESS NOTE ADULT - SUBJECTIVE AND OBJECTIVE BOX
Patient is a 89y old  Female who presents with a chief complaint of Multiple falls (2023 08:28)      INTERVAL HPI/OVERNIGHT EVENTS: no overnight events    I&O's Summary    Vital Signs Last 24 Hrs  T(C): 36.9 (01 Mar 2023 10:00), Max: 36.9 (01 Mar 2023 10:00)  T(F): 98.5 (01 Mar 2023 10:00), Max: 98.5 (01 Mar 2023 10:00)  HR: 82 (01 Mar 2023 05:58) (72 - 100)  BP: 95/52 (01 Mar 2023 10:00) (95/52 - 144/77)  BP(mean): --  RR: 16 (01 Mar 2023 10:00) (16 - 18)  SpO2: 96% (01 Mar 2023 10:00) (95% - 98%)    Parameters below as of 01 Mar 2023 10:00  Patient On (Oxygen Delivery Method): room air      PAST MEDICAL & SURGICAL HISTORY:  HTN (hypertension)      Epilepsy      Atrial flutter      Urinary incontinence      Anxiety      GERD (gastroesophageal reflux disease)      No significant past surgical history          SOCIAL HISTORY  Alcohol:  Tobacco:  Illicit substance use:      FAMILY HISTORY:      LABS:                        13.3   6.40  )-----------( 154      ( 01 Mar 2023 06:04 )             42.5     03-01    139  |  107  |  15  ----------------------------<  87  4.8   |  27  |  0.76    Ca    9.0      01 Mar 2023 06:04  Phos  3.7     03-01  Mg     2.4     03-01    TPro  6.5  /  Alb  3.3<L>  /  TBili  0.8  /  DBili  x   /  AST  15  /  ALT  17  /  AlkPhos  111  03-01    PT/INR - ( 01 Mar 2023 06:04 )   PT: 14.0 sec;   INR: 1.17 ratio         PTT - ( 01 Mar 2023 06:04 )  PTT:31.1 sec  Urinalysis Basic - ( 2023 08:00 )    Color: Yellow / Appearance: Clear / S.010 / pH: x  Gluc: x / Ketone: Negative  / Bili: Negative / Urobili: Negative   Blood: x / Protein: 15 / Nitrite: Negative   Leuk Esterase: Negative / RBC: 5-10 /HPF / WBC 0-2 /HPF   Sq Epi: x / Non Sq Epi: Few /HPF / Bacteria: Few /HPF      CAPILLARY BLOOD GLUCOSE            Urinalysis Basic - ( 2023 08:00 )    Color: Yellow / Appearance: Clear / S.010 / pH: x  Gluc: x / Ketone: Negative  / Bili: Negative / Urobili: Negative   Blood: x / Protein: 15 / Nitrite: Negative   Leuk Esterase: Negative / RBC: 5-10 /HPF / WBC 0-2 /HPF   Sq Epi: x / Non Sq Epi: Few /HPF / Bacteria: Few /HPF        MEDICATIONS  (STANDING):  amLODIPine   Tablet 2.5 milliGRAM(s) Oral daily  apixaban 2.5 milliGRAM(s) Oral every 12 hours  lacosamide 100 milliGRAM(s) Oral two times a day  metoprolol succinate ER 25 milliGRAM(s) Oral daily  oxybutynin XL 5 milliGRAM(s) Oral daily  pantoprazole    Tablet 40 milliGRAM(s) Oral before breakfast  sertraline 100 milliGRAM(s) Oral daily    MEDICATIONS  (PRN):      REVIEW OF SYSTEMS: Limited   EYES: No eye pain, visual disturbances  ENMT:  No throat pain  NECK: No pain   CARDIOVASCULAR: No chest pain  GASTROINTESTINAL: No abdominal pain.  GENITOURINARY: No dysuria  NEUROLOGICAL: No headaches  MUSCULOSKELETAL: No joint pain    RADIOLOGY & ADDITIONAL TESTS:  ACC: [id number] EXAM:  CT CERVICAL SPINE   ORDERED BY: ROSIO BLANCHARD     ACC: [id number] EXAM:  CT BRAIN   ORDERED BY: ROSIO BLANCHARD     PROCEDURE DATE:  2023          INTERPRETATION:  CLINICAL INFORMATION: Fall. On blood thinners. Dizzy.   Hit right side of forehead.    TECHNIQUE:  Axial CT images were acquired through the head and cervical spine.  Intravenous contrast: None  Sagittal and coronal reformats were generated.  3-D reformats of the cervical spine.    COMPARISON STUDY: None    FINDINGS:  CT head:  There is no mass effect, intracranial hemorrhage, or midline shift.    There is no CT evidence of acute territorial infarct.    The ventricles and sulci are appropriate in size and configuration for   patient's stated age.    The imaged portions of the paranasal sinuses and mastoids are well   aerated.    9 mm nodule in the soft tissues of the right posterior parietal region   (3:39) may represent a small hematoma in the setting of right-sided head   trauma versus a chronic finding such as a small sebaceous cyst.    There is no osseous abnormality.  Bilateral cataract surgery.    CT cervical spine:  There is no evidence of fracture or subluxation.  The vertebral bodies   are normally aligned.  The prevertebral soft tissues are unremarkable.    There are multilevel degenerative changes characterized by disc   osteophyte complexes and facet and uncinate hypertrophy. This results in   mild canal stenosis and multilevel neural foraminal narrowing.    The visualized intracranial structures are unremarkable.  The soft   tissues of the neck are grossly normal.  The lung apices are clear.  Thyroid nodules including a peripherally calcified 1.6 cm right lobe   nodule.    IMPRESSION:    CT Head:  No intracranial hemorrhage or other acute finding    CT Cervical Spine: No acute fracture or subluxation    --- End of Report ---    NEELIMA RIBEIRO MD; Attending Radiologist  This document has been electronically signed. 2023  4:53AM    ACC: [id number] EXAM:  XR CHEST PORTABLE IMMED 1V   ORDERED BY: ROSIO BLANCHARD     PROCEDURE DATE:  2023          INTERPRETATION:  CLINICAL STATEMENT: Chest Pain.    TECHNIQUE: AP view of the chest.    COMPARISON: 2023    FINDINGS/  IMPRESSION:  Study limited due to rotation.    No consolidation or pleural effusion.    Chronic deformities shoulders again noted with nonspecific lucent lesion   at the right inferior glenoid.    Heart size cannot be accurately assessed in this projection.    --- End of Report ---    MILENA MENDOZA MD; Attending Radiologist  This document has been electronically signed. 2023  9:24AM    Imaging Personally Reviewed:  [x ] YES  [ ] NO    Consultant(s) Notes Reviewed:  [x ] YES  [ ] NO    PHYSICAL EXAM:  GENERAL: NA  HEAD: + right forehead laceration  EYES: PERRLA, conjunctiva and sclera clear  ENMT: Moist mucous membranes  NECK: Supple  NERVOUS SYSTEM:  Alert & Oriented X1  CHEST/LUNG: CTA bilaterally; No rales, rhonchi, wheezing  HEART: Regular rate   ABDOMEN: Soft, Nontender, Nondistended; Bowel sounds present  EXTREMITIES:  2+ Peripheral Pulses  SKIN: + bruising rt hip    Care Collaborated Discussed with Consultants/Other Providers [x ] YES  [ ] NO

## 2023-03-01 NOTE — PROGRESS NOTE ADULT - ASSESSMENT
A 89 year old female, from home, ambulating w/ a walker at baseline, w/ PMHx of Atrial Flutter (on Apixaban), Epilepsy, Anxiety, and Urinary incontinence, was brought into the ED by her family due to a fall. CT Head showed no intracranial hemorrhage and CT cervical spine did not show any fracture. EKG showed Atrial Flutter, similar to the previous one in Jan 2023.  Admitted to medicine for further evaluation and management.

## 2023-03-01 NOTE — PROGRESS NOTE ADULT - PROBLEM SELECTOR PLAN 3
target blood pressure of 150/80 mm Hg.   Will continue Amlodipine 2.5 mg w/ parameters  DASH diet  Monitor BP and adjust medication as needed.

## 2023-03-01 NOTE — DISCHARGE NOTE PROVIDER - CARE PROVIDER_API CALL
Vincent Ureña)  Internal Medicine  [phone] [place] [address]  Phone: [phone]  Fax: [phone]  Follow Up Time:

## 2023-03-02 DIAGNOSIS — M50.30 OTHER CERVICAL DISC DEGENERATION, UNSPECIFIED CERVICAL REGION: ICD-10-CM

## 2023-03-02 DIAGNOSIS — Z02.9 ENCOUNTER FOR ADMINISTRATIVE EXAMINATIONS, UNSPECIFIED: ICD-10-CM

## 2023-03-02 DIAGNOSIS — M48.02 SPINAL STENOSIS, CERVICAL REGION: ICD-10-CM

## 2023-03-02 LAB
ANION GAP SERPL CALC-SCNC: 5 MMOL/L — SIGNIFICANT CHANGE UP (ref 5–17)
BUN SERPL-MCNC: 16 MG/DL — SIGNIFICANT CHANGE UP (ref 7–18)
CALCIUM SERPL-MCNC: 9.1 MG/DL — SIGNIFICANT CHANGE UP (ref 8.4–10.5)
CHLORIDE SERPL-SCNC: 106 MMOL/L — SIGNIFICANT CHANGE UP (ref 96–108)
CO2 SERPL-SCNC: 26 MMOL/L — SIGNIFICANT CHANGE UP (ref 22–31)
CREAT SERPL-MCNC: 0.81 MG/DL — SIGNIFICANT CHANGE UP (ref 0.5–1.3)
EGFR: 67 ML/MIN/1.73M2 — SIGNIFICANT CHANGE UP
GLUCOSE SERPL-MCNC: 90 MG/DL — SIGNIFICANT CHANGE UP (ref 70–99)
HCT VFR BLD CALC: 40 % — SIGNIFICANT CHANGE UP (ref 34.5–45)
HGB BLD-MCNC: 12.6 G/DL — SIGNIFICANT CHANGE UP (ref 11.5–15.5)
MAGNESIUM SERPL-MCNC: 2.4 MG/DL — SIGNIFICANT CHANGE UP (ref 1.6–2.6)
MCHC RBC-ENTMCNC: 26.3 PG — LOW (ref 27–34)
MCHC RBC-ENTMCNC: 31.5 GM/DL — LOW (ref 32–36)
MCV RBC AUTO: 83.5 FL — SIGNIFICANT CHANGE UP (ref 80–100)
NRBC # BLD: 0 /100 WBCS — SIGNIFICANT CHANGE UP (ref 0–0)
PHOSPHATE SERPL-MCNC: 3.2 MG/DL — SIGNIFICANT CHANGE UP (ref 2.5–4.5)
PLATELET # BLD AUTO: 158 K/UL — SIGNIFICANT CHANGE UP (ref 150–400)
POTASSIUM SERPL-MCNC: 4.2 MMOL/L — SIGNIFICANT CHANGE UP (ref 3.5–5.3)
POTASSIUM SERPL-SCNC: 4.2 MMOL/L — SIGNIFICANT CHANGE UP (ref 3.5–5.3)
RBC # BLD: 4.79 M/UL — SIGNIFICANT CHANGE UP (ref 3.8–5.2)
RBC # FLD: 14.4 % — SIGNIFICANT CHANGE UP (ref 10.3–14.5)
SODIUM SERPL-SCNC: 137 MMOL/L — SIGNIFICANT CHANGE UP (ref 135–145)
WBC # BLD: 8.72 K/UL — SIGNIFICANT CHANGE UP (ref 3.8–10.5)
WBC # FLD AUTO: 8.72 K/UL — SIGNIFICANT CHANGE UP (ref 3.8–10.5)

## 2023-03-02 RX ORDER — LIDOCAINE 4 G/100G
1 CREAM TOPICAL DAILY
Refills: 0 | Status: DISCONTINUED | OUTPATIENT
Start: 2023-03-02 | End: 2023-03-09

## 2023-03-02 RX ADMIN — AMLODIPINE BESYLATE 2.5 MILLIGRAM(S): 2.5 TABLET ORAL at 06:32

## 2023-03-02 RX ADMIN — LIDOCAINE 1 PATCH: 4 CREAM TOPICAL at 17:27

## 2023-03-02 RX ADMIN — LIDOCAINE 1 PATCH: 4 CREAM TOPICAL at 20:01

## 2023-03-02 RX ADMIN — SERTRALINE 100 MILLIGRAM(S): 25 TABLET, FILM COATED ORAL at 12:48

## 2023-03-02 RX ADMIN — APIXABAN 2.5 MILLIGRAM(S): 2.5 TABLET, FILM COATED ORAL at 06:32

## 2023-03-02 RX ADMIN — Medication 25 MILLIGRAM(S): at 06:32

## 2023-03-02 RX ADMIN — LACOSAMIDE 100 MILLIGRAM(S): 50 TABLET ORAL at 17:28

## 2023-03-02 RX ADMIN — LACOSAMIDE 100 MILLIGRAM(S): 50 TABLET ORAL at 06:31

## 2023-03-02 RX ADMIN — APIXABAN 2.5 MILLIGRAM(S): 2.5 TABLET, FILM COATED ORAL at 17:28

## 2023-03-02 RX ADMIN — Medication 5 MILLIGRAM(S): at 12:48

## 2023-03-02 RX ADMIN — PANTOPRAZOLE SODIUM 40 MILLIGRAM(S): 20 TABLET, DELAYED RELEASE ORAL at 06:32

## 2023-03-02 NOTE — PROGRESS NOTE ADULT - PROBLEM SELECTOR PLAN 3
target blood pressure of 150/80 mm Hg.   Will continue Amlodipine 2.5 mg w/ parameters  DASH diet  Monitor BP and adjust medication as needed. plan as above

## 2023-03-02 NOTE — PROGRESS NOTE ADULT - PROBLEM SELECTOR PLAN 1
CT Head showed no intracranial hemorrhage and CT cervical spine did not show any fracture.   EKG showed Atrial Flutter.  Likely mechanical falls due to balance problems  PT recommendation: EDUARDA  c/w Neuro checks   c/w Telemetry   Fall precautions pt p/w unsteady gait and right hip ecchymosis  possibly due to mild cervical stenosis and degenerative disc disease  CT Head showed no intracranial hemorrhage and CT cervical spine did not show any fracture.   PT recommendation: EDUARDA  Fall precautions  f/u xray pelvis

## 2023-03-02 NOTE — PROGRESS NOTE ADULT - PROBLEM SELECTOR PLAN 7
Will continue Vimpat 100 mg PO BID Will continue Sertraline 100 mg PO qd  can also contribute to dizziness/falls Will continue Sertraline 100 mg PO qd  per daughter has been on it for 20 years, can have some effect on dizziness

## 2023-03-02 NOTE — PROGRESS NOTE ADULT - ASSESSMENT
89 year old Yakut speaking female, from home, ambulating w/ a walker at baseline, w/ PMHx of Atrial Flutter (on Apixaban), Epilepsy, Anxiety, and Urinary incontinence, was brought into the ED by her family due to a fall. CT Head showed no intracranial hemorrhage and CT cervical spine did not show any fracture. EKG showed Atrial Flutter, similar to the previous one in Jan 2023.  Admitted to medicine for ambulatory dysfunction. Pt noted for right hip ecchymosis. pending xray of pelvis. PT rec EDUARDA, awaiting acceptance.

## 2023-03-02 NOTE — PROGRESS NOTE ADULT - PROBLEM SELECTOR PLAN 1
pt p/w unsteady gait and right hip ecchymosis  possibly due to mild cervical stenosis and degenerative disc disease  CT Head showed no intracranial hemorrhage and CT cervical spine did not show any fracture.   PT recommendation: EDUARDA  Fall precautions  f/u xray pelvis

## 2023-03-02 NOTE — PROGRESS NOTE ADULT - PROBLEM SELECTOR PLAN 4
EKG showed Atrial Flutter w/ variable AV block. Similar to the one in Jan 2023.   continue Apixaban 2.5 mg PO BID and Metoprolol succinate 25 mg PO qd  HR controlled

## 2023-03-02 NOTE — PROGRESS NOTE ADULT - SUBJECTIVE AND OBJECTIVE BOX
Patient is a 89y old  Female who presents with a chief complaint of Multiple falls (01 Mar 2023 15:49)    OVERNIGHT EVENTS: no acute changes. dc tele.     REVIEW OF SYSTEMS: Barbadian speaking,  at bedside.   CONSTITUTIONAL: No fever, chills  ENMT:  No difficulty hearing, no change in vision  NECK: No pain or stiffness  RESPIRATORY: No cough, SOB  CARDIOVASCULAR: No chest pain, palpitations  GASTROINTESTINAL: No abdominal pain. No nausea, vomiting, or diarrhea  GENITOURINARY: No dysuria  NEUROLOGICAL: No HA  SKIN: No itching, burning, rashes, or lesions   LYMPH NODES: No enlarged glands  ENDOCRINE: No heat or cold intolerance; No hair loss  MUSCULOSKELETAL: +right shoulder pain.   PSYCHIATRIC: No depression, anxiety  HEME/LYMPH: No easy bruising, or bleeding gums    T(C): 36.2 (03-02-23 @ 13:44), Max: 37.3 (03-01-23 @ 21:38)  HR: 88 (03-02-23 @ 13:44) (72 - 91)  BP: 113/79 (03-02-23 @ 13:44) (95/61 - 131/68)  RR: 18 (03-02-23 @ 13:44) (17 - 18)  SpO2: 97% (03-02-23 @ 13:44) (95% - 97%)  Wt(kg): --Vital Signs Last 24 Hrs  T(C): 36.2 (02 Mar 2023 13:44), Max: 37.3 (01 Mar 2023 21:38)  T(F): 97.2 (02 Mar 2023 13:44), Max: 99.1 (01 Mar 2023 21:38)  HR: 88 (02 Mar 2023 13:44) (72 - 91)  BP: 113/79 (02 Mar 2023 13:44) (95/61 - 131/68)  BP(mean): --  RR: 18 (02 Mar 2023 13:44) (17 - 18)  SpO2: 97% (02 Mar 2023 13:44) (95% - 97%)    Parameters below as of 02 Mar 2023 13:44  Patient On (Oxygen Delivery Method): room air        MEDICATIONS  (STANDING):  amLODIPine   Tablet 2.5 milliGRAM(s) Oral daily  apixaban 2.5 milliGRAM(s) Oral every 12 hours  lacosamide 100 milliGRAM(s) Oral two times a day  metoprolol succinate ER 25 milliGRAM(s) Oral daily  oxybutynin XL 5 milliGRAM(s) Oral daily  pantoprazole    Tablet 40 milliGRAM(s) Oral before breakfast  sertraline 100 milliGRAM(s) Oral daily    MEDICATIONS  (PRN):  aluminum hydroxide/magnesium hydroxide/simethicone Suspension 30 milliLiter(s) Oral every 6 hours PRN Dyspepsia      PHYSICAL EXAM:  GENERAL: NAD  EYES: clear conjunctiva; EOMI  ENMT: Moist mucous membranes  NECK: Supple, No JVD, Normal thyroid  CHEST/LUNG: Clear to auscultation bilaterally; No rales, rhonchi, wheezing, or rubs  HEART: S1, S2, Regular rate and rhythm  ABDOMEN: Soft, Nontender, Nondistended; Bowel sounds present  NEURO: Alert & Oriented X3  EXTREMITIES: No LE edema, no calf tenderness  LYMPH: No lymphadenopathy noted  SKIN: No rashes or lesions    Consultant(s) Notes Reviewed:  [x ] YES  [ ] NO  Care Discussed with Consultants/Other Providers [ x] YES  [ ] NO    LABS:                        12.6   8.72  )-----------( 158      ( 02 Mar 2023 05:54 )             40.0     03-02    137  |  106  |  16  ----------------------------<  90  4.2   |  26  |  0.81    Ca    9.1      02 Mar 2023 05:54  Phos  3.2     03-02  Mg     2.4     03-02    TPro  6.5  /  Alb  3.3<L>  /  TBili  0.8  /  DBili  x   /  AST  15  /  ALT  17  /  AlkPhos  111  03-01    PT/INR - ( 01 Mar 2023 06:04 )   PT: 14.0 sec;   INR: 1.17 ratio         PTT - ( 01 Mar 2023 06:04 )  PTT:31.1 sec  CAPILLARY BLOOD GLUCOSE                RADIOLOGY & ADDITIONAL TESTS:    Imaging Personally Reviewed:  [ ] YES  [ ] NO   Patient is a 89y old  Female who presents with a chief complaint of Multiple falls (01 Mar 2023 15:49)    OVERNIGHT EVENTS: no acute changes. dc tele.     REVIEW OF SYSTEMS: Mexican speaking,  at bedside.   CONSTITUTIONAL: No fever, chills  ENMT:  No difficulty hearing, no change in vision  NECK: No pain or stiffness  RESPIRATORY: No cough, SOB  CARDIOVASCULAR: No chest pain, palpitations  GASTROINTESTINAL: No abdominal pain. No nausea, vomiting, or diarrhea  GENITOURINARY: No dysuria  NEUROLOGICAL: No HA  SKIN: No itching, burning, rashes, or lesions   LYMPH NODES: No enlarged glands  ENDOCRINE: No heat or cold intolerance; No hair loss  MUSCULOSKELETAL: +right shoulder pain.   PSYCHIATRIC: No depression, anxiety  HEME/LYMPH: No easy bruising, or bleeding gums    T(C): 36.2 (03-02-23 @ 13:44), Max: 37.3 (03-01-23 @ 21:38)  HR: 88 (03-02-23 @ 13:44) (72 - 91)  BP: 113/79 (03-02-23 @ 13:44) (95/61 - 131/68)  RR: 18 (03-02-23 @ 13:44) (17 - 18)  SpO2: 97% (03-02-23 @ 13:44) (95% - 97%)  Wt(kg): --Vital Signs Last 24 Hrs  T(C): 36.2 (02 Mar 2023 13:44), Max: 37.3 (01 Mar 2023 21:38)  T(F): 97.2 (02 Mar 2023 13:44), Max: 99.1 (01 Mar 2023 21:38)  HR: 88 (02 Mar 2023 13:44) (72 - 91)  BP: 113/79 (02 Mar 2023 13:44) (95/61 - 131/68)  BP(mean): --  RR: 18 (02 Mar 2023 13:44) (17 - 18)  SpO2: 97% (02 Mar 2023 13:44) (95% - 97%)    Parameters below as of 02 Mar 2023 13:44  Patient On (Oxygen Delivery Method): room air        MEDICATIONS  (STANDING):  amLODIPine   Tablet 2.5 milliGRAM(s) Oral daily  apixaban 2.5 milliGRAM(s) Oral every 12 hours  lacosamide 100 milliGRAM(s) Oral two times a day  metoprolol succinate ER 25 milliGRAM(s) Oral daily  oxybutynin XL 5 milliGRAM(s) Oral daily  pantoprazole    Tablet 40 milliGRAM(s) Oral before breakfast  sertraline 100 milliGRAM(s) Oral daily    MEDICATIONS  (PRN):  aluminum hydroxide/magnesium hydroxide/simethicone Suspension 30 milliLiter(s) Oral every 6 hours PRN Dyspepsia      PHYSICAL EXAM:  GENERAL: well-appearing, NAD  EYES: clear conjunctiva  ENMT: Moist mucous membranes  NECK: Supple, No JVD, Normal thyroid  CHEST/LUNG: Clear to auscultation bilaterally; No rales, rhonchi, wheezing, or rubs  HEART: S1, S2, Regular rate and rhythm  ABDOMEN: Soft, Nontender, Nondistended; Bowel sounds present  NEURO: Alert & Oriented X3  EXTREMITIES: +right hip ecchymosis. No LE edema, no calf tenderness  LYMPH: No lymphadenopathy noted  SKIN: No rashes or lesions    Consultant(s) Notes Reviewed:  [x ] YES  [ ] NO  Care Discussed with Consultants/Other Providers [ x] YES  [ ] NO    LABS:                        12.6   8.72  )-----------( 158      ( 02 Mar 2023 05:54 )             40.0     03-02    137  |  106  |  16  ----------------------------<  90  4.2   |  26  |  0.81    Ca    9.1      02 Mar 2023 05:54  Phos  3.2     03-02  Mg     2.4     03-02    TPro  6.5  /  Alb  3.3<L>  /  TBili  0.8  /  DBili  x   /  AST  15  /  ALT  17  /  AlkPhos  111  03-01    PT/INR - ( 01 Mar 2023 06:04 )   PT: 14.0 sec;   INR: 1.17 ratio         PTT - ( 01 Mar 2023 06:04 )  PTT:31.1 sec  CAPILLARY BLOOD GLUCOSE                RADIOLOGY & ADDITIONAL TESTS:    Imaging Personally Reviewed:  [ ] YES  [ ] NO   Patient is a 89y old  Female who presents with a chief complaint of Multiple falls (01 Mar 2023 15:49)    OVERNIGHT EVENTS: no acute changes. dc tele.     REVIEW OF SYSTEMS: Latvian speaking,  at bedside.   CONSTITUTIONAL: No fever, chills  ENMT:  No difficulty hearing, no change in vision  NECK: No pain or stiffness  RESPIRATORY: No cough, SOB  CARDIOVASCULAR: No chest pain, palpitations  GASTROINTESTINAL: No abdominal pain. No nausea, vomiting, or diarrhea  GENITOURINARY: No dysuria  NEUROLOGICAL: No HA  SKIN: No itching, burning, rashes, or lesions   LYMPH NODES: No enlarged glands  ENDOCRINE: No heat or cold intolerance; No hair loss  MUSCULOSKELETAL: +left shoulder pain.   PSYCHIATRIC: No depression, anxiety  HEME/LYMPH: No easy bruising, or bleeding gums    T(C): 36.2 (03-02-23 @ 13:44), Max: 37.3 (03-01-23 @ 21:38)  HR: 88 (03-02-23 @ 13:44) (72 - 91)  BP: 113/79 (03-02-23 @ 13:44) (95/61 - 131/68)  RR: 18 (03-02-23 @ 13:44) (17 - 18)  SpO2: 97% (03-02-23 @ 13:44) (95% - 97%)  Wt(kg): --Vital Signs Last 24 Hrs  T(C): 36.2 (02 Mar 2023 13:44), Max: 37.3 (01 Mar 2023 21:38)  T(F): 97.2 (02 Mar 2023 13:44), Max: 99.1 (01 Mar 2023 21:38)  HR: 88 (02 Mar 2023 13:44) (72 - 91)  BP: 113/79 (02 Mar 2023 13:44) (95/61 - 131/68)  BP(mean): --  RR: 18 (02 Mar 2023 13:44) (17 - 18)  SpO2: 97% (02 Mar 2023 13:44) (95% - 97%)    Parameters below as of 02 Mar 2023 13:44  Patient On (Oxygen Delivery Method): room air    MEDICATIONS  (STANDING):  amLODIPine   Tablet 2.5 milliGRAM(s) Oral daily  apixaban 2.5 milliGRAM(s) Oral every 12 hours  lacosamide 100 milliGRAM(s) Oral two times a day  metoprolol succinate ER 25 milliGRAM(s) Oral daily  oxybutynin XL 5 milliGRAM(s) Oral daily  pantoprazole    Tablet 40 milliGRAM(s) Oral before breakfast  sertraline 100 milliGRAM(s) Oral daily    MEDICATIONS  (PRN):  aluminum hydroxide/magnesium hydroxide/simethicone Suspension 30 milliLiter(s) Oral every 6 hours PRN Dyspepsia    PHYSICAL EXAM:  GENERAL: well-appearing, NAD  EYES: clear conjunctiva  ENMT: Moist mucous membranes  NECK: Supple, No JVD, Normal thyroid  CHEST/LUNG: Clear to auscultation bilaterally; No rales, rhonchi, wheezing, or rubs  HEART: S1, S2, Regular rate and rhythm  ABDOMEN: Soft, Nontender, Nondistended; Bowel sounds present  NEURO: Alert & Oriented X2 (not time)  EXTREMITIES: +right hip ecchymosis. No LE edema, no calf tenderness  LYMPH: No lymphadenopathy noted  SKIN: No rashes or lesions    Consultant(s) Notes Reviewed:  [x ] YES  [ ] NO  Care Discussed with Consultants/Other Providers [ x] YES  [ ] NO    LABS:                        12.6   8.72  )-----------( 158      ( 02 Mar 2023 05:54 )             40.0     03-02    137  |  106  |  16  ----------------------------<  90  4.2   |  26  |  0.81    Ca    9.1      02 Mar 2023 05:54  Phos  3.2     03-02  Mg     2.4     03-02    TPro  6.5  /  Alb  3.3<L>  /  TBili  0.8  /  DBili  x   /  AST  15  /  ALT  17  /  AlkPhos  111  03-01    PT/INR - ( 01 Mar 2023 06:04 )   PT: 14.0 sec;   INR: 1.17 ratio         PTT - ( 01 Mar 2023 06:04 )  PTT:31.1 sec  CAPILLARY BLOOD GLUCOSE                RADIOLOGY & ADDITIONAL TESTS:    Imaging Personally Reviewed:  [ ] YES  [ ] NO

## 2023-03-02 NOTE — PROGRESS NOTE ADULT - PROBLEM SELECTOR PLAN 5
CT cervical spine showed thyroid nodules including a peripherally calcified 1.6 cm right lobe nodule  TSH 2.64  Thyroid US as outpatient continue home med Amlodipine 2.5 mg w/ parameters  BP controlled

## 2023-03-02 NOTE — PROGRESS NOTE ADULT - PROBLEM SELECTOR PLAN 4
UA noted  Asymptomatic   F/U Urine Cx (sent by ED)  No indications for any antibiotic   Will monitor EKG showed Atrial Flutter w/ variable AV block. Similar to the one in Jan 2023.   continue Apixaban 2.5 mg PO BID and Metoprolol succinate 25 mg PO qd  HR controlled

## 2023-03-02 NOTE — PROGRESS NOTE ADULT - PROBLEM SELECTOR PLAN 9
Will continue Eliquis 2.5 mg PO BID  Will continue PPI management. Will continue Oxybutinin 5 mg PO qd

## 2023-03-02 NOTE — PROGRESS NOTE ADULT - PROBLEM SELECTOR PLAN 6
Will continue Sertraline 100 mg PO qd CT cervical spine showed thyroid nodules including a peripherally calcified 1.6 cm right lobe nodule  TSH 2.64  Thyroid US as outpatient

## 2023-03-02 NOTE — PROGRESS NOTE ADULT - ASSESSMENT
89 year old Danish speaking female, from home, ambulating w/ a walker at baseline, w/ PMHx of Atrial Flutter (on Apixaban), Epilepsy, Anxiety, and Urinary incontinence, was brought into the ED by her family due to a fall. CT Head showed no intracranial hemorrhage and CT cervical spine did not show any fracture. EKG showed Atrial Flutter, similar to the previous one in Jan 2023.  Admitted to medicine for ambulatory dysfunction.  89 year old German speaking female, from home, ambulating w/ a walker at baseline, w/ PMHx of Atrial Flutter (on Apixaban), Epilepsy, Anxiety, and Urinary incontinence, was brought into the ED by her family due to a fall. CT Head showed no intracranial hemorrhage and CT cervical spine did not show any fracture. EKG showed Atrial Flutter, similar to the previous one in Jan 2023.  Admitted to medicine for ambulatory dysfunction. Pt noted for right hip ecchymosis. pending xray of pelvis. PT rec EDUARDA, awaiting acceptance.

## 2023-03-02 NOTE — PROGRESS NOTE ADULT - PROBLEM SELECTOR PLAN 2
EKG showed Atrial Flutter w/ variable AV block. Similar to the one in Jan 2023.   Will continue Apixaban 2.5 mg PO BID  Will continue Metoprolol 25 mg PO qd pt denies any pain  conservative management

## 2023-03-02 NOTE — PROGRESS NOTE ADULT - SUBJECTIVE AND OBJECTIVE BOX
Patient is a 89y old  Female who presents with a chief complaint of Multiple falls (01 Mar 2023 15:49)    OVERNIGHT EVENTS: no acute changes. dc tele.     REVIEW OF SYSTEMS: Turkmen speaking,  at bedside.   CONSTITUTIONAL: No fever, chills  ENMT:  No difficulty hearing, no change in vision  NECK: No pain or stiffness  RESPIRATORY: No cough, SOB  CARDIOVASCULAR: No chest pain, palpitations  GASTROINTESTINAL: No abdominal pain. No nausea, vomiting, or diarrhea  GENITOURINARY: No dysuria  NEUROLOGICAL: No HA  SKIN: No itching, burning, rashes, or lesions   LYMPH NODES: No enlarged glands  ENDOCRINE: No heat or cold intolerance; No hair loss  MUSCULOSKELETAL: +right shoulder pain.   PSYCHIATRIC: No depression, anxiety  HEME/LYMPH: No easy bruising, or bleeding gums    T(C): 36.2 (03-02-23 @ 13:44), Max: 37.3 (03-01-23 @ 21:38)  HR: 88 (03-02-23 @ 13:44) (72 - 91)  BP: 113/79 (03-02-23 @ 13:44) (95/61 - 131/68)  RR: 18 (03-02-23 @ 13:44) (17 - 18)  SpO2: 97% (03-02-23 @ 13:44) (95% - 97%)  Wt(kg): --Vital Signs Last 24 Hrs  T(C): 36.2 (02 Mar 2023 13:44), Max: 37.3 (01 Mar 2023 21:38)  T(F): 97.2 (02 Mar 2023 13:44), Max: 99.1 (01 Mar 2023 21:38)  HR: 88 (02 Mar 2023 13:44) (72 - 91)  BP: 113/79 (02 Mar 2023 13:44) (95/61 - 131/68)  BP(mean): --  RR: 18 (02 Mar 2023 13:44) (17 - 18)  SpO2: 97% (02 Mar 2023 13:44) (95% - 97%)    Parameters below as of 02 Mar 2023 13:44  Patient On (Oxygen Delivery Method): room air        MEDICATIONS  (STANDING):  amLODIPine   Tablet 2.5 milliGRAM(s) Oral daily  apixaban 2.5 milliGRAM(s) Oral every 12 hours  lacosamide 100 milliGRAM(s) Oral two times a day  metoprolol succinate ER 25 milliGRAM(s) Oral daily  oxybutynin XL 5 milliGRAM(s) Oral daily  pantoprazole    Tablet 40 milliGRAM(s) Oral before breakfast  sertraline 100 milliGRAM(s) Oral daily    MEDICATIONS  (PRN):  aluminum hydroxide/magnesium hydroxide/simethicone Suspension 30 milliLiter(s) Oral every 6 hours PRN Dyspepsia      PHYSICAL EXAM:  GENERAL: well-appearing, NAD  EYES: clear conjunctiva  ENMT: Moist mucous membranes  NECK: Supple, No JVD, Normal thyroid  CHEST/LUNG: dec breath sounds at bases  HEART: S1, S2, Regular rate and rhythm  ABDOMEN: Soft, Nontender, Nondistended; Bowel sounds present  NEURO: Alert & Oriented X3  EXTREMITIES: +right hip ecchymosis. No LE edema, no calf tenderness  LYMPH: No lymphadenopathy noted  SKIN: No rashes or lesions    Consultant(s) Notes Reviewed:  [x ] YES  [ ] NO  Care Discussed with Consultants/Other Providers [ x] YES  [ ] NO    LABS:                        12.6   8.72  )-----------( 158      ( 02 Mar 2023 05:54 )             40.0     03-02    137  |  106  |  16  ----------------------------<  90  4.2   |  26  |  0.81    Ca    9.1      02 Mar 2023 05:54  Phos  3.2     03-02  Mg     2.4     03-02    TPro  6.5  /  Alb  3.3<L>  /  TBili  0.8  /  DBili  x   /  AST  15  /  ALT  17  /  AlkPhos  111  03-01    PT/INR - ( 01 Mar 2023 06:04 )   PT: 14.0 sec;   INR: 1.17 ratio         PTT - ( 01 Mar 2023 06:04 )  PTT:31.1 sec  CAPILLARY BLOOD GLUCOSE                RADIOLOGY & ADDITIONAL TESTS:    Imaging Personally Reviewed:  [ ] YES  [ ] NO

## 2023-03-02 NOTE — PROGRESS NOTE ADULT - PROBLEM SELECTOR PLAN 7
Will continue Sertraline 100 mg PO qd  per daughter has been on it for 20 years, can have some effect on dizziness

## 2023-03-03 DIAGNOSIS — R73.03 PREDIABETES: ICD-10-CM

## 2023-03-03 DIAGNOSIS — M19.90 UNSPECIFIED OSTEOARTHRITIS, UNSPECIFIED SITE: ICD-10-CM

## 2023-03-03 LAB
FLUAV AG NPH QL: SIGNIFICANT CHANGE UP
FLUBV AG NPH QL: SIGNIFICANT CHANGE UP
SARS-COV-2 RNA SPEC QL NAA+PROBE: SIGNIFICANT CHANGE UP

## 2023-03-03 RX ORDER — ACETAMINOPHEN 500 MG
650 TABLET ORAL EVERY 6 HOURS
Refills: 0 | Status: DISCONTINUED | OUTPATIENT
Start: 2023-03-03 | End: 2023-03-09

## 2023-03-03 RX ADMIN — LIDOCAINE 1 PATCH: 4 CREAM TOPICAL at 06:55

## 2023-03-03 RX ADMIN — APIXABAN 2.5 MILLIGRAM(S): 2.5 TABLET, FILM COATED ORAL at 17:28

## 2023-03-03 RX ADMIN — LACOSAMIDE 100 MILLIGRAM(S): 50 TABLET ORAL at 17:28

## 2023-03-03 RX ADMIN — SERTRALINE 100 MILLIGRAM(S): 25 TABLET, FILM COATED ORAL at 13:27

## 2023-03-03 RX ADMIN — Medication 5 MILLIGRAM(S): at 13:27

## 2023-03-03 RX ADMIN — PANTOPRAZOLE SODIUM 40 MILLIGRAM(S): 20 TABLET, DELAYED RELEASE ORAL at 06:58

## 2023-03-03 RX ADMIN — Medication 25 MILLIGRAM(S): at 06:58

## 2023-03-03 RX ADMIN — LACOSAMIDE 100 MILLIGRAM(S): 50 TABLET ORAL at 06:58

## 2023-03-03 RX ADMIN — APIXABAN 2.5 MILLIGRAM(S): 2.5 TABLET, FILM COATED ORAL at 06:58

## 2023-03-03 RX ADMIN — LIDOCAINE 1 PATCH: 4 CREAM TOPICAL at 13:28

## 2023-03-03 RX ADMIN — AMLODIPINE BESYLATE 2.5 MILLIGRAM(S): 2.5 TABLET ORAL at 06:58

## 2023-03-03 RX ADMIN — LIDOCAINE 1 PATCH: 4 CREAM TOPICAL at 20:05

## 2023-03-03 NOTE — PROGRESS NOTE ADULT - PROBLEM SELECTOR PLAN 4
pt c/o chronic left shoulder pain  hx of arthritis  c/w lidocaine patch daily  c/w tylenol 650 mg q6h PRN

## 2023-03-03 NOTE — PROGRESS NOTE ADULT - ASSESSMENT
89 year old Setswana speaking female, from home, ambulating w/ a walker at baseline, w/ PMHx of Atrial Flutter (on Apixaban), Epilepsy, Anxiety, and Urinary incontinence, was brought into the ED by her family due to a fall. CT Head showed no intracranial hemorrhage and CT cervical spine did not show any fracture. EKG showed Atrial Flutter, similar to the previous one in Jan 2023.  Admitted to medicine for ambulatory dysfunction. Pt noted for right hip ecchymosis. pending xray of pelvis. PT rec [facility], awaiting acceptance.

## 2023-03-03 NOTE — PROGRESS NOTE ADULT - PROBLEM SELECTOR PLAN 9
Will continue Oxybutinin 5 mg PO qd a1c 5.8   continue consistent carb diet  glucose controlled Will continue Vimpat 100 mg PO BID

## 2023-03-03 NOTE — PROGRESS NOTE ADULT - PROBLEM SELECTOR PLAN 11
f/u xray hip  pending acceptance to [facility]  last covid neg 2/28 Will continue Eliquis 2.5 mg PO BID  Will continue PPI management. Will continue Oxybutinin 5 mg PO qd

## 2023-03-03 NOTE — PROGRESS NOTE ADULT - PROBLEM SELECTOR PLAN 4
EKG showed Atrial Flutter w/ variable AV block. Similar to the one in Jan 2023.   continue Apixaban 2.5 mg PO BID and Metoprolol succinate 25 mg PO qd  HR controlled pt c/o chronic left shoulder pain  hx of arthritis  c/w lidocaine patch daily  c/w tylenol 650 mg q6h PRN

## 2023-03-03 NOTE — PROGRESS NOTE ADULT - PROBLEM SELECTOR PLAN 8
Will continue Vimpat 100 mg PO BID Will continue Sertraline 100 mg PO qd  per daughter has been on it for 20 years, can have some effect on dizziness

## 2023-03-03 NOTE — PROGRESS NOTE ADULT - PROBLEM SELECTOR PLAN 7
1dMale, born at 38.6 weeks gestation via  to a 33 year old, , B+ mother. Rubella non-immune, RPR, NR, HIV NR, HbSAg neg, GBS positive, EOS 0.03, tx'd with AMP x2. Maternal hx significant for LEEP 2017, tonsillectomy, ocular migraines  Apgar 8/9, Infant Birth Wt:3180 (7lbs)  Length: 21.5  HC:35  (Exclusively BF) Cat 2 tracing. Baby transitioning well in the NBN.    in the DR. Due to void, Due to stool.    Overnight: Feeding, stooling and voiding well. VSS  BW       TW          % loss  Patient seen and examined on day of discharge.  Parents questions answered and discharge instructions given.    DELFINO BIANCHI  TcB at 36HOL=  [place]#    [place]   Will continue Sertraline 100 mg PO qd  per daughter has been on it for 20 years, can have some effect on dizziness CT cervical spine showed thyroid nodules including a peripherally calcified 1.6 cm right lobe nodule  TSH 2.64  Thyroid US as outpatient 2dMale, born at 38.6 weeks gestation via  to a 33 year old, , B+ mother. Rubella non-immune, RPR, NR, HIV NR, HbSAg neg, GBS positive, EOS 0.03, tx'd with AMP x2. Maternal hx significant for LEEP 2017, tonsillectomy, ocular migraines  Apgar 8/9, Infant Birth Wt:3180 (7lbs)  Length: 21.5  HC:35  (Exclusively BF) Cat 2 tracing. Baby transitionedwell in the NBN.    in the DR.     Overnight: Feeding, stooling and voiding well. VSS  BW  7#0     TW 6#10         5.6% loss  Patient seen and examined on day of discharge.  Parents questions answered and discharge instructions given.    OAE passed BL  [facility] 97/  TcB at 36HOL=  [facility]#[id number]    PE   2dMale, born at 38.6 weeks gestation via  to a 33 year old, , B+ mother. Rubella non-immune, RPR, NR, HIV NR, HbSAg neg, GBS positive, EOS 0.03, tx'd with AMP x2. Maternal hx significant for LEEP 2017, tonsillectomy, ocular migraines.  Apgar 8/9, Birth Wt: 3180 grams (7lbs)  Length: 21.5"  HC: 35cm  (Exclusively BF) Cat 2 tracing. Baby transitioned well in the NBN.    in the DR.     Overnight: Feeding, stooling and voiding well. VSS  BW  7#0     TW 6#10         5.6% loss  Patient seen and examined on day of discharge.  Parents questions answered and discharge instructions given.    [facility] BL  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 Rubella non-immune, RPR, NR, HIV NR, HbSAg neg, GBS positive, EOS 0.03, tx'd with AMP x2. Maternal hx significant for LEEP 2017, tonsillectomy, ocular migraines.  Apgar 8/9, Birth Wt: 3180 grams (7lbs)  Length: 21.5"  HC: 35cm  (Exclusively BF) Cat 2 tracing. Baby transitioned well in the NBN.    in the DR.     Overnight: Feeding, stooling and voiding well. VSS  BW  7#0     TW 6#10         5.6% loss  Patient seen and examined on day of discharge.  Parents questions answered and discharge instructions given.    [facility] BL  [facility]  Tc[facility] at 36HOL=9.7=high intermediate risk, breastfeeding exclusively, recommend follow up within 24 hrs  [facility]#[id number]    PE  Skin: No rash, facial jaundice  Head: Anterior fontanelle patent, flat  Bilateral, symmetric Red Reflexes  Nares patent  Pharynx: O/P Palate intact  Lungs: clear symmetrical breath sounds  Cor: RRR without murmur  Abdomen: Soft, nontender and nondistended, without masses; cord intact  : Normal anatomy; testes descended bilaterally   Back: Sacrum without dimple   EXT: 4 extremities symmetric tone, symmetric [place], 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

## 2023-03-03 NOTE — PROGRESS NOTE ADULT - PROBLEM SELECTOR PLAN 5
continue home med Amlodipine 2.5 mg w/ parameters  BP controlled EKG showed Atrial Flutter w/ variable AV block. Similar to the one in Jan 2023.   continue Apixaban 2.5 mg PO BID and Metoprolol succinate 25 mg PO qd  HR controlled

## 2023-03-03 NOTE — PROGRESS NOTE ADULT - PROBLEM SELECTOR PLAN 12
f/u xray hip  pending acceptance to [facility]  last covid neg 2/28 Will continue Eliquis 2.5 mg PO BID  Will continue PPI management.

## 2023-03-03 NOTE — PROGRESS NOTE ADULT - SUBJECTIVE AND OBJECTIVE BOX
Patient is a 89y old  Female who presents with a chief complaint of Multiple falls (02 Mar 2023 14:12)    OVERNIGHT EVENTS: no acute changes, vitals stable, sleeping and eating well. Currently waiting for xray hip.     REVIEW OF SYSTEMS:  #  CONSTITUTIONAL: No fever, chills  ENMT:  No difficulty hearing, no change in vision  NECK: No pain or stiffness  RESPIRATORY: No cough, SOB  CARDIOVASCULAR: No chest pain, palpitations  GASTROINTESTINAL: No abdominal pain. No nausea, vomiting, or diarrhea  GENITOURINARY: No dysuria  NEUROLOGICAL: No HA  SKIN: No itching, burning, rashes, or lesions   LYMPH NODES: No enlarged glands  ENDOCRINE: No heat or cold intolerance; No hair loss  MUSCULOSKELETAL: +left shoulder pain. No joint pain or swelling; No muscle, back, or extremity pain  PSYCHIATRIC: No depression, anxiety  HEME/LYMPH: No easy bruising, or bleeding gums    T(C): 36.7 (03-03-23 @ 05:06), Max: 36.7 (03-03-23 @ 05:06)  HR: 81 (03-03-23 @ 05:06) (66 - 81)  BP: 116/64 (03-03-23 @ 05:06) (116/64 - 126/79)  RR: 18 (03-03-23 @ 05:06) (18 - 18)  SpO2: 96% (03-03-23 @ 05:06) (96% - 96%)  Wt(kg): --Vital Signs Last 24 Hrs  T(C): 36.7 (03 Mar 2023 05:06), Max: 36.7 (03 Mar 2023 05:06)  T(F): 98.1 (03 Mar 2023 05:06), Max: 98.1 (03 Mar 2023 05:06)  HR: 81 (03 Mar 2023 05:06) (66 - 81)  BP: 116/64 (03 Mar 2023 05:06) (116/64 - 126/79)  BP(mean): --  RR: 18 (03 Mar 2023 05:06) (18 - 18)  SpO2: 96% (03 Mar 2023 05:06) (96% - 96%)    Parameters below as of 03 Mar 2023 05:06  Patient On (Oxygen Delivery Method): room air    MEDICATIONS  (STANDING):  amLODIPine   Tablet 2.5 milliGRAM(s) Oral daily  apixaban 2.5 milliGRAM(s) Oral every 12 hours  lacosamide 100 milliGRAM(s) Oral two times a day  lidocaine   4% Patch 1 Patch Transdermal daily  metoprolol succinate ER 25 milliGRAM(s) Oral daily  oxybutynin XL 5 milliGRAM(s) Oral daily  pantoprazole    Tablet 40 milliGRAM(s) Oral before breakfast  sertraline 100 milliGRAM(s) Oral daily    MEDICATIONS  (PRN):  aluminum hydroxide/magnesium hydroxide/simethicone Suspension 30 milliLiter(s) Oral every 6 hours PRN Dyspepsia    PHYSICAL EXAM:  GENERAL: well-appearing, NAD  EYES: clear conjunctiva  ENMT: Moist mucous membranes  NECK: Supple, No JVD, Normal thyroid  CHEST/LUNG: Clear to auscultation bilaterally; No rales, rhonchi, wheezing, or rubs  HEART: S1, S2, Regular rate and rhythm  ABDOMEN: Soft, Nontender, Nondistended; Bowel sounds present  NEURO: Alert & Oriented X2 (not time)  EXTREMITIES: +right hip ecchymosis, no tenderness. No lower leg shortening, or external rotation noted. No LE edema, no calf tenderness  LYMPH: No lymphadenopathy noted  SKIN: No rashes or lesions    Consultant(s) Notes Reviewed:  [x ] YES  [ ] NO  Care Discussed with Consultants/Other Providers [ x] YES  [ ] NO    LABS:                        12.6   8.72  )-----------( 158      ( 02 Mar 2023 05:54 )             40.0     03-02    137  |  106  |  16  ----------------------------<  90  4.2   |  26  |  0.81    Ca    9.1      02 Mar 2023 05:54  Phos  3.2     03-02  Mg     2.4     03-02        CAPILLARY BLOOD GLUCOSE                RADIOLOGY & ADDITIONAL TESTS:  no new tests, waiting for xray hip    Imaging Personally Reviewed:  [ ] YES  [ ] NO   Patient is a 89y old  Female who presents with a chief complaint of Multiple falls (02 Mar 2023 14:12)    OVERNIGHT EVENTS: no acute changes, vitals stable, sleeping and eating well. Currently waiting for xray hip.     REVIEW OF SYSTEMS: daughter at bedside assisted with translation  CONSTITUTIONAL: No fever, chills  ENMT:  No difficulty hearing, no change in vision  NECK: No pain or stiffness  RESPIRATORY: No cough, SOB  CARDIOVASCULAR: No chest pain, palpitations  GASTROINTESTINAL: No abdominal pain. No nausea, vomiting, or diarrhea  GENITOURINARY: No dysuria  NEUROLOGICAL: No HA  SKIN: No itching, burning, rashes, or lesions   LYMPH NODES: No enlarged glands  ENDOCRINE: No heat or cold intolerance; No hair loss  MUSCULOSKELETAL: +left shoulder pain. No joint pain or swelling; No muscle, back, or extremity pain  PSYCHIATRIC: No depression, anxiety  HEME/LYMPH: No easy bruising, or bleeding gums    T(C): 36.7 (03-03-23 @ 05:06), Max: 36.7 (03-03-23 @ 05:06)  HR: 81 (03-03-23 @ 05:06) (66 - 81)  BP: 116/64 (03-03-23 @ 05:06) (116/64 - 126/79)  RR: 18 (03-03-23 @ 05:06) (18 - 18)  SpO2: 96% (03-03-23 @ 05:06) (96% - 96%)  Wt(kg): --Vital Signs Last 24 Hrs  T(C): 36.7 (03 Mar 2023 05:06), Max: 36.7 (03 Mar 2023 05:06)  T(F): 98.1 (03 Mar 2023 05:06), Max: 98.1 (03 Mar 2023 05:06)  HR: 81 (03 Mar 2023 05:06) (66 - 81)  BP: 116/64 (03 Mar 2023 05:06) (116/64 - 126/79)  BP(mean): --  RR: 18 (03 Mar 2023 05:06) (18 - 18)  SpO2: 96% (03 Mar 2023 05:06) (96% - 96%)    Parameters below as of 03 Mar 2023 05:06  Patient On (Oxygen Delivery Method): room air    MEDICATIONS  (STANDING):  amLODIPine   Tablet 2.5 milliGRAM(s) Oral daily  apixaban 2.5 milliGRAM(s) Oral every 12 hours  lacosamide 100 milliGRAM(s) Oral two times a day  lidocaine   4% Patch 1 Patch Transdermal daily  metoprolol succinate ER 25 milliGRAM(s) Oral daily  oxybutynin XL 5 milliGRAM(s) Oral daily  pantoprazole    Tablet 40 milliGRAM(s) Oral before breakfast  sertraline 100 milliGRAM(s) Oral daily    MEDICATIONS  (PRN):  aluminum hydroxide/magnesium hydroxide/simethicone Suspension 30 milliLiter(s) Oral every 6 hours PRN Dyspepsia    PHYSICAL EXAM:  GENERAL: well-appearing, NAD  EYES: clear conjunctiva  ENMT: +right eye brow abrasion, nasal bridge abrasion with healing bruising. Moist mucous membranes  NECK: Supple, No JVD, Normal thyroid  CHEST/LUNG: Clear to auscultation bilaterally; No rales, rhonchi, wheezing, or rubs  HEART: S1, S2, Regular rate and rhythm  ABDOMEN: Soft, Nontender, Nondistended; Bowel sounds present  NEURO: Alert & Oriented X2 (not time)  EXTREMITIES: +right hip ecchymosis, no tenderness. No lower leg shortening, or external rotation noted. No LE edema, no calf tenderness  LYMPH: No lymphadenopathy noted  SKIN: No rashes or lesions    Consultant(s) Notes Reviewed:  [x ] YES  [ ] NO  Care Discussed with Consultants/Other Providers [ x] YES  [ ] NO    LABS:                        12.6   8.72  )-----------( 158      ( 02 Mar 2023 05:54 )             40.0     03-02    137  |  106  |  16  ----------------------------<  90  4.2   |  26  |  0.81    Ca    9.1      02 Mar 2023 05:54  Phos  3.2     03-02  Mg     2.4     03-02        CAPILLARY BLOOD GLUCOSE                RADIOLOGY & ADDITIONAL TESTS:  no new tests, waiting for xray hip    Imaging Personally Reviewed:  [ ] YES  [ ] NO

## 2023-03-03 NOTE — PROGRESS NOTE ADULT - ASSESSMENT
89 year old Pashto speaking female, from home, ambulating w/ a walker at baseline, w/ PMHx of Atrial Flutter (on Apixaban), Epilepsy, Anxiety, and Urinary incontinence, was brought into the ED by her family due to a fall. CT Head showed no intracranial hemorrhage and CT cervical spine did not show any fracture. EKG showed Atrial Flutter, similar to the previous one in Jan 2023.  Admitted to medicine for ambulatory dysfunction. Pt noted for right hip ecchymosis. pending xray of pelvis. PT rec [facility], awaiting acceptance.

## 2023-03-03 NOTE — PROGRESS NOTE ADULT - SUBJECTIVE AND OBJECTIVE BOX
Patient is a 89y old  Female who presents with a chief complaint of Multiple falls (02 Mar 2023 14:12)    OVERNIGHT EVENTS: no acute changes, vitals stable, sleeping and eating well. Currently waiting for xray hip.     REVIEW OF SYSTEMS: daughter at bedside assisted with translation  CONSTITUTIONAL: No fever, chills  ENMT:  No difficulty hearing, no change in vision  NECK: No pain or stiffness  RESPIRATORY: No cough, SOB  CARDIOVASCULAR: No chest pain, palpitations  GASTROINTESTINAL: No abdominal pain. No nausea, vomiting, or diarrhea  GENITOURINARY: No dysuria  NEUROLOGICAL: No HA  SKIN: No itching, burning, rashes, or lesions   LYMPH NODES: No enlarged glands  ENDOCRINE: No heat or cold intolerance; No hair loss  MUSCULOSKELETAL: +left shoulder pain. No joint pain or swelling; No muscle, back, or extremity pain  PSYCHIATRIC: No depression, anxiety  HEME/LYMPH: No easy bruising, or bleeding gums    T(C): 36.7 (03-03-23 @ 05:06), Max: 36.7 (03-03-23 @ 05:06)  HR: 81 (03-03-23 @ 05:06) (66 - 81)  BP: 116/64 (03-03-23 @ 05:06) (116/64 - 126/79)  RR: 18 (03-03-23 @ 05:06) (18 - 18)  SpO2: 96% (03-03-23 @ 05:06) (96% - 96%)  Wt(kg): --Vital Signs Last 24 Hrs  T(C): 36.7 (03 Mar 2023 05:06), Max: 36.7 (03 Mar 2023 05:06)  T(F): 98.1 (03 Mar 2023 05:06), Max: 98.1 (03 Mar 2023 05:06)  HR: 81 (03 Mar 2023 05:06) (66 - 81)  BP: 116/64 (03 Mar 2023 05:06) (116/64 - 126/79)  BP(mean): --  RR: 18 (03 Mar 2023 05:06) (18 - 18)  SpO2: 96% (03 Mar 2023 05:06) (96% - 96%)    Parameters below as of 03 Mar 2023 05:06  Patient On (Oxygen Delivery Method): room air    MEDICATIONS  (STANDING):  amLODIPine   Tablet 2.5 milliGRAM(s) Oral daily  apixaban 2.5 milliGRAM(s) Oral every 12 hours  lacosamide 100 milliGRAM(s) Oral two times a day  lidocaine   4% Patch 1 Patch Transdermal daily  metoprolol succinate ER 25 milliGRAM(s) Oral daily  oxybutynin XL 5 milliGRAM(s) Oral daily  pantoprazole    Tablet 40 milliGRAM(s) Oral before breakfast  sertraline 100 milliGRAM(s) Oral daily    MEDICATIONS  (PRN):  aluminum hydroxide/magnesium hydroxide/simethicone Suspension 30 milliLiter(s) Oral every 6 hours PRN Dyspepsia    PHYSICAL EXAM:  GENERAL: well-appearing, NAD  EYES: clear conjunctiva  ENMT: +right eye brow abrasion, nasal bridge abrasion with healing bruising. Moist mucous membranes  NECK: Supple, No JVD, Normal thyroid  CHEST/LUNG: Clear to auscultation bilaterally; No rales, rhonchi, wheezing, or rubs  HEART: S1, S2, Regular rate and rhythm  ABDOMEN: Soft, Nontender, Nondistended; Bowel sounds present  NEURO: Alert & Oriented X2 (not time)  EXTREMITIES: +right hip ecchymosis, no tenderness. No lower leg shortening, or external rotation noted. No LE edema, no calf tenderness  LYMPH: No lymphadenopathy noted  SKIN: No rashes or lesions    Consultant(s) Notes Reviewed:  [x ] YES  [ ] NO  Care Discussed with Consultants/Other Providers [ x] YES  [ ] NO    LABS:                        12.6   8.72  )-----------( 158      ( 02 Mar 2023 05:54 )             40.0     03-02    137  |  106  |  16  ----------------------------<  90  4.2   |  26  |  0.81    Ca    9.1      02 Mar 2023 05:54  Phos  3.2     03-02  Mg     2.4     03-02        CAPILLARY BLOOD GLUCOSE                RADIOLOGY & ADDITIONAL TESTS:  no new tests, waiting for xray hip    Imaging Personally Reviewed:  [ ] YES  [ ] NO

## 2023-03-03 NOTE — PROGRESS NOTE ADULT - PROBLEM SELECTOR PLAN 10
Will continue Eliquis 2.5 mg PO BID  Will continue PPI management. Will continue Oxybutinin 5 mg PO qd a1c 5.8   continue consistent carb diet  glucose controlled

## 2023-03-04 RX ADMIN — LIDOCAINE 1 PATCH: 4 CREAM TOPICAL at 12:11

## 2023-03-04 RX ADMIN — Medication 25 MILLIGRAM(S): at 05:02

## 2023-03-04 RX ADMIN — AMLODIPINE BESYLATE 2.5 MILLIGRAM(S): 2.5 TABLET ORAL at 05:02

## 2023-03-04 RX ADMIN — APIXABAN 2.5 MILLIGRAM(S): 2.5 TABLET, FILM COATED ORAL at 17:40

## 2023-03-04 RX ADMIN — PANTOPRAZOLE SODIUM 40 MILLIGRAM(S): 20 TABLET, DELAYED RELEASE ORAL at 06:06

## 2023-03-04 RX ADMIN — LACOSAMIDE 100 MILLIGRAM(S): 50 TABLET ORAL at 17:40

## 2023-03-04 RX ADMIN — LIDOCAINE 1 PATCH: 4 CREAM TOPICAL at 01:14

## 2023-03-04 RX ADMIN — LIDOCAINE 1 PATCH: 4 CREAM TOPICAL at 19:30

## 2023-03-04 RX ADMIN — Medication 5 MILLIGRAM(S): at 12:12

## 2023-03-04 RX ADMIN — SERTRALINE 100 MILLIGRAM(S): 25 TABLET, FILM COATED ORAL at 12:12

## 2023-03-04 RX ADMIN — LACOSAMIDE 100 MILLIGRAM(S): 50 TABLET ORAL at 05:01

## 2023-03-04 RX ADMIN — APIXABAN 2.5 MILLIGRAM(S): 2.5 TABLET, FILM COATED ORAL at 05:02

## 2023-03-04 NOTE — PROGRESS NOTE ADULT - SUBJECTIVE AND OBJECTIVE BOX
Patient is a 89y old  Female who presents with a chief complaint of Multiple falls (02 Mar 2023 14:12)    OVERNIGHT EVENTS: pt seen and examined    MEDICATIONS  (STANDING):  amLODIPine   Tablet 2.5 milliGRAM(s) Oral daily  apixaban 2.5 milliGRAM(s) Oral every 12 hours  lacosamide 100 milliGRAM(s) Oral two times a day  lidocaine   4% Patch 1 Patch Transdermal daily  metoprolol succinate ER 25 milliGRAM(s) Oral daily  oxybutynin XL 5 milliGRAM(s) Oral daily  pantoprazole    Tablet 40 milliGRAM(s) Oral before breakfast  sertraline 100 milliGRAM(s) Oral daily    MEDICATIONS  (PRN):  acetaminophen     Tablet .. 650 milliGRAM(s) Oral every 6 hours PRN Temp greater or equal to 38C (100.4F), Mild Pain (1 - 3)  aluminum hydroxide/magnesium hydroxide/simethicone Suspension 30 milliLiter(s) Oral every 6 hours PRN Dyspepsia    Vital Signs Last 24 Hrs  T(C): 36.4 (23 @ 22:18), Max: 36.7 (- @ 04:57)  T(F): 97.5 (- @ 22:18), Max: 98 (--23 @ 04:57)  HR: 71 (23 @ 22:18) (71 - 102)  BP: 123/73 (23 @ 22:18) (118/72 - 127/71)  BP(mean): --  RR: 18 (23 @ 22:18) (17 - 18)  SpO2: 96% (23 @ 22:18) (94% - 96%)        REVIEW OF SYSTEMS: daughter at bedside assisted with translation  CONSTITUTIONAL: No fever, chills  ENMT:  No difficulty hearing, no change in vision  NECK: No pain or stiffness  RESPIRATORY: No cough, SOB  CARDIOVASCULAR: No chest pain, palpitations  GASTROINTESTINAL: No abdominal pain. No nausea, vomiting, or diarrhea  GENITOURINARY: No dysuria  NEUROLOGICAL: No HA  SKIN: No itching, burning, rashes, or lesions   LYMPH NODES: No enlarged glands  ENDOCRINE: No heat or cold intolerance; No hair loss  MUSCULOSKELETAL: +left shoulder pain. No joint pain or swelling; No muscle, back, or extremity pain  PSYCHIATRIC: No depression, anxiety  HEME/LYMPH: No easy bruising, or bleeding gums    PHYSICAL EXAM:  GENERAL: well-appearing, NAD  EYES: clear conjunctiva  ENMT: +right eye brow abrasion, nasal bridge abrasion with healing bruising. Moist mucous membranes  NECK: Supple, No JVD, Normal thyroid  CHEST/LUNG: Clear to auscultation bilaterally; No rales, rhonchi, wheezing, or rubs  HEART: S1, S2, Regular rate and rhythm  ABDOMEN: Soft, Nontender, Nondistended; Bowel sounds present  NEURO: Alert & Oriented X2 (not time)  EXTREMITIES: +right hip ecchymosis, no tenderness. No lower leg shortening, or external rotation noted. No LE edema, no calf tenderness  LYMPH: No lymphadenopathy noted  SKIN: No rashes or lesions    Consultant(s) Notes Reviewed:  [x ] YES  [ ] NO  Care Discussed with Consultants/Other Providers [ x] YES  [ ] NO    LABS:        Creatinine Trend: 0.81 <--, 0.76 <--, 0.76 <--    Urine Studies:  Urinalysis Basic - ( 2023 08:00 )    Color: Yellow / Appearance: Clear / S.010 / pH:   Gluc:  / Ketone: Negative  / Bili: Negative / Urobili: Negative   Blood:  / Protein: 15 / Nitrite: Negative   Leuk Esterase: Negative / RBC: 5-10 /HPF / WBC 0-2 /HPF   Sq Epi:  / Non Sq Epi: Few /HPF / Bacteria: Few /HPF                    Imaging Personally Reviewed:  [ ] YES  [ ] NO

## 2023-03-04 NOTE — PROGRESS NOTE ADULT - ASSESSMENT
89 year old Kiswahili speaking female, from home, ambulating w/ a walker at baseline, w/ PMHx of Atrial Flutter (on Apixaban), Epilepsy, Anxiety, and Urinary incontinence, was brought into the ED by her family due to a fall. CT Head showed no intracranial hemorrhage and CT cervical spine did not show any fracture. EKG showed Atrial Flutter, similar to the previous one in Jan 2023.  Admitted to medicine for ambulatory dysfunction. Pt noted for right hip ecchymosis. pending xray of pelvis. PT rec EDUARDA, awaiting acceptance.

## 2023-03-05 RX ORDER — POLYETHYLENE GLYCOL 3350 17 G/17G
17 POWDER, FOR SOLUTION ORAL DAILY
Refills: 0 | Status: DISCONTINUED | OUTPATIENT
Start: 2023-03-05 | End: 2023-03-09

## 2023-03-05 RX ORDER — SENNA PLUS 8.6 MG/1
2 TABLET ORAL AT BEDTIME
Refills: 0 | Status: DISCONTINUED | OUTPATIENT
Start: 2023-03-05 | End: 2023-03-09

## 2023-03-05 RX ADMIN — APIXABAN 2.5 MILLIGRAM(S): 2.5 TABLET, FILM COATED ORAL at 17:21

## 2023-03-05 RX ADMIN — Medication 30 MILLILITER(S): at 13:49

## 2023-03-05 RX ADMIN — AMLODIPINE BESYLATE 2.5 MILLIGRAM(S): 2.5 TABLET ORAL at 05:56

## 2023-03-05 RX ADMIN — APIXABAN 2.5 MILLIGRAM(S): 2.5 TABLET, FILM COATED ORAL at 05:56

## 2023-03-05 RX ADMIN — Medication 25 MILLIGRAM(S): at 05:56

## 2023-03-05 RX ADMIN — Medication 10 MILLIGRAM(S): at 21:17

## 2023-03-05 RX ADMIN — LACOSAMIDE 100 MILLIGRAM(S): 50 TABLET ORAL at 05:56

## 2023-03-05 RX ADMIN — SENNA PLUS 2 TABLET(S): 8.6 TABLET ORAL at 22:17

## 2023-03-05 RX ADMIN — PANTOPRAZOLE SODIUM 40 MILLIGRAM(S): 20 TABLET, DELAYED RELEASE ORAL at 06:57

## 2023-03-05 RX ADMIN — LIDOCAINE 1 PATCH: 4 CREAM TOPICAL at 19:06

## 2023-03-05 RX ADMIN — LIDOCAINE 1 PATCH: 4 CREAM TOPICAL at 11:53

## 2023-03-05 RX ADMIN — LIDOCAINE 1 PATCH: 4 CREAM TOPICAL at 00:15

## 2023-03-05 RX ADMIN — LACOSAMIDE 100 MILLIGRAM(S): 50 TABLET ORAL at 17:21

## 2023-03-05 RX ADMIN — Medication 5 MILLIGRAM(S): at 11:52

## 2023-03-05 RX ADMIN — SERTRALINE 100 MILLIGRAM(S): 25 TABLET, FILM COATED ORAL at 11:52

## 2023-03-05 RX ADMIN — LIDOCAINE 1 PATCH: 4 CREAM TOPICAL at 23:04

## 2023-03-05 NOTE — PROGRESS NOTE ADULT - ASSESSMENT
89 year old Icelandic speaking female, from home, ambulating w/ a walker at baseline, w/ PMHx of Atrial Flutter (on Apixaban), Epilepsy, Anxiety, and Urinary incontinence, was brought into the ED by her family due to a fall. CT Head showed no intracranial hemorrhage and CT cervical spine did not show any fracture. EKG showed Atrial Flutter, similar to the previous one in Jan 2023.  Admitted to medicine for ambulatory dysfunction. Pt noted for right hip ecchymosis. pending xray of pelvis. PT rec EDUARDA, awaiting acceptance.

## 2023-03-05 NOTE — PROGRESS NOTE ADULT - SUBJECTIVE AND OBJECTIVE BOX
Patient is a 89y old  Female who presents with a chief complaint of Multiple falls (02 Mar 2023 14:12)    OVERNIGHT EVENTS: pt seen and examined    MEDICATIONS  (STANDING):  amLODIPine   Tablet 2.5 milliGRAM(s) Oral daily  apixaban 2.5 milliGRAM(s) Oral every 12 hours  lacosamide 100 milliGRAM(s) Oral two times a day  lidocaine   4% Patch 1 Patch Transdermal daily  metoprolol succinate ER 25 milliGRAM(s) Oral daily  oxybutynin XL 5 milliGRAM(s) Oral daily  pantoprazole    Tablet 40 milliGRAM(s) Oral before breakfast  polyethylene glycol 3350 17 Gram(s) Oral daily  senna 2 Tablet(s) Oral at bedtime  sertraline 100 milliGRAM(s) Oral daily    MEDICATIONS  (PRN):  acetaminophen     Tablet .. 650 milliGRAM(s) Oral every 6 hours PRN Temp greater or equal to 38C (100.4F), Mild Pain (1 - 3)  aluminum hydroxide/magnesium hydroxide/simethicone Suspension 30 milliLiter(s) Oral every 6 hours PRN Dyspepsia    Vital Signs Last 24 Hrs  T(C): 36.8 (23 @ 21:53), Max: 37.3 (-23 @ 04:57)  T(F): 98.3 (-- @ 21:53), Max: 99.1 (--23 @ 04:57)  HR: 73 (23 @ 21:53) (73 - 90)  BP: 105/62 (23 @ 21:53) (105/62 - 145/86)  BP(mean): --  RR: 18 (23 @ 21:53) (18 - 18)  SpO2: 98% (23 @ 21:53) (95% - 98%)          REVIEW OF SYSTEMS: daughter at bedside assisted with translation  CONSTITUTIONAL: No fever, chills  ENMT:  No difficulty hearing, no change in vision  NECK: No pain or stiffness  RESPIRATORY: No cough, SOB  CARDIOVASCULAR: No chest pain, palpitations  GASTROINTESTINAL: No abdominal pain. No nausea, vomiting, or diarrhea  GENITOURINARY: No dysuria  NEUROLOGICAL: No HA  SKIN: No itching, burning, rashes, or lesions   LYMPH NODES: No enlarged glands  ENDOCRINE: No heat or cold intolerance; No hair loss  MUSCULOSKELETAL: +left shoulder pain. No joint pain or swelling; No muscle, back, or extremity pain  PSYCHIATRIC: No depression, anxiety  HEME/LYMPH: No easy bruising, or bleeding gums    PHYSICAL EXAM:  GENERAL: well-appearing, NAD  EYES: clear conjunctiva  ENMT: +right eye brow abrasion, nasal bridge abrasion with healing bruising. Moist mucous membranes  NECK: Supple, No JVD, Normal thyroid  CHEST/LUNG: Clear to auscultation bilaterally; No rales, rhonchi, wheezing, or rubs  HEART: S1, S2, Regular rate and rhythm  ABDOMEN: Soft, Nontender, Nondistended; Bowel sounds present  NEURO: Alert & Oriented X2 (not time)  EXTREMITIES: +right hip ecchymosis, no tenderness. No lower leg shortening, or external rotation noted. No LE edema, no calf tenderness  LYMPH: No lymphadenopathy noted  SKIN: No rashes or lesions    Consultant(s) Notes Reviewed:  [x ] YES  [ ] NO  Care Discussed with Consultants/Other Providers [ x] YES  [ ] NO    LABS:        Creatinine Trend: 0.81 <--, 0.76 <--, 0.76 <--    Urine Studies:  Urinalysis Basic - ( 2023 08:00 )    Color: Yellow / Appearance: Clear / S.010 / pH:   Gluc:  / Ketone: Negative  / Bili: Negative / Urobili: Negative   Blood:  / Protein: 15 / Nitrite: Negative   Leuk Esterase: Negative / RBC: 5-10 /HPF / WBC 0-2 /HPF   Sq Epi:  / Non Sq Epi: Few /HPF / Bacteria: Few /HPF                          Imaging Personally Reviewed:  [ ] YES  [ ] NO

## 2023-03-06 RX ORDER — LANOLIN ALCOHOL/MO/W.PET/CERES
3 CREAM (GRAM) TOPICAL AT BEDTIME
Refills: 0 | Status: DISCONTINUED | OUTPATIENT
Start: 2023-03-06 | End: 2023-03-09

## 2023-03-06 RX ADMIN — POLYETHYLENE GLYCOL 3350 17 GRAM(S): 17 POWDER, FOR SOLUTION ORAL at 12:38

## 2023-03-06 RX ADMIN — LACOSAMIDE 100 MILLIGRAM(S): 50 TABLET ORAL at 06:30

## 2023-03-06 RX ADMIN — Medication 25 MILLIGRAM(S): at 06:30

## 2023-03-06 RX ADMIN — APIXABAN 2.5 MILLIGRAM(S): 2.5 TABLET, FILM COATED ORAL at 06:31

## 2023-03-06 RX ADMIN — SERTRALINE 100 MILLIGRAM(S): 25 TABLET, FILM COATED ORAL at 12:38

## 2023-03-06 RX ADMIN — SENNA PLUS 2 TABLET(S): 8.6 TABLET ORAL at 21:38

## 2023-03-06 RX ADMIN — APIXABAN 2.5 MILLIGRAM(S): 2.5 TABLET, FILM COATED ORAL at 17:39

## 2023-03-06 RX ADMIN — Medication 3 MILLIGRAM(S): at 01:44

## 2023-03-06 RX ADMIN — LIDOCAINE 1 PATCH: 4 CREAM TOPICAL at 19:51

## 2023-03-06 RX ADMIN — AMLODIPINE BESYLATE 2.5 MILLIGRAM(S): 2.5 TABLET ORAL at 06:30

## 2023-03-06 RX ADMIN — Medication 5 MILLIGRAM(S): at 12:38

## 2023-03-06 RX ADMIN — LIDOCAINE 1 PATCH: 4 CREAM TOPICAL at 12:38

## 2023-03-06 RX ADMIN — PANTOPRAZOLE SODIUM 40 MILLIGRAM(S): 20 TABLET, DELAYED RELEASE ORAL at 06:30

## 2023-03-06 RX ADMIN — LACOSAMIDE 100 MILLIGRAM(S): 50 TABLET ORAL at 17:40

## 2023-03-06 NOTE — PROGRESS NOTE ADULT - SUBJECTIVE AND OBJECTIVE BOX
Patient is a 89y old  Female who presents with a chief complaint of Multiple falls (05 Mar 2023 11:45)      INTERVAL HPI/OVERNIGHT EVENTS: pt seen and examined      MEDICATIONS  (STANDING):  amLODIPine   Tablet 2.5 milliGRAM(s) Oral daily  apixaban 2.5 milliGRAM(s) Oral every 12 hours  lacosamide 100 milliGRAM(s) Oral two times a day  lidocaine   4% Patch 1 Patch Transdermal daily  metoprolol succinate ER 25 milliGRAM(s) Oral daily  oxybutynin XL 5 milliGRAM(s) Oral daily  pantoprazole    Tablet 40 milliGRAM(s) Oral before breakfast  polyethylene glycol 3350 17 Gram(s) Oral daily  senna 2 Tablet(s) Oral at bedtime  sertraline 100 milliGRAM(s) Oral daily    MEDICATIONS  (PRN):  acetaminophen     Tablet .. 650 milliGRAM(s) Oral every 6 hours PRN Temp greater or equal to 38C (100.4F), Mild Pain (1 - 3)  aluminum hydroxide/magnesium hydroxide/simethicone Suspension 30 milliLiter(s) Oral every 6 hours PRN Dyspepsia  melatonin 3 milliGRAM(s) Oral at bedtime PRN Sleep      __________________________________________________  REVIEW OF SYSTEMS:    CONSTITUTIONAL: No fever,   RESPIRATORY: No cough; No shortness of breath  CARDIOVASCULAR: No chest pain, no palpitations  GASTROINTESTINAL: No pain. No nausea or vomiting; No diarrhea   NEUROLOGICAL: No headache or numbness, no tremors  MUSCULOSKELETAL: No joint pain, no muscle pain  GENITOURINARY: no dysuria, no frequency, no hesitancy        Vital Signs Last 24 Hrs  T(C): 36.4 (06 Mar 2023 05:22), Max: 36.8 (05 Mar 2023 21:53)  T(F): 97.5 (06 Mar 2023 05:22), Max: 98.3 (05 Mar 2023 21:53)  HR: 73 (06 Mar 2023 05:22) (73 - 90)  BP: 126/71 (06 Mar 2023 05:22) (105/62 - 145/86)  BP(mean): --  RR: 19 (06 Mar 2023 05:22) (18 - 19)  SpO2: 97% (06 Mar 2023 05:22) (96% - 98%)    Parameters below as of 06 Mar 2023 05:22  Patient On (Oxygen Delivery Method): room air        ________________________________________________  PHYSICAL EXAM:  GENERAL: NAD  CHEST/LUNG: dec breath sounds at bases  ABDOMEN: Soft, Nontender, Nondistended; Bowel sounds present  EXTREMITIES: no cyanosis; no edema; no calf tenderness  SKIN: warm and dry; no rash  NERVOUS SYSTEM:  Awake and alert;  _________________________________________________  LABS:              CAPILLARY BLOOD GLUCOSE            RADIOLOGY & ADDITIONAL TESTS:    Imaging Personally Reviewed:  YES/NO    Consultant(s) Notes Reviewed:   YES/ No    Care Discussed with Consultants :     Plan of care was discussed with patient and /or primary care giver; all questions and concerns were addressed and care was aligned with patient's wishes.

## 2023-03-06 NOTE — PROGRESS NOTE ADULT - SUBJECTIVE AND OBJECTIVE BOX
NP Note discussed with  primary attending    Patient is a 89y old  Female who presents with a chief complaint of Multiple falls (05 Mar 2023 11:45)      INTERVAL HPI/OVERNIGHT EVENTS: no new complaints    MEDICATIONS  (STANDING):  amLODIPine   Tablet 2.5 milliGRAM(s) Oral daily  apixaban 2.5 milliGRAM(s) Oral every 12 hours  lacosamide 100 milliGRAM(s) Oral two times a day  lidocaine   4% Patch 1 Patch Transdermal daily  metoprolol succinate ER 25 milliGRAM(s) Oral daily  oxybutynin XL 5 milliGRAM(s) Oral daily  pantoprazole    Tablet 40 milliGRAM(s) Oral before breakfast  polyethylene glycol 3350 17 Gram(s) Oral daily  senna 2 Tablet(s) Oral at bedtime  sertraline 100 milliGRAM(s) Oral daily    MEDICATIONS  (PRN):  acetaminophen     Tablet .. 650 milliGRAM(s) Oral every 6 hours PRN Temp greater or equal to 38C (100.4F), Mild Pain (1 - 3)  aluminum hydroxide/magnesium hydroxide/simethicone Suspension 30 milliLiter(s) Oral every 6 hours PRN Dyspepsia  melatonin 3 milliGRAM(s) Oral at bedtime PRN Sleep      __________________________________________________  REVIEW OF SYSTEMS:    CONSTITUTIONAL: No fever,   RESPIRATORY: No cough; No shortness of breath  CARDIOVASCULAR: No chest pain, no palpitations  GASTROINTESTINAL: No pain. No nausea or vomiting; No diarrhea   NEUROLOGICAL: No headache or numbness, no tremors  MUSCULOSKELETAL: No joint pain, no muscle pain  GENITOURINARY: no dysuria, no frequency, no hesitancy        Vital Signs Last 24 Hrs  T(C): 36.4 (06 Mar 2023 05:22), Max: 36.8 (05 Mar 2023 21:53)  T(F): 97.5 (06 Mar 2023 05:22), Max: 98.3 (05 Mar 2023 21:53)  HR: 73 (06 Mar 2023 05:22) (73 - 90)  BP: 126/71 (06 Mar 2023 05:22) (105/62 - 145/86)  BP(mean): --  RR: 19 (06 Mar 2023 05:22) (18 - 19)  SpO2: 97% (06 Mar 2023 05:22) (96% - 98%)    Parameters below as of 06 Mar 2023 05:22  Patient On (Oxygen Delivery Method): room air        ________________________________________________  PHYSICAL EXAM:  GENERAL: NAD  CHEST/LUNG: Clear to ausculitation bilaterally   ABDOMEN: Soft, Nontender, Nondistended; Bowel sounds present  EXTREMITIES: no cyanosis; no edema; no calf tenderness  SKIN: warm and dry; no rash  NERVOUS SYSTEM:  Awake and alert; Oriented  to place, person and disoriented to  time ; no new deficits    _________________________________________________  LABS:              CAPILLARY BLOOD GLUCOSE            RADIOLOGY & ADDITIONAL TESTS:    Imaging Personally Reviewed:  YES/NO    Consultant(s) Notes Reviewed:   YES/ No    Care Discussed with Consultants :     Plan of care was discussed with patient and /or primary care giver; all questions and concerns were addressed and care was aligned with patient's wishes.

## 2023-03-06 NOTE — PROGRESS NOTE ADULT - PROBLEM SELECTOR PLAN 8
continue Sertraline 100 mg PO qd  per daughter has been on it for 20 years, can have some effect on dizziness

## 2023-03-06 NOTE — PROGRESS NOTE ADULT - ASSESSMENT
89 year old Faroese speaking female, from home, ambulating w/ a walker at baseline, w/ PMHx of Atrial Flutter (on Apixaban), Epilepsy, Anxiety, and Urinary incontinence, was brought into the ED by her family due to a fall. CT Head showed no intracranial hemorrhage and CT cervical spine did not show any fracture. EKG showed Atrial Flutter, similar to the previous one in Jan 2023.  Admitted to medicine for ambulatory dysfunction. Pt noted for right hip ecchymosis. pending xray of pelvis. PT rec EDUARDA, awaiting acceptance.   Pt seen at bedside, NAD, no new complaints.

## 2023-03-06 NOTE — PROGRESS NOTE ADULT - ASSESSMENT
89 year old Telugu speaking female, from home, ambulating w/ a walker at baseline, w/ PMHx of Atrial Flutter (on Apixaban), Epilepsy, Anxiety, and Urinary incontinence, was brought into the ED by her family due to a fall. CT Head showed no intracranial hemorrhage and CT cervical spine did not show any fracture. EKG showed Atrial Flutter, similar to the previous one in Jan 2023.  Admitted to medicine for ambulatory dysfunction. Pt noted for right hip ecchymosis. pending xray of pelvis. PT rec EDUARDA, awaiting acceptance.   Pt seen at bedside, NAD, no new complaints.

## 2023-03-07 LAB
ANION GAP SERPL CALC-SCNC: 7 MMOL/L — SIGNIFICANT CHANGE UP (ref 5–17)
BUN SERPL-MCNC: 19 MG/DL — HIGH (ref 7–18)
CALCIUM SERPL-MCNC: 9.4 MG/DL — SIGNIFICANT CHANGE UP (ref 8.4–10.5)
CHLORIDE SERPL-SCNC: 103 MMOL/L — SIGNIFICANT CHANGE UP (ref 96–108)
CO2 SERPL-SCNC: 27 MMOL/L — SIGNIFICANT CHANGE UP (ref 22–31)
CREAT SERPL-MCNC: 0.81 MG/DL — SIGNIFICANT CHANGE UP (ref 0.5–1.3)
EGFR: 67 ML/MIN/1.73M2 — SIGNIFICANT CHANGE UP
GLUCOSE SERPL-MCNC: 98 MG/DL — SIGNIFICANT CHANGE UP (ref 70–99)
HCT VFR BLD CALC: 43 % — SIGNIFICANT CHANGE UP (ref 34.5–45)
HGB BLD-MCNC: 13.6 G/DL — SIGNIFICANT CHANGE UP (ref 11.5–15.5)
MCHC RBC-ENTMCNC: 26.2 PG — LOW (ref 27–34)
MCHC RBC-ENTMCNC: 31.6 GM/DL — LOW (ref 32–36)
MCV RBC AUTO: 82.7 FL — SIGNIFICANT CHANGE UP (ref 80–100)
NRBC # BLD: 0 /100 WBCS — SIGNIFICANT CHANGE UP (ref 0–0)
PLATELET # BLD AUTO: 204 K/UL — SIGNIFICANT CHANGE UP (ref 150–400)
POTASSIUM SERPL-MCNC: 4.6 MMOL/L — SIGNIFICANT CHANGE UP (ref 3.5–5.3)
POTASSIUM SERPL-SCNC: 4.6 MMOL/L — SIGNIFICANT CHANGE UP (ref 3.5–5.3)
RBC # BLD: 5.2 M/UL — SIGNIFICANT CHANGE UP (ref 3.8–5.2)
RBC # FLD: 14.6 % — HIGH (ref 10.3–14.5)
SARS-COV-2 RNA SPEC QL NAA+PROBE: SIGNIFICANT CHANGE UP
SODIUM SERPL-SCNC: 137 MMOL/L — SIGNIFICANT CHANGE UP (ref 135–145)
WBC # BLD: 6.52 K/UL — SIGNIFICANT CHANGE UP (ref 3.8–10.5)
WBC # FLD AUTO: 6.52 K/UL — SIGNIFICANT CHANGE UP (ref 3.8–10.5)

## 2023-03-07 RX ADMIN — APIXABAN 2.5 MILLIGRAM(S): 2.5 TABLET, FILM COATED ORAL at 05:48

## 2023-03-07 RX ADMIN — Medication 5 MILLIGRAM(S): at 12:45

## 2023-03-07 RX ADMIN — LIDOCAINE 1 PATCH: 4 CREAM TOPICAL at 12:45

## 2023-03-07 RX ADMIN — LIDOCAINE 1 PATCH: 4 CREAM TOPICAL at 02:15

## 2023-03-07 RX ADMIN — PANTOPRAZOLE SODIUM 40 MILLIGRAM(S): 20 TABLET, DELAYED RELEASE ORAL at 05:49

## 2023-03-07 RX ADMIN — APIXABAN 2.5 MILLIGRAM(S): 2.5 TABLET, FILM COATED ORAL at 17:26

## 2023-03-07 RX ADMIN — Medication 25 MILLIGRAM(S): at 05:47

## 2023-03-07 RX ADMIN — AMLODIPINE BESYLATE 2.5 MILLIGRAM(S): 2.5 TABLET ORAL at 05:48

## 2023-03-07 RX ADMIN — POLYETHYLENE GLYCOL 3350 17 GRAM(S): 17 POWDER, FOR SOLUTION ORAL at 12:46

## 2023-03-07 RX ADMIN — LACOSAMIDE 100 MILLIGRAM(S): 50 TABLET ORAL at 17:26

## 2023-03-07 RX ADMIN — SENNA PLUS 2 TABLET(S): 8.6 TABLET ORAL at 22:37

## 2023-03-07 RX ADMIN — LACOSAMIDE 100 MILLIGRAM(S): 50 TABLET ORAL at 05:49

## 2023-03-07 RX ADMIN — SERTRALINE 100 MILLIGRAM(S): 25 TABLET, FILM COATED ORAL at 12:45

## 2023-03-07 RX ADMIN — LIDOCAINE 1 PATCH: 4 CREAM TOPICAL at 20:13

## 2023-03-07 NOTE — PROGRESS NOTE ADULT - PROBLEM SELECTOR PLAN 5
H/o Atrial flutter on Eliquis & Toprol   - S/p EKG showed Atrial Flutter w/ variable AV block. Similar to the one in Jan 2023.   - Stable  - C/w Eliquis & Toprol [Negative] : Genitourinary

## 2023-03-07 NOTE — DIETITIAN INITIAL EVALUATION ADULT - PERTINENT LABORATORY DATA
03-07    137  |  103  |  19<H>  ----------------------------<  98  4.6   |  27  |  0.81    Ca    9.4      07 Mar 2023 05:56    A1C with Estimated Average Glucose Result: 5.8 % (03-01-23 @ 06:04)  A1C with Estimated Average Glucose Result: 5.6 % (01-13-23 @ 05:55)

## 2023-03-07 NOTE — PROGRESS NOTE ADULT - ASSESSMENT
89 year old, Anguillan speaking female, from home, ambulates w/ walker at baseline, w/ PMH of Atrial Flutter (on Apixaban), Epilepsy, Anxiety, and Urinary incontinence.  Presented w/ family due to a fall.  Admitted for Ambulatory Dysfunction    CT Head showed no intracranial hemorrhage and CT cervical spine did not show any fracture. EKG showed Atrial Flutter, similar to the previous one in Jan 2023.      Right hip ecchymosis-Pending xray of hip/pelvis.    PT recommended EDUARDA.   Planning to DC to [facility].  Awaiting Auth.

## 2023-03-07 NOTE — PROGRESS NOTE ADULT - SUBJECTIVE AND OBJECTIVE BOX
NP Note discussed with  primary attending    Patient is a 89y old  Female who presents with a chief complaint of Laceration without foreign body of other part of head, initial encounter     (07 Mar 2023 08:58)      INTERVAL HPI/OVERNIGHT EVENTS: PCA at bedside translated Wolof.  Pt states, "right now compared to before I have nothing."  Denies HA, dizziness, SOB, CP, or abdominal pain.  Reports h/o left shoulder arthritis described as "bone to bone pain, friction."  Reports, "I can not walk too much b/c I loose my balance."  No new complaints or concerns.     MEDICATIONS  (STANDING):  amLODIPine   Tablet 2.5 milliGRAM(s) Oral daily  apixaban 2.5 milliGRAM(s) Oral every 12 hours  lacosamide 100 milliGRAM(s) Oral two times a day  lidocaine   4% Patch 1 Patch Transdermal daily  metoprolol succinate ER 25 milliGRAM(s) Oral daily  oxybutynin XL 5 milliGRAM(s) Oral daily  pantoprazole    Tablet 40 milliGRAM(s) Oral before breakfast  polyethylene glycol 3350 17 Gram(s) Oral daily  senna 2 Tablet(s) Oral at bedtime  sertraline 100 milliGRAM(s) Oral daily    MEDICATIONS  (PRN):  acetaminophen     Tablet .. 650 milliGRAM(s) Oral every 6 hours PRN Temp greater or equal to 38C (100.4F), Mild Pain (1 - 3)  aluminum hydroxide/magnesium hydroxide/simethicone Suspension 30 milliLiter(s) Oral every 6 hours PRN Dyspepsia  melatonin 3 milliGRAM(s) Oral at bedtime PRN Sleep      __________________________________________________  REVIEW OF SYSTEMS:    CONSTITUTIONAL: No fever  EYES: No acute visual disturbances  NECK: No pain or stiffness  RESPIRATORY: No cough; No shortness of breath  CARDIOVASCULAR: No chest pain, no palpitations  GASTROINTESTINAL: No pain. No nausea or vomiting.  No diarrhea   NEUROLOGICAL: No headache or numbness, no tremors  MUSCULOSKELETAL: (+) Chronic left shoulder arthritis relieved by pain mgmt   GENITOURINARY: No dysuria, no frequency, no hesitancy  PSYCHIATRY: No depression , no anxiety  ALL OTHER  ROS negative        Vital Signs Last 24 Hrs  T(C): 36.4 (07 Mar 2023 05:39), Max: 37.3 (06 Mar 2023 20:29)  T(F): 97.5 (07 Mar 2023 05:39), Max: 99.1 (06 Mar 2023 20:29)  HR: 99 (07 Mar 2023 05:39) (84 - 99)  BP: 98/79 (07 Mar 2023 05:39) (98/79 - 120/70)  RR: 18 (07 Mar 2023 05:39) (18 - 18)  SpO2: 96% (07 Mar 2023 05:39) (95% - 97%)    Parameters below as of 07 Mar 2023 05:39  Patient On (Oxygen Delivery Method): room air        ________________________________________________  PHYSICAL EXAM:  GENERAL: NAD  HEENT: Normocephalic;  conjunctivae and sclerae clear; moist mucous membranes   NECK : Supple  CHEST/LUNG: Clear to auscultation bilaterally with good air entry   HEART: S1 S2  regular; no murmurs, gallops or rubs  ABDOMEN: Soft, Nontender, Nondistended; Bowel sounds present x 4 quad  EXTREMITIES: No cyanosis; no edema; no calf tenderness  SKIN: Warm and dry; (+) Right hip ecchymosis.  (+) B/l c/w PVD.  (+) B/l Ant. shin eschars.    NERVOUS SYSTEM:  Awake and alert; Oriented to place & person, not time; no new deficits    _________________________________________________  LABS:                        13.6   6.52  )-----------( 204      ( 07 Mar 2023 05:56 )             43.0     03-07    137  |  103  |  19<H>  ----------------------------<  98  4.6   |  27  |  0.81    Ca    9.4      07 Mar 2023 05:56          CAPILLARY BLOOD GLUCOSE            RADIOLOGY & ADDITIONAL TESTS:    Imaging Personally Reviewed:  YES    Consultant(s) Notes Reviewed:   YES    Care Discussed with Consultants :     Plan of care was discussed with patient and /or primary care giver; all questions and concerns were addressed and care was aligned with patient's wishes.

## 2023-03-07 NOTE — PROGRESS NOTE ADULT - PROBLEM SELECTOR PLAN 1
- P/w Fall d/t unsteady gait fall and right hip ecchymosis  - S/p CT Head & CT Cervical Spine negative    - PT recommended EDUARDA  - Maintain fall precautions  - Hip/Pelvic xray still pending-f/u results

## 2023-03-07 NOTE — DIETITIAN INITIAL EVALUATION ADULT - OTHER INFO
Pt lives home with family PTA, alert, verbally responsive, confused per chart; Limited intake/wt change history data available at present; allergy to Pineapple; observed breakfast--90% intake today, 26 to 50% intake at times per chart; EtlJ4I=5.8, h/o Pre-DM, pt not a candidate for diet education due to Advanced age, quality of life; Pending discharge planning to skilled nursing facility for rehab when medically ready

## 2023-03-07 NOTE — PROGRESS NOTE ADULT - PROBLEM SELECTOR PLAN 7
- TSH 2.64  - CT cervical spine showed thyroid nodules including a peripherally calcified 1.6 cm right lobe nodule  - Pt to f/u outpt for Thyroid US

## 2023-03-07 NOTE — DIETITIAN INITIAL EVALUATION ADULT - PERTINENT MEDS FT
MEDICATIONS  (STANDING):  amLODIPine   Tablet 2.5 milliGRAM(s) Oral daily  apixaban 2.5 milliGRAM(s) Oral every 12 hours  lacosamide 100 milliGRAM(s) Oral two times a day  lidocaine   4% Patch 1 Patch Transdermal daily  metoprolol succinate ER 25 milliGRAM(s) Oral daily  oxybutynin XL 5 milliGRAM(s) Oral daily  pantoprazole    Tablet 40 milliGRAM(s) Oral before breakfast  polyethylene glycol 3350 17 Gram(s) Oral daily  senna 2 Tablet(s) Oral at bedtime  sertraline 100 milliGRAM(s) Oral daily    MEDICATIONS  (PRN):  acetaminophen     Tablet .. 650 milliGRAM(s) Oral every 6 hours PRN Temp greater or equal to 38C (100.4F), Mild Pain (1 - 3)  aluminum hydroxide/magnesium hydroxide/simethicone Suspension 30 milliLiter(s) Oral every 6 hours PRN Dyspepsia  melatonin 3 milliGRAM(s) Oral at bedtime PRN Sleep

## 2023-03-07 NOTE — PROGRESS NOTE ADULT - SUBJECTIVE AND OBJECTIVE BOX
Patient is a 89y old  Female who presents with a chief complaint of Laceration without foreign body of other part of head, initial encounter     (07 Mar 2023 08:58)      INTERVAL HPI/OVERNIGHT EVENTS: PCA at bedside translated [place].  Pt states, "right now compared to before I have nothing."  Denies HA, dizziness, SOB, CP, or abdominal pain.  Reports h/o left shoulder arthritis described as "bone to bone pain, friction."  Reports, "I can not walk too much b/c I loose my balance."  No new complaints or concerns.     MEDICATIONS  (STANDING):  amLODIPine   Tablet 2.5 milliGRAM(s) Oral daily  apixaban 2.5 milliGRAM(s) Oral every 12 hours  lacosamide 100 milliGRAM(s) Oral two times a day  lidocaine   4% Patch 1 Patch Transdermal daily  metoprolol succinate ER 25 milliGRAM(s) Oral daily  oxybutynin XL 5 milliGRAM(s) Oral daily  pantoprazole    Tablet 40 milliGRAM(s) Oral before breakfast  polyethylene glycol 3350 17 Gram(s) Oral daily  senna 2 Tablet(s) Oral at bedtime  sertraline 100 milliGRAM(s) Oral daily    MEDICATIONS  (PRN):  acetaminophen     Tablet .. 650 milliGRAM(s) Oral every 6 hours PRN Temp greater or equal to 38C (100.4F), Mild Pain (1 - 3)  aluminum hydroxide/magnesium hydroxide/simethicone Suspension 30 milliLiter(s) Oral every 6 hours PRN Dyspepsia  melatonin 3 milliGRAM(s) Oral at bedtime PRN Sleep      __________________________________________________  REVIEW OF SYSTEMS:    CONSTITUTIONAL: No fever  EYES: No acute visual disturbances  NECK: No pain or stiffness  RESPIRATORY: No cough; No shortness of breath  CARDIOVASCULAR: No chest pain, no palpitations  GASTROINTESTINAL: No pain. No nausea or vomiting.  No diarrhea   NEUROLOGICAL: No headache or numbness, no tremors  MUSCULOSKELETAL: (+) Chronic left shoulder arthritis relieved by pain mgmt   GENITOURINARY: No dysuria, no frequency, no hesitancy  PSYCHIATRY: No depression , no anxiety  ALL OTHER  ROS negative        Vital Signs Last 24 Hrs  T(C): 36.4 (07 Mar 2023 05:39), Max: 37.3 (06 Mar 2023 20:29)  T(F): 97.5 (07 Mar 2023 05:39), Max: 99.1 (06 Mar 2023 20:29)  HR: 99 (07 Mar 2023 05:39) (84 - 99)  BP: 98/79 (07 Mar 2023 05:39) (98/79 - 120/70)  RR: 18 (07 Mar 2023 05:39) (18 - 18)  SpO2: 96% (07 Mar 2023 05:39) (95% - 97%)    Parameters below as of 07 Mar 2023 05:39  Patient On (Oxygen Delivery Method): room air        ________________________________________________  PHYSICAL EXAM:  GENERAL: NAD  HEENT: Normocephalic;  conjunctivae and sclerae clear; moist mucous membranes   NECK : Supple  CHEST/LUNG: dec breath sounds at bases  HEART: S1 S2  regular; no murmurs, gallops or rubs  ABDOMEN: Soft, Nontender, Nondistended; Bowel sounds present x 4 quad  EXTREMITIES: No cyanosis; no edema; no calf tenderness  SKIN: Warm and dry; (+) Right hip ecchymosis.  (+) B/l c/w PVD.  (+) B/l Ant. shin eschars.    NERVOUS SYSTEM:  Awake and alert;    _________________________________________________  LABS:                        13.6   6.52  )-----------( 204      ( 07 Mar 2023 05:56 )             43.0     03-07    137  |  103  |  19<H>  ----------------------------<  98  4.6   |  27  |  0.81    Ca    9.4      07 Mar 2023 05:56          CAPILLARY BLOOD GLUCOSE            RADIOLOGY & ADDITIONAL TESTS:    Imaging Personally Reviewed:  YES    Consultant(s) Notes Reviewed:   YES    Care Discussed with Consultants :     Plan of care was discussed with patient and /or primary care giver; all questions and concerns were addressed and care was aligned with patient's wishes.

## 2023-03-07 NOTE — DIETITIAN INITIAL EVALUATION ADULT - FACTORS AFF FOOD INTAKE
acute on chronic comorbidities/change in mental status/Buddhist/ethnic/cultural/personal food preferences

## 2023-03-07 NOTE — PROGRESS NOTE ADULT - ASSESSMENT
89 year old, Greek speaking female, from home, ambulates w/ walker at baseline, w/ PMH of Atrial Flutter (on Apixaban), Epilepsy, Anxiety, and Urinary incontinence.  Presented w/ family due to a fall.  Admitted for Ambulatory Dysfunction    CT Head showed no intracranial hemorrhage and CT cervical spine did not show any fracture. EKG showed Atrial Flutter, similar to the previous one in Jan 2023.      Right hip ecchymosis-Pending xray of hip/pelvis.    PT recommended EDUARDA.   Planning to DC to [facility].  Awaiting Auth.

## 2023-03-07 NOTE — DIETITIAN INITIAL EVALUATION ADULT - DIET TYPE
May consider oral nutritional supplement if persistently decreased intake as medically feasible/soft and bite-sized

## 2023-03-07 NOTE — PROGRESS NOTE ADULT - PROBLEM SELECTOR PLAN 5
H/o Atrial flutter on Eliquis & Toprol   - S/p EKG showed Atrial Flutter w/ variable AV block. Similar to the one in Jan 2023.   - Stable  - C/w Eliquis & Toprol

## 2023-03-08 RX ADMIN — LACOSAMIDE 100 MILLIGRAM(S): 50 TABLET ORAL at 05:53

## 2023-03-08 RX ADMIN — LIDOCAINE 1 PATCH: 4 CREAM TOPICAL at 19:30

## 2023-03-08 RX ADMIN — LIDOCAINE 1 PATCH: 4 CREAM TOPICAL at 00:10

## 2023-03-08 RX ADMIN — POLYETHYLENE GLYCOL 3350 17 GRAM(S): 17 POWDER, FOR SOLUTION ORAL at 12:20

## 2023-03-08 RX ADMIN — APIXABAN 2.5 MILLIGRAM(S): 2.5 TABLET, FILM COATED ORAL at 05:53

## 2023-03-08 RX ADMIN — Medication 25 MILLIGRAM(S): at 05:53

## 2023-03-08 RX ADMIN — LACOSAMIDE 100 MILLIGRAM(S): 50 TABLET ORAL at 17:53

## 2023-03-08 RX ADMIN — LIDOCAINE 1 PATCH: 4 CREAM TOPICAL at 12:19

## 2023-03-08 RX ADMIN — SENNA PLUS 2 TABLET(S): 8.6 TABLET ORAL at 21:04

## 2023-03-08 RX ADMIN — SERTRALINE 100 MILLIGRAM(S): 25 TABLET, FILM COATED ORAL at 12:19

## 2023-03-08 RX ADMIN — APIXABAN 2.5 MILLIGRAM(S): 2.5 TABLET, FILM COATED ORAL at 17:53

## 2023-03-08 RX ADMIN — AMLODIPINE BESYLATE 2.5 MILLIGRAM(S): 2.5 TABLET ORAL at 05:53

## 2023-03-08 RX ADMIN — Medication 5 MILLIGRAM(S): at 12:19

## 2023-03-08 RX ADMIN — PANTOPRAZOLE SODIUM 40 MILLIGRAM(S): 20 TABLET, DELAYED RELEASE ORAL at 05:54

## 2023-03-08 NOTE — PROGRESS NOTE ADULT - ASSESSMENT
89 year old, Sudanese speaking female, from home, ambulates w/ walker at baseline, w/ PMH of Atrial Flutter (on Apixaban), Epilepsy, Anxiety, and Urinary incontinence.  Presented w/ family due to a fall.  Admitted for Ambulatory Dysfunction    CT Head showed no intracranial hemorrhage and CT cervical spine did not show any fracture. EKG showed Atrial Flutter, similar to the previous one in Jan 2023.      Right hip ecchymosis-Pending xray of hip/pelvis.    PT recommended EDUARDA.   Planning to DC to [facility].  Awaiting Auth.      89 year old, New Zealander speaking female, from home, ambulates w/ walker at baseline, w/ PMH of Atrial Flutter (on Apixaban), Epilepsy, Anxiety, and Urinary incontinence.  Presented w/ family due to a fall.  Admitted for Ambulatory Dysfunction    CT Head showed no intracranial hemorrhage and CT cervical spine did not show any fracture. EKG showed Atrial Flutter, similar to the previous one in Jan 2023.      Right hip ecchymosis-Pending xray of hip/pelvis.    PT recommended EDUARDA.   Planning to DC to in-network facility.   Awaiting facility acceptance.  Pt will need Auth.

## 2023-03-08 NOTE — PROGRESS NOTE ADULT - ASSESSMENT
89 year old, South African speaking female, from home, ambulates w/ walker at baseline, w/ PMH of Atrial Flutter (on Apixaban), Epilepsy, Anxiety, and Urinary incontinence.  Presented w/ family due to a fall.  Admitted for Ambulatory Dysfunction    CT Head showed no intracranial hemorrhage and CT cervical spine did not show any fracture. EKG showed Atrial Flutter, similar to the previous one in Jan 2023.      Right hip ecchymosis-Pending xray of hip/pelvis.    PT recommended EDUARDA.   Planning to DC to in-network facility.   Awaiting facility acceptance.  Pt will need Auth.

## 2023-03-08 NOTE — PROGRESS NOTE ADULT - SUBJECTIVE AND OBJECTIVE BOX
Patient is a 89y old  Female who presents with a chief complaint of Multiple falls (07 Mar 2023 13:15)      INTERVAL HPI/OVERNIGHT EVENTS: Pt seen w/ staff translating Pashto.  Pt denies new concerns or complaints.      MEDICATIONS  (STANDING):  amLODIPine   Tablet 2.5 milliGRAM(s) Oral daily  apixaban 2.5 milliGRAM(s) Oral every 12 hours  lacosamide 100 milliGRAM(s) Oral two times a day  lidocaine   4% Patch 1 Patch Transdermal daily  metoprolol succinate ER 25 milliGRAM(s) Oral daily  oxybutynin XL 5 milliGRAM(s) Oral daily  pantoprazole    Tablet 40 milliGRAM(s) Oral before breakfast  polyethylene glycol 3350 17 Gram(s) Oral daily  senna 2 Tablet(s) Oral at bedtime  sertraline 100 milliGRAM(s) Oral daily    MEDICATIONS  (PRN):  acetaminophen     Tablet .. 650 milliGRAM(s) Oral every 6 hours PRN Temp greater or equal to 38C (100.4F), Mild Pain (1 - 3)  aluminum hydroxide/magnesium hydroxide/simethicone Suspension 30 milliLiter(s) Oral every 6 hours PRN Dyspepsia  melatonin 3 milliGRAM(s) Oral at bedtime PRN Sleep      __________________________________________________  REVIEW OF SYSTEMS:    CONSTITUTIONAL: No fever  EYES: No acute visual disturbances  NECK: No pain or stiffness  RESPIRATORY: No cough; No shortness of breath  CARDIOVASCULAR: No chest pain, no palpitations  GASTROINTESTINAL: No pain. No nausea or vomiting.  No diarrhea   NEUROLOGICAL: No headache or numbness, no tremors  MUSCULOSKELETAL: (+) Chronic left shoulder arthritis relieved by pain mgmt   GENITOURINARY: No dysuria, no frequency, no hesitancy  PSYCHIATRY: No depression , no anxiety  ALL OTHER  ROS negative      Vital Signs Last 24 Hrs  T(C): 36.6 (08 Mar 2023 14:14), Max: 36.7 (07 Mar 2023 21:00)  T(F): 97.9 (08 Mar 2023 14:14), Max: 98.1 (07 Mar 2023 21:00)  HR: 86 (08 Mar 2023 14:14) (76 - 100)  BP: 107/77 (08 Mar 2023 14:14) (107/77 - 122/74)  RR: 18 (08 Mar 2023 14:14) (17 - 18)  SpO2: 97% (08 Mar 2023 14:14) (96% - 98%)    Parameters below as of 08 Mar 2023 14:14  Patient On (Oxygen Delivery Method): room air        ________________________________________________  PHYSICAL EXAM:  GENERAL: NAD  HEENT: Normocephalic;  conjunctivae and sclerae clear; moist mucous membranes   NECK : Supple  CHEST/LUNG: dec breath sounds at bases  HEART: S1 S2  regular; no murmurs, gallops or rubs  ABDOMEN: Soft, Nontender, Nondistended; Bowel sounds present x 4 quad  EXTREMITIES: No cyanosis; no edema; no calf tenderness  SKIN: Warm and dry; (+) Right hip ecchymosis.  (+) B/l c/w PVD.  (+) B/l Ant. shin eschars.    NERVOUS SYSTEM:  Awake and alert;    _________________________________________________  LABS:                        13.6   6.52  )-----------( 204      ( 07 Mar 2023 05:56 )             43.0     03-07    137  |  103  |  19<H>  ----------------------------<  98  4.6   |  27  |  0.81    Ca    9.4      07 Mar 2023 05:56          CAPILLARY BLOOD GLUCOSE            RADIOLOGY & ADDITIONAL TESTS:    Imaging Personally Reviewed:  YES    Consultant(s) Notes Reviewed:   YES    Care Discussed with Consultants :     Plan of care was discussed with patient and /or primary care giver; all questions and concerns were addressed and care was aligned with patient's wishes.

## 2023-03-08 NOTE — PROGRESS NOTE ADULT - REASON FOR ADMISSION
Multiple falls

## 2023-03-08 NOTE — PROGRESS NOTE ADULT - SUBJECTIVE AND OBJECTIVE BOX
NP Note discussed with  primary attending    Patient is a 89y old  Female who presents with a chief complaint of Multiple falls (07 Mar 2023 13:15)      INTERVAL HPI/OVERNIGHT EVENTS: Pt seen w/ staff translating Belarusian.  Pt denies new concerns or complaints.      MEDICATIONS  (STANDING):  amLODIPine   Tablet 2.5 milliGRAM(s) Oral daily  apixaban 2.5 milliGRAM(s) Oral every 12 hours  lacosamide 100 milliGRAM(s) Oral two times a day  lidocaine   4% Patch 1 Patch Transdermal daily  metoprolol succinate ER 25 milliGRAM(s) Oral daily  oxybutynin XL 5 milliGRAM(s) Oral daily  pantoprazole    Tablet 40 milliGRAM(s) Oral before breakfast  polyethylene glycol 3350 17 Gram(s) Oral daily  senna 2 Tablet(s) Oral at bedtime  sertraline 100 milliGRAM(s) Oral daily    MEDICATIONS  (PRN):  acetaminophen     Tablet .. 650 milliGRAM(s) Oral every 6 hours PRN Temp greater or equal to 38C (100.4F), Mild Pain (1 - 3)  aluminum hydroxide/magnesium hydroxide/simethicone Suspension 30 milliLiter(s) Oral every 6 hours PRN Dyspepsia  melatonin 3 milliGRAM(s) Oral at bedtime PRN Sleep      __________________________________________________  REVIEW OF SYSTEMS:    CONSTITUTIONAL: No fever  EYES: No acute visual disturbances  NECK: No pain or stiffness  RESPIRATORY: No cough; No shortness of breath  CARDIOVASCULAR: No chest pain, no palpitations  GASTROINTESTINAL: No pain. No nausea or vomiting.  No diarrhea   NEUROLOGICAL: No headache or numbness, no tremors  MUSCULOSKELETAL: (+) Chronic left shoulder arthritis relieved by pain mgmt   GENITOURINARY: No dysuria, no frequency, no hesitancy  PSYCHIATRY: No depression , no anxiety  ALL OTHER  ROS negative      Vital Signs Last 24 Hrs  T(C): 36.6 (08 Mar 2023 14:14), Max: 36.7 (07 Mar 2023 21:00)  T(F): 97.9 (08 Mar 2023 14:14), Max: 98.1 (07 Mar 2023 21:00)  HR: 86 (08 Mar 2023 14:14) (76 - 100)  BP: 107/77 (08 Mar 2023 14:14) (107/77 - 122/74)  RR: 18 (08 Mar 2023 14:14) (17 - 18)  SpO2: 97% (08 Mar 2023 14:14) (96% - 98%)    Parameters below as of 08 Mar 2023 14:14  Patient On (Oxygen Delivery Method): room air        ________________________________________________  PHYSICAL EXAM:  GENERAL: NAD  HEENT: Normocephalic;  conjunctivae and sclerae clear; moist mucous membranes   NECK : Supple  CHEST/LUNG: Clear to auscultation bilaterally with good air entry   HEART: S1 S2  regular; no murmurs, gallops or rubs  ABDOMEN: Soft, Nontender, Nondistended; Bowel sounds present x 4 quad  EXTREMITIES: No cyanosis; no edema; no calf tenderness  SKIN: Warm and dry; (+) Right hip ecchymosis.  (+) B/l c/w PVD.  (+) B/l Ant. shin eschars.    NERVOUS SYSTEM:  Awake and alert; Oriented to place & person, not time; no new deficits  _________________________________________________  LABS:                        13.6   6.52  )-----------( 204      ( 07 Mar 2023 05:56 )             43.0     03-07    137  |  103  |  19<H>  ----------------------------<  98  4.6   |  27  |  0.81    Ca    9.4      07 Mar 2023 05:56          CAPILLARY BLOOD GLUCOSE            RADIOLOGY & ADDITIONAL TESTS:    Imaging Personally Reviewed:  YES    Consultant(s) Notes Reviewed:   YES    Care Discussed with Consultants :     Plan of care was discussed with patient and /or primary care giver; all questions and concerns were addressed and care was aligned with patient's wishes.

## 2023-03-08 NOTE — PROGRESS NOTE ADULT - PROBLEM SELECTOR PLAN 1
- P/w Fall d/t unsteady gait fall and right hip ecchymosis  - S/p CT Head & CT Cervical Spine negative    - PT recommended EDUARDA  - Maintain fall precautions  - 3/8 Hip/Pelvic xray performed, pending official read-f/u results

## 2023-03-09 VITALS
HEART RATE: 85 BPM | DIASTOLIC BLOOD PRESSURE: 93 MMHG | SYSTOLIC BLOOD PRESSURE: 123 MMHG | OXYGEN SATURATION: 96 % | TEMPERATURE: 98 F | RESPIRATION RATE: 18 BRPM

## 2023-03-09 PROCEDURE — 36415 COLL VENOUS BLD VENIPUNCTURE: CPT

## 2023-03-09 PROCEDURE — 97116 GAIT TRAINING THERAPY: CPT

## 2023-03-09 PROCEDURE — 90471 IMMUNIZATION ADMIN: CPT

## 2023-03-09 PROCEDURE — 71045 X-RAY EXAM CHEST 1 VIEW: CPT

## 2023-03-09 PROCEDURE — 82550 ASSAY OF CK (CPK): CPT

## 2023-03-09 PROCEDURE — 83735 ASSAY OF MAGNESIUM: CPT

## 2023-03-09 PROCEDURE — 84443 ASSAY THYROID STIM HORMONE: CPT

## 2023-03-09 PROCEDURE — 99285 EMERGENCY DEPT VISIT HI MDM: CPT

## 2023-03-09 PROCEDURE — 85610 PROTHROMBIN TIME: CPT

## 2023-03-09 PROCEDURE — 83036 HEMOGLOBIN GLYCOSYLATED A1C: CPT

## 2023-03-09 PROCEDURE — 70450 CT HEAD/BRAIN W/O DYE: CPT | Mod: MA

## 2023-03-09 PROCEDURE — 87635 SARS-COV-2 COVID-19 AMP PRB: CPT

## 2023-03-09 PROCEDURE — 85730 THROMBOPLASTIN TIME PARTIAL: CPT

## 2023-03-09 PROCEDURE — 80048 BASIC METABOLIC PNL TOTAL CA: CPT

## 2023-03-09 PROCEDURE — 72125 CT NECK SPINE W/O DYE: CPT | Mod: MA

## 2023-03-09 PROCEDURE — 90715 TDAP VACCINE 7 YRS/> IM: CPT

## 2023-03-09 PROCEDURE — 80053 COMPREHEN METABOLIC PANEL: CPT

## 2023-03-09 PROCEDURE — 73521 X-RAY EXAM HIPS BI 2 VIEWS: CPT

## 2023-03-09 PROCEDURE — 84100 ASSAY OF PHOSPHORUS: CPT

## 2023-03-09 PROCEDURE — 81001 URINALYSIS AUTO W/SCOPE: CPT

## 2023-03-09 PROCEDURE — 87637 SARSCOV2&INF A&B&RSV AMP PRB: CPT

## 2023-03-09 PROCEDURE — 97162 PT EVAL MOD COMPLEX 30 MIN: CPT

## 2023-03-09 PROCEDURE — 85025 COMPLETE CBC W/AUTO DIFF WBC: CPT

## 2023-03-09 PROCEDURE — 84484 ASSAY OF TROPONIN QUANT: CPT

## 2023-03-09 PROCEDURE — 93005 ELECTROCARDIOGRAM TRACING: CPT

## 2023-03-09 PROCEDURE — 87086 URINE CULTURE/COLONY COUNT: CPT

## 2023-03-09 PROCEDURE — 85027 COMPLETE CBC AUTOMATED: CPT

## 2023-03-09 RX ORDER — OXYBUTYNIN CHLORIDE 5 MG
1 TABLET ORAL
Qty: 0 | Refills: 0 | DISCHARGE
Start: 2023-03-09

## 2023-03-09 RX ORDER — PANTOPRAZOLE SODIUM 20 MG/1
1 TABLET, DELAYED RELEASE ORAL
Qty: 0 | Refills: 0 | DISCHARGE
Start: 2023-03-09

## 2023-03-09 RX ORDER — SENNA PLUS 8.6 MG/1
2 TABLET ORAL
Qty: 0 | Refills: 0 | DISCHARGE
Start: 2023-03-09

## 2023-03-09 RX ORDER — SERTRALINE 25 MG/1
1 TABLET, FILM COATED ORAL
Qty: 0 | Refills: 0 | DISCHARGE
Start: 2023-03-09

## 2023-03-09 RX ORDER — POLYETHYLENE GLYCOL 3350 17 G/17G
17 POWDER, FOR SOLUTION ORAL
Qty: 0 | Refills: 0 | DISCHARGE
Start: 2023-03-09

## 2023-03-09 RX ORDER — METOPROLOL TARTRATE 50 MG
1 TABLET ORAL
Qty: 0 | Refills: 0 | DISCHARGE

## 2023-03-09 RX ORDER — ESOMEPRAZOLE MAGNESIUM 40 MG/1
1 CAPSULE, DELAYED RELEASE ORAL
Qty: 0 | Refills: 0 | DISCHARGE

## 2023-03-09 RX ORDER — LIDOCAINE 4 G/100G
1 CREAM TOPICAL
Qty: 0 | Refills: 0 | DISCHARGE
Start: 2023-03-09

## 2023-03-09 RX ORDER — APIXABAN 2.5 MG/1
1 TABLET, FILM COATED ORAL
Qty: 0 | Refills: 0 | DISCHARGE
Start: 2023-03-09

## 2023-03-09 RX ORDER — LACOSAMIDE 50 MG/1
1 TABLET ORAL
Qty: 0 | Refills: 0 | DISCHARGE

## 2023-03-09 RX ORDER — SERTRALINE 25 MG/1
1 TABLET, FILM COATED ORAL
Qty: 0 | Refills: 0 | DISCHARGE

## 2023-03-09 RX ORDER — LACOSAMIDE 50 MG/1
1 TABLET ORAL
Qty: 0 | Refills: 0 | DISCHARGE
Start: 2023-03-09

## 2023-03-09 RX ORDER — LANOLIN ALCOHOL/MO/W.PET/CERES
1 CREAM (GRAM) TOPICAL
Qty: 0 | Refills: 0 | DISCHARGE
Start: 2023-03-09

## 2023-03-09 RX ORDER — METOPROLOL TARTRATE 50 MG
1 TABLET ORAL
Qty: 0 | Refills: 0 | DISCHARGE
Start: 2023-03-09

## 2023-03-09 RX ORDER — AMLODIPINE BESYLATE 2.5 MG/1
1 TABLET ORAL
Qty: 0 | Refills: 0 | DISCHARGE
Start: 2023-03-09

## 2023-03-09 RX ORDER — AMLODIPINE BESYLATE 2.5 MG/1
1 TABLET ORAL
Qty: 0 | Refills: 0 | DISCHARGE

## 2023-03-09 RX ORDER — ACETAMINOPHEN 500 MG
2 TABLET ORAL
Qty: 0 | Refills: 0 | DISCHARGE
Start: 2023-03-09

## 2023-03-09 RX ORDER — OXYBUTYNIN CHLORIDE 5 MG
1 TABLET ORAL
Qty: 0 | Refills: 0 | DISCHARGE

## 2023-03-09 RX ADMIN — LACOSAMIDE 100 MILLIGRAM(S): 50 TABLET ORAL at 05:00

## 2023-03-09 RX ADMIN — LIDOCAINE 1 PATCH: 4 CREAM TOPICAL at 00:35

## 2023-03-09 RX ADMIN — AMLODIPINE BESYLATE 2.5 MILLIGRAM(S): 2.5 TABLET ORAL at 05:00

## 2023-03-09 RX ADMIN — Medication 25 MILLIGRAM(S): at 05:00

## 2023-03-09 RX ADMIN — PANTOPRAZOLE SODIUM 40 MILLIGRAM(S): 20 TABLET, DELAYED RELEASE ORAL at 06:01

## 2023-03-09 RX ADMIN — APIXABAN 2.5 MILLIGRAM(S): 2.5 TABLET, FILM COATED ORAL at 05:00

## 2023-03-09 NOTE — DISCHARGE NOTE NURSING/CASE MANAGEMENT/SOCIAL WORK - NSDPFAC_GEN_ALL_CORE
[facility], [address], [place] [address], [phone]

## 2023-03-09 NOTE — DISCHARGE NOTE NURSING/CASE MANAGEMENT/SOCIAL WORK - NSDCPEFALRISK_GEN_ALL_CORE
For information on Fall & Injury Prevention, visit: [email or web address] OR  [email or web address] OR  https://www.cdc.gov/steadi/patient.html

## 2023-03-09 NOTE — DISCHARGE NOTE NURSING/CASE MANAGEMENT/SOCIAL WORK - PATIENT PORTAL LINK FT
You can access the FollowMyHealth Patient Portal offered by [facility] by registering at the following website: [email or web address]. By joining [employer]’s FollowMyHealth portal, you will also be able to view your health information using other applications (apps) compatible with our system.

## 2023-03-09 NOTE — CHART NOTE - NSCHARTNOTEFT_GEN_A_CORE
NP spoke w/ dtr-Waleska Peña and informed pt was being dc'd to [place].  Dtr verbalized understanding and in agreement.

## 2023-07-02 ENCOUNTER — EMERGENCY (EMERGENCY)
Facility: HOSPITAL | Age: 88
LOS: 1 days | Discharge: HOME | End: 2023-07-02
Attending: EMERGENCY MEDICINE
Payer: MEDICARE

## 2023-07-02 VITALS
SYSTOLIC BLOOD PRESSURE: 157 MMHG | HEIGHT: 60 IN | WEIGHT: 95.9 LBS | TEMPERATURE: 99 F | DIASTOLIC BLOOD PRESSURE: 90 MMHG | HEART RATE: 125 BPM | RESPIRATION RATE: 18 BRPM | OXYGEN SATURATION: 95 %

## 2023-07-02 VITALS
TEMPERATURE: 99 F | SYSTOLIC BLOOD PRESSURE: 127 MMHG | RESPIRATION RATE: 18 BRPM | HEART RATE: 88 BPM | DIASTOLIC BLOOD PRESSURE: 85 MMHG | OXYGEN SATURATION: 97 %

## 2023-07-02 PROBLEM — F41.9 ANXIETY DISORDER, UNSPECIFIED: Chronic | Status: ACTIVE | Noted: 2023-02-28

## 2023-07-02 PROBLEM — K21.9 GASTRO-ESOPHAGEAL REFLUX DISEASE WITHOUT ESOPHAGITIS: Chronic | Status: ACTIVE | Noted: 2023-02-28

## 2023-07-02 PROBLEM — I48.92 UNSPECIFIED ATRIAL FLUTTER: Chronic | Status: ACTIVE | Noted: 2023-02-28

## 2023-07-02 PROBLEM — R32 UNSPECIFIED URINARY INCONTINENCE: Chronic | Status: ACTIVE | Noted: 2023-02-28

## 2023-07-02 LAB
ALBUMIN SERPL ELPH-MCNC: 3.2 G/DL — LOW (ref 3.5–5)
ALP SERPL-CCNC: 116 U/L — SIGNIFICANT CHANGE UP (ref 40–120)
ALT FLD-CCNC: 16 U/L DA — SIGNIFICANT CHANGE UP (ref 10–60)
ANION GAP SERPL CALC-SCNC: 7 MMOL/L — SIGNIFICANT CHANGE UP (ref 5–17)
APTT BLD: 30.4 SEC — SIGNIFICANT CHANGE UP (ref 27.5–35.5)
AST SERPL-CCNC: 17 U/L — SIGNIFICANT CHANGE UP (ref 10–40)
BILIRUB SERPL-MCNC: 0.5 MG/DL — SIGNIFICANT CHANGE UP (ref 0.2–1.2)
BUN SERPL-MCNC: 28 MG/DL — HIGH (ref 7–18)
CALCIUM SERPL-MCNC: 9.5 MG/DL — SIGNIFICANT CHANGE UP (ref 8.4–10.5)
CHLORIDE SERPL-SCNC: 110 MMOL/L — HIGH (ref 96–108)
CO2 SERPL-SCNC: 25 MMOL/L — SIGNIFICANT CHANGE UP (ref 22–31)
CREAT SERPL-MCNC: 0.86 MG/DL — SIGNIFICANT CHANGE UP (ref 0.5–1.3)
EGFR: 63 ML/MIN/1.73M2 — SIGNIFICANT CHANGE UP
GLUCOSE SERPL-MCNC: 94 MG/DL — SIGNIFICANT CHANGE UP (ref 70–99)
HCT VFR BLD CALC: 40.3 % — SIGNIFICANT CHANGE UP (ref 34.5–45)
HGB BLD-MCNC: 12.5 G/DL — SIGNIFICANT CHANGE UP (ref 11.5–15.5)
INR BLD: 1.11 RATIO — SIGNIFICANT CHANGE UP (ref 0.88–1.16)
MCHC RBC-ENTMCNC: 26.9 PG — LOW (ref 27–34)
MCHC RBC-ENTMCNC: 31 GM/DL — LOW (ref 32–36)
MCV RBC AUTO: 86.9 FL — SIGNIFICANT CHANGE UP (ref 80–100)
NRBC # BLD: 0 /100 WBCS — SIGNIFICANT CHANGE UP (ref 0–0)
NT-PROBNP SERPL-SCNC: 1026 PG/ML — HIGH (ref 0–450)
PLATELET # BLD AUTO: 148 K/UL — LOW (ref 150–400)
POTASSIUM SERPL-MCNC: 4.5 MMOL/L — SIGNIFICANT CHANGE UP (ref 3.5–5.3)
POTASSIUM SERPL-SCNC: 4.5 MMOL/L — SIGNIFICANT CHANGE UP (ref 3.5–5.3)
PROT SERPL-MCNC: 6.4 G/DL — SIGNIFICANT CHANGE UP (ref 6–8.3)
PROTHROM AB SERPL-ACNC: 13.2 SEC — SIGNIFICANT CHANGE UP (ref 10.5–13.4)
RBC # BLD: 4.64 M/UL — SIGNIFICANT CHANGE UP (ref 3.8–5.2)
RBC # FLD: 14.8 % — HIGH (ref 10.3–14.5)
SODIUM SERPL-SCNC: 142 MMOL/L — SIGNIFICANT CHANGE UP (ref 135–145)
TROPONIN I, HIGH SENSITIVITY RESULT: 13.6 NG/L — SIGNIFICANT CHANGE UP
WBC # BLD: 6.8 K/UL — SIGNIFICANT CHANGE UP (ref 3.8–10.5)
WBC # FLD AUTO: 6.8 K/UL — SIGNIFICANT CHANGE UP (ref 3.8–10.5)

## 2023-07-02 PROCEDURE — 99285 EMERGENCY DEPT VISIT HI MDM: CPT

## 2023-07-02 PROCEDURE — 71045 X-RAY EXAM CHEST 1 VIEW: CPT | Mod: 26

## 2023-07-02 PROCEDURE — 71045 X-RAY EXAM CHEST 1 VIEW: CPT

## 2023-07-02 PROCEDURE — 99285 EMERGENCY DEPT VISIT HI MDM: CPT | Mod: 25

## 2023-07-02 PROCEDURE — 93005 ELECTROCARDIOGRAM TRACING: CPT

## 2023-07-02 PROCEDURE — 83880 ASSAY OF NATRIURETIC PEPTIDE: CPT

## 2023-07-02 PROCEDURE — 36415 COLL VENOUS BLD VENIPUNCTURE: CPT

## 2023-07-02 PROCEDURE — 84484 ASSAY OF TROPONIN QUANT: CPT

## 2023-07-02 PROCEDURE — 85610 PROTHROMBIN TIME: CPT

## 2023-07-02 PROCEDURE — 85730 THROMBOPLASTIN TIME PARTIAL: CPT

## 2023-07-02 PROCEDURE — 80053 COMPREHEN METABOLIC PANEL: CPT

## 2023-07-02 PROCEDURE — 85027 COMPLETE CBC AUTOMATED: CPT

## 2023-07-02 RX ORDER — METOPROLOL TARTRATE 50 MG
25 TABLET ORAL ONCE
Refills: 0 | Status: COMPLETED | OUTPATIENT
Start: 2023-07-02 | End: 2023-07-02

## 2023-07-02 RX ADMIN — Medication 25 MILLIGRAM(S): at 07:50

## 2023-07-02 NOTE — ED PROVIDER NOTE - PROGRESS NOTE DETAILS
EKG - aflutter, rate 116, no ectopy labs - pre renal, bnp 1000  CXR - lungs clear  HR improved  Asymptomatic  Dc

## 2023-07-02 NOTE — ED PROVIDER NOTE - OBJECTIVE STATEMENT
89 yo F pmh of afib (on Eliquis, no BB/CCB on med list)  Presents from home s/p episode of SOB. Per daughter, pt went to bathroom, drank water then laid down in bed and c/o SOB. Feels well and asymptomatic on arrival to ED. Daughters thinks pt choked b/c she has mild chronic dysphagia.   Denies other acute complaints  Malian translation via daughter at bedside

## 2023-07-02 NOTE — ED PROVIDER NOTE - PATIENT PORTAL LINK FT
You can access the FollowMyHealth Patient Portal offered by [facility] by registering at the following website: [email or web address]. By joining [employer]’s FollowMyHealth portal, you will also be able to view your health information using other applications (apps) compatible with our system.

## 2023-07-02 NOTE — ED PROVIDER NOTE - PHYSICAL EXAMINATION
GENERAL: well appearing, no acute distress   HEAD: atraumatic   EYES: EOMI   ENT: moist oral mucosa   CARDIAC: irregularly irregular, rate 115 bpm   RESPIRATORY: no increased work of breathing, lungs clear  ABDO: soft NTND  MUSCULOSKELETAL: no deformity   NEUROLOGICAL: alert, spontaneous movement of extremities   SKIN: no visible rash  PSYCHIATRIC: cooperative

## 2023-07-02 NOTE — ED PROVIDER NOTE - IV ALTEPLASE ADMIN OUTSIDE HIDDEN
PROGRESS NOTE:   Authored by: Bj Park M.D.   Internal Medicine PGY-1  Please Contact Via Teams    Patient is a 71y old  Female who presents with a chief complaint of FTT (18 Jan 2023 19:15)      SUBJECTIVE / OVERNIGHT EVENTS:  No acute events overnight.     Brief Daily Plan:    ADDITIONAL REVIEW OF SYSTEMS:  Patient denies fevers, chills, chest pain, shortness of breath, nausea, abdominal pain, diarrhea, constipation, dysuria, leg swelling, headache, light headedness.    MEDICATIONS  (STANDING):  acetaminophen   IVPB .. 1000 milliGRAM(s) IV Intermittent once  amoxicillin  875 milliGRAM(s)/clavulanate 1 Tablet(s) Oral two times a day  enoxaparin Injectable 120 milliGRAM(s) SubCutaneous every 12 hours  lidocaine   4% Patch 1 Patch Transdermal every 24 hours  lidocaine   4% Patch 1 Patch Transdermal daily  lidocaine 2% Gel 1 Application(s) Topical two times a day  pantoprazole    Tablet 40 milliGRAM(s) Oral two times a day  polyethylene glycol 3350 17 Gram(s) Oral daily  senna 2 Tablet(s) Oral at bedtime    MEDICATIONS  (PRN):  acetaminophen     Tablet .. 650 milliGRAM(s) Oral every 6 hours PRN Moderate Pain (4 - 6)  melatonin 3 milliGRAM(s) Oral at bedtime PRN Sleep      CAPILLARY BLOOD GLUCOSE        I&O's Summary      PHYSICAL EXAM:  Vital Signs Last 24 Hrs  T(C): 36.4 (19 Jan 2023 05:31), Max: 36.8 (19 Jan 2023 02:06)  T(F): 97.5 (19 Jan 2023 05:31), Max: 98.2 (19 Jan 2023 02:06)  HR: 83 (19 Jan 2023 05:31) (78 - 86)  BP: 118/69 (19 Jan 2023 05:31) (118/69 - 133/73)  BP(mean): --  RR: 17 (19 Jan 2023 05:31) (17 - 18)  SpO2: 95% (19 Jan 2023 05:31) (95% - 98%)    Parameters below as of 19 Jan 2023 05:31  Patient On (Oxygen Delivery Method): room air        CONSTITUTIONAL: NAD, well-developed  RESPIRATORY: Normal respiratory effort; lungs are clear to auscultation bilaterally  CARDIOVASCULAR: Regular rate and rhythm, normal S1 and S2, no murmur/rub/gallop; No lower extremity edema; Peripheral pulses are 2+ bilaterally  ABDOMEN: Nontender to palpation, normoactive bowel sounds, no rebound/guarding; No hepatosplenomegaly  MUSCLOSKELETAL: no clubbing or cyanosis of digits; no joint swelling or tenderness to palpation  PSYCH: A+O to person, place, and time; affect appropriate    LABS:                        11.0   12.36 )-----------( 214      ( 18 Jan 2023 06:25 )             33.9     01-18    135  |  99  |  33<H>  ----------------------------<  94  3.7   |  25  |  1.06    Ca    9.7      18 Jan 2023 06:25  Phos  2.9     01-18  Mg     1.80     01-18    TPro  7.0  /  Alb  2.2<L>  /  TBili  1.6<H>  /  DBili  x   /  AST  35<H>  /  ALT  23  /  AlkPhos  487<H>  01-18              Culture - Blood (collected 17 Jan 2023 06:44)  Source: .Blood Blood  Preliminary Report (18 Jan 2023 12:02):    No growth to date.        Tele Reviewed:    RADIOLOGY & ADDITIONAL TESTS:  Results Reviewed:   Imaging Personally Reviewed:  Electrocardiogram Personally Reviewed:
full weight-bearing
show
PROGRESS NOTE:   Authored by: Bj Park M.D.   Internal Medicine PGY-1  Please Contact Via Teams    Patient is a 71y old  Female who presents with a chief complaint of FTT (18 Jan 2023 19:15)      SUBJECTIVE / OVERNIGHT EVENTS:  No acute events overnight. Patient feeling well this morning with no complaints. Again, she would like to go home instead of rehab. She wants to sort out her leg pain first. Her pain is chronic osteoarthritis according to her.    Brief Daily Plan:  - TVUS    MEDICATIONS  (STANDING):  acetaminophen   IVPB .. 1000 milliGRAM(s) IV Intermittent once  amoxicillin  875 milliGRAM(s)/clavulanate 1 Tablet(s) Oral two times a day  enoxaparin Injectable 120 milliGRAM(s) SubCutaneous every 12 hours  lidocaine   4% Patch 1 Patch Transdermal every 24 hours  lidocaine   4% Patch 1 Patch Transdermal daily  lidocaine 2% Gel 1 Application(s) Topical two times a day  pantoprazole    Tablet 40 milliGRAM(s) Oral two times a day  polyethylene glycol 3350 17 Gram(s) Oral daily  senna 2 Tablet(s) Oral at bedtime    MEDICATIONS  (PRN):  acetaminophen     Tablet .. 650 milliGRAM(s) Oral every 6 hours PRN Moderate Pain (4 - 6)  melatonin 3 milliGRAM(s) Oral at bedtime PRN Sleep      CAPILLARY BLOOD GLUCOSE        I&O's Summary      PHYSICAL EXAM:  Vital Signs Last 24 Hrs  T(C): 36.4 (19 Jan 2023 05:31), Max: 36.8 (19 Jan 2023 02:06)  T(F): 97.5 (19 Jan 2023 05:31), Max: 98.2 (19 Jan 2023 02:06)  HR: 83 (19 Jan 2023 05:31) (78 - 86)  BP: 118/69 (19 Jan 2023 05:31) (118/69 - 133/73)  BP(mean): --  RR: 17 (19 Jan 2023 05:31) (17 - 18)  SpO2: 95% (19 Jan 2023 05:31) (95% - 98%)    Parameters below as of 19 Jan 2023 05:31  Patient On (Oxygen Delivery Method): room air      GENERAL: Obese, comfortable  HEAD:  Atraumatic, Normocephalic  EYES: EOMI, PERRLA, conjunctiva and sclera clear  ENT: Moist mucous membranes  NECK: Supple, No JVD  CHEST/LUNG: Clear to auscultation bilaterally; No wheezing or rhonchi  HEART: Tachycardic; No murmurs.  Peripheral edema: None. Legs large  ABDOMEN: BSx4; Soft, nontender, large  EXTREMITIES:  2+ radial pulses. Calfs nontender non erythematous.  NERVOUS SYSTEM:  A&Ox3, no gross lateralizing deficits   SKIN: No rashes    LABS:                        11.0   12.36 )-----------( 214      ( 18 Jan 2023 06:25 )             33.9     01-18    135  |  99  |  33<H>  ----------------------------<  94  3.7   |  25  |  1.06    Ca    9.7      18 Jan 2023 06:25  Phos  2.9     01-18  Mg     1.80     01-18    TPro  7.0  /  Alb  2.2<L>  /  TBili  1.6<H>  /  DBili  x   /  AST  35<H>  /  ALT  23  /  AlkPhos  487<H>  01-18              Culture - Blood (collected 17 Jan 2023 06:44)  Source: .Blood Blood  Preliminary Report (18 Jan 2023 12:02):    No growth to date.            < from: US Transvaginal (01.19.23 @ 12:17) >  IMPRESSION:  Thickening of endometrial cavity with cystic change in this   postmenopausal patient. Neoplasm is possible. Histologic sampling is   advised.    < end of copied text >

## 2023-07-02 NOTE — ED ADULT TRIAGE NOTE - CHIEF COMPLAINT QUOTE
enmanuel from home with c/o got choke while drinking water, patient said  she feels better, h/o a- fib on eliquis as per ems

## 2023-07-02 NOTE — ED PROVIDER NOTE - CLINICAL SUMMARY MEDICAL DECISION MAKING FREE TEXT BOX
Elderly F w/ transiet episode of SOB after ?choking w/ aspiration vs afib w/ RVR vs other  Plan - EKG, CXR, labs, rate control, reassess Elderly F w/ transient episode of SOB after ?choking w/ aspiration vs afib w/ RVR vs other  Plan - EKG, CXR, labs, rate control, reassess

## 2023-07-02 NOTE — ED ADULT NURSE NOTE - NSFALLHARMRISKINTERV_ED_ALL_ED
Assistance OOB with selected safe patient handling equipment if applicable/Communicate risk of Fall with Harm to all staff, patient, and family/Monitor gait and stability/Provide patient with walking aids/Provide visual cue: red socks, yellow wristband, yellow gown, etc/Reinforce activity limits and safety measures with patient and family/Bed in lowest position, wheels locked, appropriate side rails in place/Call bell, personal items and telephone in reach/Instruct patient to call for assistance before getting out of bed/chair/stretcher/Non-slip footwear applied when patient is off stretcher/[place] to call system/Physically safe environment - no spills, clutter or unnecessary equipment/Purposeful Proactive Rounding/Room/bathroom lighting operational, light cord in reach

## 2023-07-31 NOTE — ED PROVIDER NOTE - CPE EDP MUSC NORM
Per MD, Pt is new vent. Due to [facility] being unable to take new ventilator patients, Pt will need to be placed at another facility. SW following.       07/31/23 0929   Discharge Reassessment   Assessment Type Discharge Planning Reassessment   Did the patient's condition or plan change since previous assessment? No   Discharge Plan A New Nursing Home placement - assisted care facility   Discharge Plan B New Nursing Home placement - assisted care facility   Transition of Care Barriers None   Why the patient remains in the hospital Requires continued medical care        normal...

## 2023-11-21 NOTE — PATIENT PROFILE ADULT - FALL HARM RISK - FACTORS
Patient last office evaluation October 30 showed patient completed home PT and met goals, she is not at home bound and does not meet criteria for home health services. Impaired gait

## 2024-04-26 NOTE — PATIENT PROFILE ADULT - FUNCTIONAL ASSESSMENT - DAILY ACTIVITY SCORE.
Detail Level: Zone
Render In Strict Bullet Format?: No
Initiate Treatment: .\\n\\n-tretinoin 0.025 % topical cream QPM\\n\\n-Winlevi 1 % topical cream BID
18

## 2024-09-12 NOTE — H&P ADULT - REASON FOR ADMISSION
Per check out note September 10, 2024:  Virtual visit 2 months.   Please put letter at  for pt.    Sent mychart with scheduling ticket. Placed letter at [facility], let patient know.   
Spoke with patient and gave information below. Patient stated understanding and will be getting letter this week or next. She has not yet started that medication that she would be following up on so will wait to schedule virtual until she has been on that medication for about 2 months and schedule once she starts it.     Patricia Duran CMA ([facility])    
Multiple falls

## 2024-11-01 NOTE — ED PROVIDER NOTE - NEUROLOGICAL, MLM
Initiate Treatment: VI peel once a month for 3 months Continue Regimen: Clarx lightening cream once daily Detail Level: Zone Alert and oriented, no focal deficits, no motor or sensory deficits.

## 2025-01-22 ENCOUNTER — INPATIENT (INPATIENT)
Facility: HOSPITAL | Age: 89
LOS: 8 days | Discharge: HOME | DRG: 195 | End: 2025-01-31
Attending: INTERNAL MEDICINE | Admitting: INTERNAL MEDICINE
Payer: COMMERCIAL

## 2025-01-22 VITALS
HEIGHT: 59 IN | WEIGHT: 106.92 LBS | SYSTOLIC BLOOD PRESSURE: 162 MMHG | DIASTOLIC BLOOD PRESSURE: 78 MMHG | TEMPERATURE: 99 F | OXYGEN SATURATION: 98 % | HEART RATE: 85 BPM | RESPIRATION RATE: 17 BRPM

## 2025-01-22 DIAGNOSIS — J18.9 PNEUMONIA, UNSPECIFIED ORGANISM: ICD-10-CM

## 2025-01-22 LAB
ALBUMIN SERPL ELPH-MCNC: 3.6 G/DL — SIGNIFICANT CHANGE UP (ref 3.5–5)
ALP SERPL-CCNC: 130 U/L — HIGH (ref 40–120)
ALT FLD-CCNC: 19 U/L DA — SIGNIFICANT CHANGE UP (ref 10–60)
ANION GAP SERPL CALC-SCNC: 8 MMOL/L — SIGNIFICANT CHANGE UP (ref 5–17)
AST SERPL-CCNC: 27 U/L — SIGNIFICANT CHANGE UP (ref 10–40)
BASOPHILS # BLD AUTO: 0.02 K/UL — SIGNIFICANT CHANGE UP (ref 0–0.2)
BASOPHILS NFR BLD AUTO: 0.4 % — SIGNIFICANT CHANGE UP (ref 0–2)
BILIRUB SERPL-MCNC: 0.8 MG/DL — SIGNIFICANT CHANGE UP (ref 0.2–1.2)
BUN SERPL-MCNC: 14 MG/DL — SIGNIFICANT CHANGE UP (ref 7–18)
CALCIUM SERPL-MCNC: 9.2 MG/DL — SIGNIFICANT CHANGE UP (ref 8.4–10.5)
CHLORIDE SERPL-SCNC: 104 MMOL/L — SIGNIFICANT CHANGE UP (ref 96–108)
CO2 SERPL-SCNC: 27 MMOL/L — SIGNIFICANT CHANGE UP (ref 22–31)
CREAT SERPL-MCNC: 0.61 MG/DL — SIGNIFICANT CHANGE UP (ref 0.5–1.3)
EGFR: 82 ML/MIN/1.73M2 — SIGNIFICANT CHANGE UP
EOSINOPHIL # BLD AUTO: 0.1 K/UL — SIGNIFICANT CHANGE UP (ref 0–0.5)
EOSINOPHIL NFR BLD AUTO: 2.2 % — SIGNIFICANT CHANGE UP (ref 0–6)
FLUAV AG NPH QL: DETECTED
FLUBV AG NPH QL: SIGNIFICANT CHANGE UP
GLUCOSE SERPL-MCNC: 93 MG/DL — SIGNIFICANT CHANGE UP (ref 70–99)
HCT VFR BLD CALC: 40.4 % — SIGNIFICANT CHANGE UP (ref 34.5–45)
HGB BLD-MCNC: 12.9 G/DL — SIGNIFICANT CHANGE UP (ref 11.5–15.5)
IMM GRANULOCYTES NFR BLD AUTO: 0.2 % — SIGNIFICANT CHANGE UP (ref 0–0.9)
LACTATE SERPL-SCNC: 1.3 MMOL/L — SIGNIFICANT CHANGE UP (ref 0.7–2)
LYMPHOCYTES # BLD AUTO: 0.7 K/UL — LOW (ref 1–3.3)
LYMPHOCYTES # BLD AUTO: 15.7 % — SIGNIFICANT CHANGE UP (ref 13–44)
MCHC RBC-ENTMCNC: 27.8 PG — SIGNIFICANT CHANGE UP (ref 27–34)
MCHC RBC-ENTMCNC: 31.9 G/DL — LOW (ref 32–36)
MCV RBC AUTO: 87.1 FL — SIGNIFICANT CHANGE UP (ref 80–100)
MONOCYTES # BLD AUTO: 0.36 K/UL — SIGNIFICANT CHANGE UP (ref 0–0.9)
MONOCYTES NFR BLD AUTO: 8.1 % — SIGNIFICANT CHANGE UP (ref 2–14)
NEUTROPHILS # BLD AUTO: 3.26 K/UL — SIGNIFICANT CHANGE UP (ref 1.8–7.4)
NEUTROPHILS NFR BLD AUTO: 73.4 % — SIGNIFICANT CHANGE UP (ref 43–77)
NRBC # BLD: 0 /100 WBCS — SIGNIFICANT CHANGE UP (ref 0–0)
NRBC BLD-RTO: 0 /100 WBCS — SIGNIFICANT CHANGE UP (ref 0–0)
NT-PROBNP SERPL-SCNC: 4337 PG/ML — HIGH (ref 0–450)
PLATELET # BLD AUTO: 89 K/UL — LOW (ref 150–400)
POTASSIUM SERPL-MCNC: 3.3 MMOL/L — LOW (ref 3.5–5.3)
POTASSIUM SERPL-SCNC: 3.3 MMOL/L — LOW (ref 3.5–5.3)
PROT SERPL-MCNC: 7.1 G/DL — SIGNIFICANT CHANGE UP (ref 6–8.3)
RBC # BLD: 4.64 M/UL — SIGNIFICANT CHANGE UP (ref 3.8–5.2)
RBC # FLD: 14.6 % — HIGH (ref 10.3–14.5)
RSV RNA NPH QL NAA+NON-PROBE: SIGNIFICANT CHANGE UP
SARS-COV-2 RNA SPEC QL NAA+PROBE: SIGNIFICANT CHANGE UP
SODIUM SERPL-SCNC: 139 MMOL/L — SIGNIFICANT CHANGE UP (ref 135–145)
WBC # BLD: 4.45 K/UL — SIGNIFICANT CHANGE UP (ref 3.8–10.5)
WBC # FLD AUTO: 4.45 K/UL — SIGNIFICANT CHANGE UP (ref 3.8–10.5)

## 2025-01-22 PROCEDURE — 71250 CT THORAX DX C-: CPT | Mod: 26

## 2025-01-22 PROCEDURE — 99285 EMERGENCY DEPT VISIT HI MDM: CPT

## 2025-01-22 RX ORDER — POTASSIUM CHLORIDE 750 MG/1
40 TABLET, EXTENDED RELEASE ORAL ONCE
Refills: 0 | Status: COMPLETED | OUTPATIENT
Start: 2025-01-22 | End: 2025-01-22

## 2025-01-22 RX ORDER — CEFTRIAXONE 250 MG/1
1000 INJECTION, POWDER, FOR SOLUTION INTRAMUSCULAR; INTRAVENOUS ONCE
Refills: 0 | Status: COMPLETED | OUTPATIENT
Start: 2025-01-22 | End: 2025-01-22

## 2025-01-22 RX ORDER — ACETAMINOPHEN 160 MG/5ML
650 SUSPENSION ORAL ONCE
Refills: 0 | Status: COMPLETED | OUTPATIENT
Start: 2025-01-22 | End: 2025-01-22

## 2025-01-22 RX ORDER — AZITHROMYCIN DIHYDRATE 500 MG/1
500 TABLET, FILM COATED ORAL ONCE
Refills: 0 | Status: COMPLETED | OUTPATIENT
Start: 2025-01-22 | End: 2025-01-22

## 2025-01-22 RX ADMIN — ACETAMINOPHEN 650 MILLIGRAM(S): 160 SUSPENSION ORAL at 23:30

## 2025-01-22 RX ADMIN — CEFTRIAXONE 100 MILLIGRAM(S): 250 INJECTION, POWDER, FOR SOLUTION INTRAMUSCULAR; INTRAVENOUS at 20:53

## 2025-01-22 RX ADMIN — ACETAMINOPHEN 650 MILLIGRAM(S): 160 SUSPENSION ORAL at 22:22

## 2025-01-22 RX ADMIN — AZITHROMYCIN DIHYDRATE 255 MILLIGRAM(S): 500 TABLET, FILM COATED ORAL at 21:30

## 2025-01-22 RX ADMIN — CEFTRIAXONE 1000 MILLIGRAM(S): 250 INJECTION, POWDER, FOR SOLUTION INTRAMUSCULAR; INTRAVENOUS at 21:27

## 2025-01-22 RX ADMIN — AZITHROMYCIN DIHYDRATE 500 MILLIGRAM(S): 500 TABLET, FILM COATED ORAL at 23:30

## 2025-01-22 RX ADMIN — POTASSIUM CHLORIDE 40 MILLIEQUIVALENT(S): 750 TABLET, EXTENDED RELEASE ORAL at 22:22

## 2025-01-22 NOTE — ED PROVIDER NOTE - CLINICAL SUMMARY MEDICAL DECISION MAKING FREE TEXT BOX
89-year-old female with complaining of coughing, SOB, weakness 89-year-old female with complaining of coughing, SOB, weakness.  Patient was diagnosed with influenza A at urgent care.  Family noted patient with respiratory distress and low O2 sat, concern for CHF, pneumonia superimposed on viral infection.  Will get labs, culture, CT chest, give antibiotics and reassess

## 2025-01-22 NOTE — ED ADULT NURSE NOTE - OBJECTIVE STATEMENT
Patient present to ED BIB family with worsen production coug and Labor breather brought from Urgent Care tested positive for flu from UC. As per family patient patient has been diagnosed with fluids in lung was on lasix

## 2025-01-22 NOTE — ED PROVIDER NOTE - MUSCULOSKELETAL, MLM
Spine appears kyphotic, range of motion is not limited, no muscle or joint tenderness, donna lower ext-venous stasis

## 2025-01-22 NOTE — ED ADULT NURSE NOTE - NSFALLHARMRISKINTERV_ED_ALL_ED

## 2025-01-22 NOTE — ED PROVIDER NOTE - OBJECTIVE STATEMENT
89-year-old female with history of A-fib on Eliquis, HTN, seizures, GERD, anxiety depression.  As per granddaughter, patient with leg edema/SOB 2 months ago.  Chest x-ray showed pleural effusion.  She was evaluated by cardiologist 3 weeks ago, given Lasix 20 mg for total 6 days with improvement.  Patient now presents to ED with sick contact at home, almost everybody had the flu. patient with coughing, chest congestion, coughing up clear sputum, runny nose for past 4 days, worse past 2 days with SOB, weakness.  She denies chest pain, vomiting, appetite fair.  Patient went to urgent care and was diagnosed with flu A.  Granddaughter noticed patient's pulse ox dropped to upper 80s

## 2025-01-22 NOTE — ED PROVIDER NOTE - PROGRESS NOTE DETAILS
lab/CT result explained to patient and her family   patient with cardiomegaly and bilateral small to moderate-sized pleural effusion.  She also with groundglass and solid density 2.2 cm nodule to right apex, with a solid component measuring 0.9 cm, well this may be infectious in nature, a single part solid nodule with the solid component measuring greater than 6 mm should be considered suspicious for malignancy   family request to have patient admitted under Dr. Hernández   Case discussed with attending Dr. Hernández

## 2025-01-23 DIAGNOSIS — Z29.9 ENCOUNTER FOR PROPHYLACTIC MEASURES, UNSPECIFIED: ICD-10-CM

## 2025-01-23 DIAGNOSIS — J10.1 INFLUENZA DUE TO OTHER IDENTIFIED INFLUENZA VIRUS WITH OTHER RESPIRATORY MANIFESTATIONS: ICD-10-CM

## 2025-01-23 DIAGNOSIS — J96.01 ACUTE RESPIRATORY FAILURE WITH HYPOXIA: ICD-10-CM

## 2025-01-23 DIAGNOSIS — I48.91 UNSPECIFIED ATRIAL FIBRILLATION: ICD-10-CM

## 2025-01-23 DIAGNOSIS — J18.9 PNEUMONIA, UNSPECIFIED ORGANISM: ICD-10-CM

## 2025-01-23 DIAGNOSIS — I50.9 HEART FAILURE, UNSPECIFIED: ICD-10-CM

## 2025-01-23 DIAGNOSIS — I10 ESSENTIAL (PRIMARY) HYPERTENSION: ICD-10-CM

## 2025-01-23 DIAGNOSIS — R91.1 SOLITARY PULMONARY NODULE: ICD-10-CM

## 2025-01-23 DIAGNOSIS — R56.9 UNSPECIFIED CONVULSIONS: ICD-10-CM

## 2025-01-23 LAB
ANION GAP SERPL CALC-SCNC: 7 MMOL/L — SIGNIFICANT CHANGE UP (ref 5–17)
BASOPHILS # BLD AUTO: 0.01 K/UL — SIGNIFICANT CHANGE UP (ref 0–0.2)
BASOPHILS NFR BLD AUTO: 0.3 % — SIGNIFICANT CHANGE UP (ref 0–2)
BUN SERPL-MCNC: 13 MG/DL — SIGNIFICANT CHANGE UP (ref 7–18)
CALCIUM SERPL-MCNC: 8.5 MG/DL — SIGNIFICANT CHANGE UP (ref 8.4–10.5)
CHLORIDE SERPL-SCNC: 105 MMOL/L — SIGNIFICANT CHANGE UP (ref 96–108)
CO2 SERPL-SCNC: 28 MMOL/L — SIGNIFICANT CHANGE UP (ref 22–31)
CREAT SERPL-MCNC: 0.58 MG/DL — SIGNIFICANT CHANGE UP (ref 0.5–1.3)
EGFR: 83 ML/MIN/1.73M2 — SIGNIFICANT CHANGE UP
EOSINOPHIL # BLD AUTO: 0.09 K/UL — SIGNIFICANT CHANGE UP (ref 0–0.5)
EOSINOPHIL NFR BLD AUTO: 2.7 % — SIGNIFICANT CHANGE UP (ref 0–6)
GLUCOSE SERPL-MCNC: 95 MG/DL — SIGNIFICANT CHANGE UP (ref 70–99)
HCT VFR BLD CALC: 34.4 % — LOW (ref 34.5–45)
HGB BLD-MCNC: 10.8 G/DL — LOW (ref 11.5–15.5)
IMM GRANULOCYTES NFR BLD AUTO: 0 % — SIGNIFICANT CHANGE UP (ref 0–0.9)
LYMPHOCYTES # BLD AUTO: 0.68 K/UL — LOW (ref 1–3.3)
LYMPHOCYTES # BLD AUTO: 20.7 % — SIGNIFICANT CHANGE UP (ref 13–44)
MCHC RBC-ENTMCNC: 27.3 PG — SIGNIFICANT CHANGE UP (ref 27–34)
MCHC RBC-ENTMCNC: 31.4 G/DL — LOW (ref 32–36)
MCV RBC AUTO: 86.9 FL — SIGNIFICANT CHANGE UP (ref 80–100)
MONOCYTES # BLD AUTO: 0.35 K/UL — SIGNIFICANT CHANGE UP (ref 0–0.9)
MONOCYTES NFR BLD AUTO: 10.7 % — SIGNIFICANT CHANGE UP (ref 2–14)
NEUTROPHILS # BLD AUTO: 2.15 K/UL — SIGNIFICANT CHANGE UP (ref 1.8–7.4)
NEUTROPHILS NFR BLD AUTO: 65.6 % — SIGNIFICANT CHANGE UP (ref 43–77)
NRBC # BLD: 0 /100 WBCS — SIGNIFICANT CHANGE UP (ref 0–0)
NRBC BLD-RTO: 0 /100 WBCS — SIGNIFICANT CHANGE UP (ref 0–0)
PLATELET # BLD AUTO: 75 K/UL — LOW (ref 150–400)
POTASSIUM SERPL-MCNC: 3.6 MMOL/L — SIGNIFICANT CHANGE UP (ref 3.5–5.3)
POTASSIUM SERPL-SCNC: 3.6 MMOL/L — SIGNIFICANT CHANGE UP (ref 3.5–5.3)
PROCALCITONIN SERPL-MCNC: 0.1 NG/ML — SIGNIFICANT CHANGE UP (ref 0.02–0.1)
RBC # BLD: 3.96 M/UL — SIGNIFICANT CHANGE UP (ref 3.8–5.2)
RBC # FLD: 14.8 % — HIGH (ref 10.3–14.5)
SODIUM SERPL-SCNC: 140 MMOL/L — SIGNIFICANT CHANGE UP (ref 135–145)
WBC # BLD: 3.28 K/UL — LOW (ref 3.8–10.5)
WBC # FLD AUTO: 3.28 K/UL — LOW (ref 3.8–10.5)

## 2025-01-23 RX ORDER — METHYLPREDNISOLONE ACETATE 40 MG/ML
40 VIAL (ML) INJECTION
Refills: 0 | Status: COMPLETED | OUTPATIENT
Start: 2025-01-23 | End: 2025-01-25

## 2025-01-23 RX ORDER — OSELTAMIVIR PHOSPHATE 75 MG/1
75 CAPSULE ORAL ONCE
Refills: 0 | Status: COMPLETED | OUTPATIENT
Start: 2025-01-23 | End: 2025-01-23

## 2025-01-23 RX ORDER — OXYBUTYNIN CHLORIDE 5 MG/1
5 TABLET, EXTENDED RELEASE ORAL DAILY
Refills: 0 | Status: DISCONTINUED | OUTPATIENT
Start: 2025-01-23 | End: 2025-01-31

## 2025-01-23 RX ORDER — OXYBUTYNIN CHLORIDE 5 MG/1
1 TABLET, EXTENDED RELEASE ORAL
Refills: 0 | DISCHARGE

## 2025-01-23 RX ORDER — ACETAMINOPHEN 160 MG/5ML
650 SUSPENSION ORAL EVERY 6 HOURS
Refills: 0 | Status: DISCONTINUED | OUTPATIENT
Start: 2025-01-23 | End: 2025-01-31

## 2025-01-23 RX ORDER — METOPROLOL SUCCINATE 25 MG
25 TABLET, EXTENDED RELEASE 24 HR ORAL
Refills: 0 | Status: DISCONTINUED | OUTPATIENT
Start: 2025-01-23 | End: 2025-01-24

## 2025-01-23 RX ORDER — METOPROLOL SUCCINATE 25 MG
1 TABLET, EXTENDED RELEASE 24 HR ORAL
Refills: 0 | DISCHARGE

## 2025-01-23 RX ORDER — OSELTAMIVIR PHOSPHATE 75 MG/1
75 CAPSULE ORAL
Refills: 0 | Status: DISCONTINUED | OUTPATIENT
Start: 2025-01-23 | End: 2025-01-23

## 2025-01-23 RX ORDER — LOSARTAN POTASSIUM 100 MG
1 TABLET ORAL
Refills: 0 | DISCHARGE

## 2025-01-23 RX ORDER — APIXABAN 5 MG/1
2.5 TABLET, FILM COATED ORAL EVERY 12 HOURS
Refills: 0 | Status: DISCONTINUED | OUTPATIENT
Start: 2025-01-23 | End: 2025-01-31

## 2025-01-23 RX ORDER — OSELTAMIVIR PHOSPHATE 75 MG/1
30 CAPSULE ORAL
Refills: 0 | Status: DISCONTINUED | OUTPATIENT
Start: 2025-01-23 | End: 2025-01-29

## 2025-01-23 RX ORDER — ESOMEPRAZOLE MAGNESIUM 20 MG/1
1 CAPSULE, DELAYED RELEASE ORAL
Refills: 0 | DISCHARGE

## 2025-01-23 RX ORDER — SERTRALINE HCL 100 MG
50 TABLET ORAL DAILY
Refills: 0 | Status: DISCONTINUED | OUTPATIENT
Start: 2025-01-23 | End: 2025-01-31

## 2025-01-23 RX ORDER — SERTRALINE HCL 100 MG
1 TABLET ORAL
Refills: 0 | DISCHARGE

## 2025-01-23 RX ORDER — IPRATROPIUM BROMIDE AND ALBUTEROL SULFATE .5; 2.5 MG/3ML; MG/3ML
3 SOLUTION RESPIRATORY (INHALATION) EVERY 6 HOURS
Refills: 0 | Status: DISCONTINUED | OUTPATIENT
Start: 2025-01-23 | End: 2025-01-31

## 2025-01-23 RX ORDER — LACOSAMIDE 200 MG/1
100 TABLET, FILM COATED ORAL
Refills: 0 | Status: DISCONTINUED | OUTPATIENT
Start: 2025-01-23 | End: 2025-01-31

## 2025-01-23 RX ORDER — OXYBUTYNIN CHLORIDE 5 MG/1
5 TABLET, EXTENDED RELEASE ORAL DAILY
Refills: 0 | Status: DISCONTINUED | OUTPATIENT
Start: 2025-01-23 | End: 2025-01-23

## 2025-01-23 RX ORDER — AZITHROMYCIN DIHYDRATE 500 MG/1
500 TABLET, FILM COATED ORAL EVERY 24 HOURS
Refills: 0 | Status: DISCONTINUED | OUTPATIENT
Start: 2025-01-23 | End: 2025-01-27

## 2025-01-23 RX ORDER — PANTOPRAZOLE 20 MG/1
40 TABLET, DELAYED RELEASE ORAL
Refills: 0 | Status: DISCONTINUED | OUTPATIENT
Start: 2025-01-23 | End: 2025-01-31

## 2025-01-23 RX ORDER — CEFTRIAXONE 250 MG/1
1000 INJECTION, POWDER, FOR SOLUTION INTRAMUSCULAR; INTRAVENOUS EVERY 24 HOURS
Refills: 0 | Status: COMPLETED | OUTPATIENT
Start: 2025-01-23 | End: 2025-01-26

## 2025-01-23 RX ORDER — OSELTAMIVIR PHOSPHATE 75 MG/1
CAPSULE ORAL
Refills: 0 | Status: DISCONTINUED | OUTPATIENT
Start: 2025-01-23 | End: 2025-01-29

## 2025-01-23 RX ORDER — APIXABAN 5 MG/1
2.5 TABLET, FILM COATED ORAL EVERY 12 HOURS
Refills: 0 | Status: DISCONTINUED | OUTPATIENT
Start: 2025-01-23 | End: 2025-01-23

## 2025-01-23 RX ADMIN — IPRATROPIUM BROMIDE AND ALBUTEROL SULFATE 3 MILLILITER(S): .5; 2.5 SOLUTION RESPIRATORY (INHALATION) at 21:52

## 2025-01-23 RX ADMIN — IPRATROPIUM BROMIDE AND ALBUTEROL SULFATE 3 MILLILITER(S): .5; 2.5 SOLUTION RESPIRATORY (INHALATION) at 15:40

## 2025-01-23 RX ADMIN — OSELTAMIVIR PHOSPHATE 75 MILLIGRAM(S): 75 CAPSULE ORAL at 05:18

## 2025-01-23 RX ADMIN — Medication 25 MILLIGRAM(S): at 18:12

## 2025-01-23 RX ADMIN — LACOSAMIDE 100 MILLIGRAM(S): 200 TABLET, FILM COATED ORAL at 22:30

## 2025-01-23 RX ADMIN — Medication 600 MILLIGRAM(S): at 18:12

## 2025-01-23 RX ADMIN — ACETAMINOPHEN 650 MILLIGRAM(S): 160 SUSPENSION ORAL at 19:31

## 2025-01-23 RX ADMIN — Medication 20 MILLIGRAM(S): at 18:14

## 2025-01-23 RX ADMIN — ACETAMINOPHEN 650 MILLIGRAM(S): 160 SUSPENSION ORAL at 18:11

## 2025-01-23 RX ADMIN — Medication 40 MILLIGRAM(S): at 12:23

## 2025-01-23 RX ADMIN — APIXABAN 2.5 MILLIGRAM(S): 5 TABLET, FILM COATED ORAL at 18:12

## 2025-01-23 RX ADMIN — Medication 40 MILLIGRAM(S): at 18:12

## 2025-01-23 NOTE — H&P ADULT - ASSESSMENT
89-year-old female with history of A-fib on Eliquis, HTN, seizures, GERD, anxiety depression.  As per granddaughter, patient with leg edema/SOB 2 months ago.  Chest x-ray showed pleural effusion.  She was evaluated by cardiologist 3 weeks ago, given Lasix 20 mg for total 6 days with improvement.  Patient now presents to ED with sick contact at home, almost everybody had the flu. patient with coughing, chest congestion, coughing up clear sputum, runny nose for past 4 days, worse past 2 days with SOB, weakness.  She denies chest pain, vomiting, appetite fair.  Patient went to urgent care and was diagnosed with flu A.  Granddaughter noticed patient's pulse ox dropped to upper 80s. In ED, imaging noted with cardiomegaly and bilateral small to moderate-sized pleural effusion. Groundglass and solid density 2.2 cm nodule to right apex, with a solid component measuring 0.9 cm, well this may be infectious in nature, a single part solid nodule with the solid component measuring greater than 6 mm should be considered suspicious for malignancy. FLu A +. Adm to medicine for AHRF 2/2 Flu A vs PNA vs CHF Exacerbation.     CONFIRM MED REC IN A. RESUMED MEDS PER MARCH 2023 D/C PAPERWORK???? 89-year-old female, AAOx2-3,  with history of A-fib on Eliquis, HTN, seizures, GERD, anxiety, depression who presents to the ED for cough, cold, hypoxia.  Adm to medicine for AHRF 2/2 Flu A vs PNA.       CONFIRM MED REC IN AM AS PATIENT COULD NOT RECALL MEDICATIONS IN ED. States grand daughter would know her pharmacy information. Medications in [facility] noted from 2023.

## 2025-01-23 NOTE — CONSULT NOTE ADULT - PROBLEM SELECTOR RECOMMENDATION 4
Tele monitoring  Con with meds  Cardio eval. Cardiomegaly and B/L PE most likely 2/2 CHF  lasix trial  Echo  Cardio eval

## 2025-01-23 NOTE — CONSULT NOTE ADULT - ASSESSMENT
89-year-old female, AAOx2-3,  with history of A-fib on Eliquis, HTN, seizures, GERD, anxiety, depression who presents to the ED for cough, cold, hypoxia,afib,Flu,pneumonia.  1.Tele monitoring.  2.Afib-Add eliquis 2.5mg bid, lopressor 25mg bid.  3.Flu-ABX.  4.Pneumonia-ABX.  5.Pleural effusion-IV lasix 20mg bid.  6.Echocardiogram.  7.PPI.  8.Check TSH.

## 2025-01-23 NOTE — ED ADULT NURSE REASSESSMENT NOTE - NS ED NURSE REASSESS COMMENT FT1
daughter Waleska Peña [phone]
pt maintained in stable condition.  NAD noted.  labs sent.  s/p CXR and CT.  started on IV abx for possible PNA.  vitals WNL.  will cont to monitor
pt maintained in stable condition.  NAD noted.  to be admitted for PNA and fluid overload.  awaiting bed assignment.
pt resting comfortably.  given PO tamiflu for + FLU A.  no distress noted, awaiting bed assignment
pt resting comfortably in NAD.  vitals stable.  awaiting bed assignment. will cont to monitor

## 2025-01-23 NOTE — H&P ADULT - HISTORY OF PRESENT ILLNESS
89-year-old female with history of A-fib on Eliquis, HTN, seizures, GERD, anxiety depression.  As per granddaughter, patient with leg edema/SOB 2 months ago.  Chest x-ray showed pleural effusion.  She was evaluated by cardiologist 3 weeks ago, given Lasix 20 mg for total 6 days with improvement.  Patient now presents to ED with sick contact at home, almost everybody had the flu. patient with coughing, chest congestion, coughing up clear sputum, runny nose for past 4 days, worse past 2 days with SOB, weakness.  She denies chest pain, vomiting, appetite fair.  Patient went to urgent care and was diagnosed with flu A.  Granddaughter noticed patient's pulse ox dropped to upper 80s.     patient with cardiomegaly and bilateral small to moderate-sized pleural effusion.  She also with groundglass and solid density 2.2 cm nodule to right apex, with a solid component measuring 0.9 cm, well this may be infectious in nature, a single part solid nodule with the solid component measuring greater than 6 mm should be considered suspicious for malignancy   89-year-old female with history of A-fib on Eliquis, HTN, seizures, GERD, anxiety depression.  As per granddaughter, patient with leg edema/SOB 2 months ago.  Chest x-ray showed pleural effusion.  She was evaluated by cardiologist 3 weeks ago, given Lasix 20 mg for total 6 days with improvement.  Patient now presents to ED with sick contact at home, almost everybody had the flu. patient with coughing, chest congestion, coughing up clear sputum, runny nose for past 4 days, worse past 2 days with SOB, weakness.  She denies chest pain, vomiting, appetite fair.  Patient went to urgent care and was diagnosed with flu A.  Granddaughter noticed patient's pulse ox dropped to upper 80s. In ED, imaging noted with cardiomegaly and bilateral small to moderate-sized pleural effusion. Groundglass and solid density 2.2 cm nodule to right apex, with a solid component measuring 0.9 cm, well this may be infectious in nature, a single part solid nodule with the solid component measuring greater than 6 mm should be considered suspicious for malignancy. FLu A +. Adm to medicine for AHRF 2/2 Flu A vs PNA vs CHF Exacerbation.    89-year-old female, AAOx2-3,  with history of A-fib on Eliquis, HTN, seizures, GERD, anxiety, depression who presents to the ED for cough, cold, hypoxia. As per granddaughter, patient had developed leg edema/SOB 2 months ago. At that time, Chest x-ray showed pleural effusion.She was evaluated by cardiologist 3 weeks ago, given Lasix 20 mg for total 6 days with improvement. Patient now presents to ED with known Flu A positive sick contacts at home. Patient p/w clear sputum productive coughing, chest congestion, runny nose, for past 4 days, worse over the past 2 days with SOB and weakness. She denies chest pain, vomiting, loss of appetite. Patient went to urgent care and was diagnosed with flu A.  However, later on at home, granddaughter noticed patient's pulse ox dropped to upper 80s. In ED, imaging noted with cardiomegaly and bilateral small to moderate-sized pleural effusion. Groundglass and solid density 2.2 cm nodule to right apex, with a solid component measuring 0.9 cm. Flu A +. Started on 2 L NC for hypoxia. Adm to medicine for AHRF 2/2 Flu A vs PNA.

## 2025-01-23 NOTE — H&P ADULT - PROBLEM SELECTOR PLAN 1
AHRF, FLU A +  started Azithro, CTX  f/u PNA work up, blood cultures  wean NC as tolerated  Tamiflu p/w hypoxia, cough, cold  req 2 L NC in ED  FLU A+  CT Chest with ground glass and signs of consolidation      started Azithro, CTX  f/u PNA work up, blood cultures  wean NC as tolerated  Tamiflu started   droplet precautions p/w hypoxia, cough, cold  req 2 L NC in ED  FLU A+  CT Chest with ground glass and signs of consolidation    f/u TTE  started Azithro, CTX  f/u PNA work up, blood cultures  wean NC as tolerated  Tamiflu started   droplet precautions

## 2025-01-23 NOTE — CONSULT NOTE ADULT - PROBLEM SELECTOR RECOMMENDATION 3
Quantiferon as TB ibrahim test  Nodify as OP  PFTs as OP Quantiferon  TB gold test  Nodify Testing as OP  PFTs as OP

## 2025-01-23 NOTE — H&P ADULT - NSHPPHYSICALEXAM_GEN_ALL_CORE
Vital Signs Last 24 Hrs  T(C): 36.8 (23 Jan 2025 05:18), Max: 37 (22 Jan 2025 18:17)  T(F): 98.2 (23 Jan 2025 05:18), Max: 98.6 (22 Jan 2025 18:17)  HR: 81 (23 Jan 2025 05:18) (72 - 92)  BP: 168/71 (23 Jan 2025 05:18) (135/64 - 174/89)  BP(mean): --  RR: 19 (23 Jan 2025 05:18) (17 - 20)  SpO2: 99% (23 Jan 2025 05:18) (97% - 100%)    Parameters below as of 23 Jan 2025 05:18  Patient On (Oxygen Delivery Method): nasal cannula  O2 Flow (L/min): 2    PHYSICAL EXAM:  GENERAL: NAD, speaks in full sentences, no signs of respiratory distress  HEAD:  Atraumatic, Normocephalic  EYES: EOMI, PERRLA, conjunctiva and sclera clear  NECK: Supple, No JVD  CHEST/LUNG: +diffuse rhonchi and rales b/l   HEART: Irregular rate and rhythm; No murmurs;   ABDOMEN: Soft, Nontender, Nondistended; Bowel sounds present; No guarding  EXTREMITIES:  2+ Peripheral Pulses, No cyanosis or edema  PSYCH: AAOx2  NEUROLOGY: non-focal  SKIN: No rashes or lesions

## 2025-01-23 NOTE — CONSULT NOTE ADULT - SUBJECTIVE AND OBJECTIVE BOX
INTERNAL MEDICINE CONSULT NOTE      MONCHO GAVREY  MRN-[id number]      HISTORY OF PRESENT ILLNESS:  89-year-old female, AAOx2-3,  with history of A-fib on Eliquis, HTN, seizures, GERD, anxiety, depression who presents to the ED for cough, cold, hypoxia. As per granddaughter, patient had developed leg edema/SOB 2 months ago. At that time, Chest x-ray showed pleural effusion.She was evaluated by cardiologist 3 weeks ago, given Lasix 20 mg for total 6 days with improvement. Patient now presents to ED with known Flu A positive sick contacts at home. Patient p/w clear sputum productive coughing, chest congestion, runny nose, for past 4 days, worse over the past 2 days with SOB and weakness. She denies chest pain, vomiting, loss of appetite. Patient went to urgent care and was diagnosed with flu A.  However, later on at home, granddaughter noticed patient's pulse ox dropped to upper 80s. In ED, imaging noted with cardiomegaly and bilateral small to moderate-sized pleural effusion. Groundglass and solid density 2.2 cm nodule to right apex, with a solid component measuring 0.9 cm. Flu A +. Started on 2 L NC for hypoxia. Adm to medicine for AHRF 2/2 Flu A vs PNA.       MEDICATIONS  (STANDING):  azithromycin  IVPB 500 milliGRAM(s) IV Intermittent every 24 hours  cefTRIAXone   IVPB 1000 milliGRAM(s) IV Intermittent every 24 hours  oseltamivir 30 milliGRAM(s) Oral two times a day  oseltamivir          MEDICATIONS  (PRN):  acetaminophen     Tablet .. 650 milliGRAM(s) Oral every 6 hours PRN Temp greater or equal to 38C (100.4F), Mild Pain (1 - 3)  benzonatate 100 milliGRAM(s) Oral every 8 hours PRN Cough      Allergies    No Known Drug Allergies  Pineapple (Short breath)    Intolerances        PAST MEDICAL & SURGICAL HISTORY:  HTN (hypertension)      Epilepsy      Atrial flutter      Urinary incontinence      Anxiety      GERD (gastroesophageal reflux disease)      No significant past surgical history          FAMILY HISTORY:      SOCIAL HISTORY  Smoking History:     REVIEW OF SYSTEMS:    CONSTITUTIONAL:  No fevers, chills, sweats    HEENT:  Eyes:  No diplopia or blurred vision. ENT:  No earache, sore throat or runny nose.    CARDIOVASCULAR:  No pressure, squeezing, tightness, or heaviness about the chest; no palpitations.    RESPIRATORY:  Per HPI    GASTROINTESTINAL:  No abdominal pain, nausea, vomiting or diarrhea.    GENITOURINARY:  No dysuria, frequency or urgency.    NEUROLOGIC:  No paresthesias, fasciculations, seizures or weakness.    PSYCHIATRIC:  No disorder of thought or mood.    Vital Signs Last 24 Hrs  T(C): 36.8 (23 Jan 2025 05:18), Max: 37 (22 Jan 2025 18:17)  T(F): 98.2 (23 Jan 2025 05:18), Max: 98.6 (22 Jan 2025 18:17)  HR: 81 (23 Jan 2025 05:18) (72 - 92)  BP: 168/71 (23 Jan 2025 05:18) (135/64 - 174/89)  BP(mean): --  RR: 19 (23 Jan 2025 05:18) (17 - 20)  SpO2: 99% (23 Jan 2025 05:18) (97% - 100%)    Parameters below as of 23 Jan 2025 05:18  Patient On (Oxygen Delivery Method): nasal cannula  O2 Flow (L/min): 2    I&O's Detail      PHYSICAL EXAMINATION:    GENERAL: The patient is a well-developed, well-nourished _____in no apparent distress.     HEENT: Head is normocephalic and atraumatic. Extraocular muscles are intact. Mucous membranes are moist.     NECK: Supple.     LUNGS: Clear to auscultation without wheezing, rales, or rhonchi. Respirations unlabored    HEART: Regular rate and rhythm without murmur.    ABDOMEN: Soft, nontender, and nondistended.  No hepatosplenomegaly is noted.    EXTREMITIES: Without any cyanosis, clubbing, rash, lesions or edema.    NEUROLOGIC: Grossly intact.      LABS:                        10.8   3.28  )-----------( 75       ( 23 Jan 2025 05:20 )             34.4     01-23    140  |  105  |  13  ----------------------------<  95  3.6   |  28  |  0.58    Ca    8.5      23 Jan 2025 05:20    TPro  7.1  /  Alb  3.6  /  TBili  0.8  /  DBili  x   /  AST  27  /  ALT  19  /  AlkPhos  130[H]  01-22      Urinalysis Basic - ( 23 Jan 2025 05:20 )    Color: x / Appearance: x / SG: x / pH: x  Gluc: 95 mg/dL / Ketone: x  / Bili: x / Urobili: x   Blood: x / Protein: x / Nitrite: x   Leuk Esterase: x / RBC: x / WBC x   Sq Epi: x / Non Sq Epi: x / Bacteria: x                Lactate, Blood: 1.3 mmol/L (01-22-25 @ 20:57)        MICROBIOLOGY:    RADIOLOGY & ADDITIONAL STUDIES:    CXR:    Ct scan chest;    ekg;    echo: INTERNAL MEDICINE CONSULT NOTE      MONCHO GARVEY  MRN-[id number]      HISTORY OF PRESENT ILLNESS:  89-year-old female, AAOx2-3,  with history of A-fib on Eliquis, HTN, seizures, GERD, anxiety, depression who presents to the ED for cough, cold, hypoxia. As per granddaughter, patient had developed leg edema/SOB 2 months ago. At that time, Chest x-ray showed pleural effusion.She was evaluated by cardiologist 3 weeks ago, given Lasix 20 mg for total 6 days with improvement. Patient now presents to ED with known Flu A positive sick contacts at home. Patient p/w clear sputum productive coughing, chest congestion, runny nose, for past 4 days, worse over the past 2 days with SOB and weakness. She denies chest pain, vomiting, loss of appetite. Patient went to urgent care and was diagnosed with flu A.  However, later on at home, granddaughter noticed patient's pulse ox dropped to upper 80s. In ED, imaging noted with cardiomegaly and bilateral small to moderate-sized pleural effusion. Groundglass and solid density 2.2 cm nodule to right apex, with a solid component measuring 0.9 cm. Flu A +. Started on 2 L NC for hypoxia. Adm to medicine for AHRF 2/2 Flu A vs PNA.     She is awake, alert, laying in bed. She c/o cough.    MEDICATIONS  (STANDING):  azithromycin  IVPB 500 milliGRAM(s) IV Intermittent every 24 hours  cefTRIAXone   IVPB 1000 milliGRAM(s) IV Intermittent every 24 hours  oseltamivir 30 milliGRAM(s) Oral two times a day  oseltamivir          MEDICATIONS  (PRN):  acetaminophen     Tablet .. 650 milliGRAM(s) Oral every 6 hours PRN Temp greater or equal to 38C (100.4F), Mild Pain (1 - 3)  benzonatate 100 milliGRAM(s) Oral every 8 hours PRN Cough      Allergies    No Known Drug Allergies  Pineapple (Short breath)    Intolerances        PAST MEDICAL & SURGICAL HISTORY:  HTN (hypertension)      Epilepsy      Atrial flutter      Urinary incontinence      Anxiety      GERD (gastroesophageal reflux disease)      No significant past surgical history          FAMILY HISTORY:      SOCIAL HISTORY  Smoking History:     REVIEW OF SYSTEMS:    CONSTITUTIONAL:  No fevers, chills, sweats    HEENT:  Eyes:  No diplopia or blurred vision. ENT:  No earache, sore throat or runny nose.    CARDIOVASCULAR:  No pressure, squeezing, tightness, or heaviness about the chest; no palpitations.    RESPIRATORY:  Per HPI    GASTROINTESTINAL:  No abdominal pain, nausea, vomiting or diarrhea.    GENITOURINARY:  No dysuria, frequency or urgency.    NEUROLOGIC:  No paresthesias, fasciculations, seizures or weakness.    PSYCHIATRIC:  No disorder of thought or mood.    Vital Signs Last 24 Hrs  T(C): 36.8 (23 Jan 2025 05:18), Max: 37 (22 Jan 2025 18:17)  T(F): 98.2 (23 Jan 2025 05:18), Max: 98.6 (22 Jan 2025 18:17)  HR: 81 (23 Jan 2025 05:18) (72 - 92)  BP: 168/71 (23 Jan 2025 05:18) (135/64 - 174/89)  BP(mean): --  RR: 19 (23 Jan 2025 05:18) (17 - 20)  SpO2: 99% (23 Jan 2025 05:18) (97% - 100%)    Parameters below as of 23 Jan 2025 05:18  Patient On (Oxygen Delivery Method): nasal cannula  O2 Flow (L/min): 2    I&O's Detail      PHYSICAL EXAMINATION:    GENERAL: The patient is a well-developed, well-nourished _____in no apparent distress.     HEENT: Head is normocephalic and atraumatic. Extraocular muscles are intact. Mucous membranes are moist.     NECK: Supple.     LUNGS: Rhonchi B/L    HEART: Regular rate and rhythm without murmur.    ABDOMEN: Soft, nontender, and nondistended.  No hepatosplenomegaly is noted.    EXTREMITIES: Without any cyanosis, clubbing, rash, lesions or edema.    NEUROLOGIC: Grossly intact.      LABS:                        10.8   3.28  )-----------( 75       ( 23 Jan 2025 05:20 )             34.4     01-23    140  |  105  |  13  ----------------------------<  95  3.6   |  28  |  0.58    Ca    8.5      23 Jan 2025 05:20    TPro  7.1  /  Alb  3.6  /  TBili  0.8  /  DBili  x   /  AST  27  /  ALT  19  /  AlkPhos  130[H]  01-22      Urinalysis Basic - ( 23 Jan 2025 05:20 )    Color: x / Appearance: x / SG: x / pH: x  Gluc: 95 mg/dL / Ketone: x  / Bili: x / Urobili: x   Blood: x / Protein: x / Nitrite: x   Leuk Esterase: x / RBC: x / WBC x   Sq Epi: x / Non Sq Epi: x / Bacteria: x            Lactate, Blood: 1.3 mmol/L (01-22-25 @ 20:57)    FluA/FluB/RSV/COVID PCR (01.22.25 @ 20:40)   SARS-CoV-2 Result: NotDetecInfluenza A Result: Detected  Influenza B Result: NotDetec  Resp Syn Virus Result: NotDetec  Influenza A Result: Detected (01.22.25 @ 20:40)   MICROBIOLOGY:    RADIOLOGY & ADDITIONAL STUDIES:    CXR:    Ct scan chest;  < from: CT Chest No Cont (01.22.25 @ 21:21) >  IMPRESSION:  Cardiomegaly and bilateral small to moderate-sized pleural effusions.    Groundglass and solid density 2.2 cm nodule the right apex, with a solid   component measuring 0.9 cm. while this may be infectious in nature, a   single part solid nodule with the solid component measuring greater than   6 mm should be considered suspicious for malignancy. Short-term 3 month   follow-up versus PET CT or histopathologic correlation is advised.    Additional findings as above.      < end of copied text >  ekg;    echo:

## 2025-01-23 NOTE — CONSULT NOTE ADULT - PROBLEM SELECTOR RECOMMENDATION 9
panculture  antibiotics  monitor temp and WBC  Follow up CXR. Droplets precautions  Analgesics PRN  Robitussin 10 cc po TID PRN  Tamiflu 30 mgs po daily x 5 days.  CXR follow up DVT PPX.

## 2025-01-23 NOTE — CONSULT NOTE ADULT - PROBLEM SELECTOR RECOMMENDATION 2
Oxygen supp to keep SPO2 90% or above  Duoneb 1 unit dose neb Q6H PRN  Solumedrol   Taper FIO2 as Feasible  follow up ABGs  pulm toilet  Asp precautions  CXR follow up Oxygen supp to keep SPO2 90% or above  Duoneb 1 unit dose neb Q6H PRN  Solumedrol IV  Taper FIO2 as Feasible  follow up ABGs  pulm toilet  Asp precautions  CXR follow up

## 2025-01-23 NOTE — ED ADULT NURSE REASSESSMENT NOTE - BP NONINVASIVE SYSTOLIC (MM HG)
Allison was seen today for back pain.    Diagnoses and all orders for this visit:    Acute upper back pain    Cervical paraspinous muscle spasm    Other orders  -     cyclobenzaprine (FLEXERIL) 10 MG tablet; Take 1 tablet by mouth nightly as needed for Muscle spasms.  -     prochlorperazine (COMPAZINE) 5 MG tablet; Take 1 tablet by mouth every 8 hours as needed for Nausea.  -     naproxen sodium (ALEVE) 220 MG tablet; Take 1 tablet by mouth 3 times daily (with meals).      Heat will help asa well  If radiation of pain to arms or numbness and tingling in arms or hands happen need reevaluation   Your posture is what make this recurrent should do stretches and adjust the position and computer location as you type     135

## 2025-01-23 NOTE — CONSULT NOTE ADULT - SUBJECTIVE AND OBJECTIVE BOX
Date of Service  01-23-25 @ 12:55    CHIEF COMPLAINT:Patient is a 89y old  Female who presents with a chief complaint of AHRF 2/2 Flu vs PNA.      HPI:  89-year-old female, AAOx2-3,  with history of A-fib on Eliquis, HTN, seizures, GERD, anxiety, depression who presents to the ED for cough, cold, hypoxia. As per granddaughter, patient had developed leg edema/SOB 2 months ago. At that time, Chest x-ray showed pleural effusion.She was evaluated by cardiologist 3 weeks ago, given Lasix 20 mg for total 6 days with improvement. Patient now presents to ED with known Flu A positive sick contacts at home. Patient p/w clear sputum productive coughing, chest congestion, runny nose, for past 4 days, worse over the past 2 days with SOB and weakness. She denies chest pain, vomiting, loss of appetite. Patient went to urgent care and was diagnosed with flu A.  However, later on at home, granddaughter noticed patient's pulse ox dropped to upper 80s. In ED, imaging noted with cardiomegaly and bilateral small to moderate-sized pleural effusion. Groundglass and solid density 2.2 cm nodule to right apex, with a solid component measuring 0.9 cm. Flu A +. Started on 2 L NC for hypoxia. Adm to medicine for AHRF 2/2 Flu A vs PNA.  (23 Jan 2025 02:28)      PAST MEDICAL & SURGICAL HISTORY:  HTN (hypertension)      Epilepsy      Atrial flutter      Urinary incontinence      Anxiety      GERD (gastroesophageal reflux disease)                MEDICATIONS  (STANDING):  albuterol/ipratropium for Nebulization 3 milliLiter(s) Nebulizer every 6 hours  azithromycin  IVPB 500 milliGRAM(s) IV Intermittent every 24 hours  cefTRIAXone   IVPB 1000 milliGRAM(s) IV Intermittent every 24 hours  guaiFENesin  milliGRAM(s) Oral every 12 hours  methylPREDNISolone sodium succinate Injectable 40 milliGRAM(s) IV Push two times a day  oseltamivir 30 milliGRAM(s) Oral two times a day  oseltamivir        MEDICATIONS  (PRN):  acetaminophen     Tablet .. 650 milliGRAM(s) Oral every 6 hours PRN Temp greater or equal to 38C (100.4F), Mild Pain (1 - 3)  benzonatate 100 milliGRAM(s) Oral every 8 hours PRN Cough      FAMILY HISTORY:No hx of CAD      SOCIAL HISTORY:    [x ] Non-smoker    [x ] Alcohol-denies    Allergies    No Known Drug Allergies  Pineapple (Short breath)    Intolerances    	    REVIEW OF SYSTEMS:  CONSTITUTIONAL: No fever, weight loss, or fatigue  EYES: No eye pain, visual disturbances, or discharge  ENT:  No difficulty hearing, tinnitus, vertigo; No sinus or throat pain  NECK: No pain or stiffness  RESPIRATORY: No cough, wheezing, chills or hemoptysis; + Shortness of Breath  CARDIOVASCULAR: No chest pain, palpitations, passing out, dizziness, or leg swelling  GASTROINTESTINAL: No abdominal or epigastric pain. No nausea, vomiting, or hematemesis; No diarrhea or constipation. No melena or hematochezia.  GENITOURINARY: No dysuria, frequency, hematuria, or incontinence  NEUROLOGICAL: No headaches, memory loss, loss of strength, numbness, or tremors  SKIN: No itching, burning, rashes, or lesions   LYMPH Nodes: No enlarged glands  ENDOCRINE: No heat or cold intolerance; No hair loss  MUSCULOSKELETAL: No joint pain or swelling; No muscle, back, or extremity pain  PSYCHIATRIC: No depression, anxiety, mood swings, or difficulty sleeping  HEME/LYMPH: No easy bruising, or bleeding gums  ALLERGY AND IMMUNOLOGIC: No hives or eczema	        PHYSICAL EXAM:  T(C): 36.9 (01-23-25 @ 11:00), Max: 37 (01-22-25 @ 18:17)  HR: 94 (01-23-25 @ 11:00) (72 - 94)  BP: 137/73 (01-23-25 @ 11:00) (134/73 - 174/89)  RR: 18 (01-23-25 @ 11:00) (17 - 20)  SpO2: 98% (01-23-25 @ 11:00) (97% - 100%)  Wt(kg): --  I&O's Summary      Appearance: Normal	  HEENT:   Normal oral mucosa, PERRL, EOMI	  Lymphatic: No lymphadenopathy  Cardiovascular: Normal S1 S2, No JVD, No murmurs, No edema  Respiratory: B/L ronchi  Gastrointestinal:  Soft, Non-tender, + BS	  Skin: No rashes, No ecchymoses, No cyanosis	  Neurologic: Non-focal  Extremities: Normal range of motion, No clubbing, cyanosis or edema  Vascular: Peripheral pulses palpable 2+ bilaterally    	    ECG:  afib, q v1-v3	    LABS:	 	                            10.8   3.28  )-----------( 75       ( 23 Jan 2025 05:20 )             34.4     01-23    140  |  105  |  13  ----------------------------<  95  3.6   |  28  |  0.58    Ca    8.5      23 Jan 2025 05:20    TPro  7.1  /  Alb  3.6  /  TBili  0.8  /  DBili  x   /  AST  27  /  ALT  19  /  AlkPhos  130[H]  01-22    < from: CT Chest No Cont (01.22.25 @ 21:21) >  ACC: [id number] EXAM:  CT CHEST   ORDERED BY: STANFORD TOBAR     PROCEDURE DATE:  01/22/2025          INTERPRETATION:  CLINICAL INFORMATION: Coughing. Pleural effusion.    COMPARISON: None.    CONTRAST/COMPLICATIONS:  IV Contrast: NONE  Oral Contrast:NONE  .    PROCEDURE:  CT of the Chest was performed.  Sagittal and coronal reformats were performed.    FINDINGS:    LUNGS AND LARGE AIRWAYS: Patent central airways. Bilateral compressive   and linear atelectasis. There is a 2.2 cm right apical nodule comprised   of groundglass and solid density. The solid component measures 9 mm.   Question mild fibrotic changes peripherally.  PLEURA: There are small to moderate-sized bilateral pleural effusions.   Fluid is seen tracking along the right fissure.  VESSELS: Aortic calcifications. Coronary artery calcifications.  HEART: Heart size is enlarged. No pericardial effusion. Valvular   calcifications are noted.  MEDIASTINUM AND VERONIKA: No pathologically enlarged lymph nodes identified.   Increased number of nonenlarged mediastinal lymph nodes are appreciated..  CHEST WALL AND LOWER NECK: There is a 1.6 cm partially calcified right   thyroid nodule..  VISUALIZED UPPER ABDOMEN: Calcified granulomata of the liver appreciated.   There is a very small hiatal hernia. BONES: There is generalized   osteopenia. Degenerative changes of the spine appreciated. There is a   well marginated lucent lesion of the right scapula, which would better   evaluated with MRI. There is an exaggerated kyphosis.    IMPRESSION:  Cardiomegaly and bilateral small to moderate-sized pleural effusions.    Groundglass and solid density 2.2 cm nodule the right apex, with a solid   component measuring 0.9 cm. while this may be infectious in nature, a   single part solid nodule with the solid component measuring greater than   6 mm should be considered suspicious for malignancy. Short-term 3 month   follow-up versus PET CT or histopathologic correlation is advised.    Additional findings as above.    --- End of Report ---            DIRK NUNEZ M.D., Attending Radiologist  This document has been electronically signed. Jan 22 2025 10:00PM    < end of copied text >

## 2025-01-23 NOTE — H&P ADULT - NSHPLABSRESULTS_GEN_ALL_CORE
< from: CT Chest No Cont (01.22.25 @ 21:21) >      IMPRESSION:  Cardiomegaly and bilateral small to moderate-sized pleural effusions.    Groundglass and solid density 2.2 cm nodule the right apex, with a solid   component measuring 0.9 cm. while this may be infectious in nature, a   single part solid nodule with the solid component measuring greater than   6 mm should be considered suspicious for malignancy. Short-term 3 month   follow-up versus PET CT or histopathologic correlation is advised.    < end of copied text >

## 2025-01-23 NOTE — H&P ADULT - PROBLEM SELECTOR PLAN 3
CT with nodule  f/u outpt CT Chest: Ground glass and solid density 2.2 cm nodule the right apex, with a solid   component measuring 0.9 cm. while this may be infectious in nature, a   single part solid nodule with the solid component measuring greater than   6 mm should be considered suspicious for malignancy.   Short-term 3 month follow-up versus PET CT or histopathologic correlation is advised.  f/u outpt

## 2025-01-23 NOTE — PATIENT PROFILE ADULT - FALL HARM RISK - HARM RISK INTERVENTIONS
Assistance with ambulation/Assistance OOB with selected safe patient handling equipment/Communicate Risk of Fall with Harm to all staff/Discuss with provider need for PT consult/Monitor gait and stability/Provide patient with walking aids - walker, cane, crutches/Reinforce activity limits and safety measures with patient and family/Sit up slowly, dangle for a short time, stand at bedside before walking/Tailored Fall Risk Interventions/Visual Cue: Yellow wristband and red socks/Bed in lowest position, wheels locked, appropriate side rails in place/Call bell, personal items and telephone in reach/Instruct patient to call for assistance before getting out of bed or chair/Non-slip footwear when patient is out of bed/[place] to call system/Physically safe environment - no spills, clutter or unnecessary equipment/Purposeful Proactive Rounding/Room/bathroom lighting operational, light cord in reach

## 2025-01-24 DIAGNOSIS — Z75.8 OTHER PROBLEMS RELATED TO MEDICAL FACILITIES AND OTHER HEALTH CARE: ICD-10-CM

## 2025-01-24 LAB
A1C WITH ESTIMATED AVERAGE GLUCOSE RESULT: 5.8 % — HIGH (ref 4–5.6)
ALBUMIN SERPL ELPH-MCNC: 3.7 G/DL — SIGNIFICANT CHANGE UP (ref 3.5–5)
ALP SERPL-CCNC: 129 U/L — HIGH (ref 40–120)
ALT FLD-CCNC: 16 U/L DA — SIGNIFICANT CHANGE UP (ref 10–60)
ANION GAP SERPL CALC-SCNC: 14 MMOL/L — SIGNIFICANT CHANGE UP (ref 5–17)
AST SERPL-CCNC: 19 U/L — SIGNIFICANT CHANGE UP (ref 10–40)
BASOPHILS # BLD AUTO: 0 K/UL — SIGNIFICANT CHANGE UP (ref 0–0.2)
BASOPHILS NFR BLD AUTO: 0 % — SIGNIFICANT CHANGE UP (ref 0–2)
BILIRUB SERPL-MCNC: 0.7 MG/DL — SIGNIFICANT CHANGE UP (ref 0.2–1.2)
BUN SERPL-MCNC: 16 MG/DL — SIGNIFICANT CHANGE UP (ref 7–18)
CALCIUM SERPL-MCNC: 9.7 MG/DL — SIGNIFICANT CHANGE UP (ref 8.4–10.5)
CHLORIDE SERPL-SCNC: 97 MMOL/L — SIGNIFICANT CHANGE UP (ref 96–108)
CO2 SERPL-SCNC: 28 MMOL/L — SIGNIFICANT CHANGE UP (ref 22–31)
CREAT SERPL-MCNC: 0.96 MG/DL — SIGNIFICANT CHANGE UP (ref 0.5–1.3)
EGFR: 54 ML/MIN/1.73M2 — LOW
EOSINOPHIL # BLD AUTO: 0 K/UL — SIGNIFICANT CHANGE UP (ref 0–0.5)
EOSINOPHIL NFR BLD AUTO: 0 % — SIGNIFICANT CHANGE UP (ref 0–6)
ESTIMATED AVERAGE GLUCOSE: 120 MG/DL — HIGH (ref 68–114)
GLUCOSE SERPL-MCNC: 173 MG/DL — HIGH (ref 70–99)
HCT VFR BLD CALC: 44.3 % — SIGNIFICANT CHANGE UP (ref 34.5–45)
HGB BLD-MCNC: 14.3 G/DL — SIGNIFICANT CHANGE UP (ref 11.5–15.5)
IMM GRANULOCYTES NFR BLD AUTO: 0.3 % — SIGNIFICANT CHANGE UP (ref 0–0.9)
LYMPHOCYTES # BLD AUTO: 0.31 K/UL — LOW (ref 1–3.3)
LYMPHOCYTES # BLD AUTO: 8.9 % — LOW (ref 13–44)
M PNEUMO IGM SER-ACNC: 0.38 INDEX — SIGNIFICANT CHANGE UP (ref 0–0.9)
MAGNESIUM SERPL-MCNC: 2 MG/DL — SIGNIFICANT CHANGE UP (ref 1.6–2.6)
MANUAL SMEAR VERIFICATION: SIGNIFICANT CHANGE UP
MCHC RBC-ENTMCNC: 27.2 PG — SIGNIFICANT CHANGE UP (ref 27–34)
MCHC RBC-ENTMCNC: 32.3 G/DL — SIGNIFICANT CHANGE UP (ref 32–36)
MCV RBC AUTO: 84.4 FL — SIGNIFICANT CHANGE UP (ref 80–100)
MONOCYTES # BLD AUTO: 0.12 K/UL — SIGNIFICANT CHANGE UP (ref 0–0.9)
MONOCYTES NFR BLD AUTO: 3.5 % — SIGNIFICANT CHANGE UP (ref 2–14)
MYCOPLASMA AG SPEC QL: NEGATIVE — SIGNIFICANT CHANGE UP
NEUTROPHILS # BLD AUTO: 3.03 K/UL — SIGNIFICANT CHANGE UP (ref 1.8–7.4)
NEUTROPHILS NFR BLD AUTO: 87.3 % — HIGH (ref 43–77)
NRBC # BLD: 0 /100 WBCS — SIGNIFICANT CHANGE UP (ref 0–0)
NRBC BLD-RTO: 0 /100 WBCS — SIGNIFICANT CHANGE UP (ref 0–0)
PHOSPHATE SERPL-MCNC: 4.4 MG/DL — SIGNIFICANT CHANGE UP (ref 2.5–4.5)
PLAT MORPH BLD: NORMAL — SIGNIFICANT CHANGE UP
PLATELET # BLD AUTO: 118 K/UL — LOW (ref 150–400)
POTASSIUM SERPL-MCNC: 3.4 MMOL/L — LOW (ref 3.5–5.3)
POTASSIUM SERPL-SCNC: 3.4 MMOL/L — LOW (ref 3.5–5.3)
PROT SERPL-MCNC: 7.8 G/DL — SIGNIFICANT CHANGE UP (ref 6–8.3)
RBC # BLD: 5.25 M/UL — HIGH (ref 3.8–5.2)
RBC # FLD: 14.5 % — SIGNIFICANT CHANGE UP (ref 10.3–14.5)
RBC BLD AUTO: NORMAL — SIGNIFICANT CHANGE UP
SODIUM SERPL-SCNC: 139 MMOL/L — SIGNIFICANT CHANGE UP (ref 135–145)
TSH SERPL-MCNC: 1.58 UU/ML — SIGNIFICANT CHANGE UP (ref 0.34–4.82)
WBC # BLD: 3.47 K/UL — LOW (ref 3.8–10.5)
WBC # FLD AUTO: 3.47 K/UL — LOW (ref 3.8–10.5)

## 2025-01-24 PROCEDURE — 71045 X-RAY EXAM CHEST 1 VIEW: CPT | Mod: 26

## 2025-01-24 RX ORDER — METOPROLOL SUCCINATE 25 MG
25 TABLET, EXTENDED RELEASE 24 HR ORAL EVERY 8 HOURS
Refills: 0 | Status: DISCONTINUED | OUTPATIENT
Start: 2025-01-24 | End: 2025-01-31

## 2025-01-24 RX ORDER — POTASSIUM CHLORIDE 750 MG/1
20 TABLET, EXTENDED RELEASE ORAL
Refills: 0 | Status: COMPLETED | OUTPATIENT
Start: 2025-01-24 | End: 2025-01-25

## 2025-01-24 RX ADMIN — OXYBUTYNIN CHLORIDE 5 MILLIGRAM(S): 5 TABLET, EXTENDED RELEASE ORAL at 12:18

## 2025-01-24 RX ADMIN — APIXABAN 2.5 MILLIGRAM(S): 5 TABLET, FILM COATED ORAL at 17:55

## 2025-01-24 RX ADMIN — Medication 600 MILLIGRAM(S): at 05:56

## 2025-01-24 RX ADMIN — OSELTAMIVIR PHOSPHATE 30 MILLIGRAM(S): 75 CAPSULE ORAL at 17:55

## 2025-01-24 RX ADMIN — Medication 25 MILLIGRAM(S): at 15:29

## 2025-01-24 RX ADMIN — CEFTRIAXONE 100 MILLIGRAM(S): 250 INJECTION, POWDER, FOR SOLUTION INTRAMUSCULAR; INTRAVENOUS at 05:55

## 2025-01-24 RX ADMIN — Medication 40 MILLIGRAM(S): at 17:55

## 2025-01-24 RX ADMIN — APIXABAN 2.5 MILLIGRAM(S): 5 TABLET, FILM COATED ORAL at 05:56

## 2025-01-24 RX ADMIN — OSELTAMIVIR PHOSPHATE 30 MILLIGRAM(S): 75 CAPSULE ORAL at 05:56

## 2025-01-24 RX ADMIN — POTASSIUM CHLORIDE 20 MILLIEQUIVALENT(S): 750 TABLET, EXTENDED RELEASE ORAL at 17:55

## 2025-01-24 RX ADMIN — LACOSAMIDE 100 MILLIGRAM(S): 200 TABLET, FILM COATED ORAL at 06:01

## 2025-01-24 RX ADMIN — PANTOPRAZOLE 40 MILLIGRAM(S): 20 TABLET, DELAYED RELEASE ORAL at 05:58

## 2025-01-24 RX ADMIN — LACOSAMIDE 100 MILLIGRAM(S): 200 TABLET, FILM COATED ORAL at 17:55

## 2025-01-24 RX ADMIN — Medication 20 MILLIGRAM(S): at 15:29

## 2025-01-24 RX ADMIN — Medication 25 MILLIGRAM(S): at 05:56

## 2025-01-24 RX ADMIN — Medication 40 MILLIGRAM(S): at 05:57

## 2025-01-24 RX ADMIN — Medication 50 MILLIGRAM(S): at 12:18

## 2025-01-24 RX ADMIN — IPRATROPIUM BROMIDE AND ALBUTEROL SULFATE 3 MILLILITER(S): .5; 2.5 SOLUTION RESPIRATORY (INHALATION) at 20:05

## 2025-01-24 RX ADMIN — Medication 20 MILLIGRAM(S): at 05:57

## 2025-01-24 RX ADMIN — AZITHROMYCIN DIHYDRATE 255 MILLIGRAM(S): 500 TABLET, FILM COATED ORAL at 05:55

## 2025-01-24 RX ADMIN — IPRATROPIUM BROMIDE AND ALBUTEROL SULFATE 3 MILLILITER(S): .5; 2.5 SOLUTION RESPIRATORY (INHALATION) at 02:40

## 2025-01-24 RX ADMIN — Medication 25 MILLIGRAM(S): at 21:45

## 2025-01-24 RX ADMIN — Medication 600 MILLIGRAM(S): at 17:55

## 2025-01-24 NOTE — PROGRESS NOTE ADULT - SUBJECTIVE AND OBJECTIVE BOX
NP Note discussed with  Primary Attending    Patient is a 89y old  Female who presents with a chief complaint of AHRF 2/2 Flu vs PNA (24 Jan 2025 12:56)      INTERVAL HPI/OVERNIGHT EVENTS: no new complaints    MEDICATIONS  (STANDING):  albuterol/ipratropium for Nebulization 3 milliLiter(s) Nebulizer every 6 hours  apixaban 2.5 milliGRAM(s) Oral every 12 hours  azithromycin  IVPB 500 milliGRAM(s) IV Intermittent every 24 hours  cefTRIAXone   IVPB 1000 milliGRAM(s) IV Intermittent every 24 hours  furosemide   Injectable 20 milliGRAM(s) IV Push two times a day  guaiFENesin  milliGRAM(s) Oral every 12 hours  lacosamide 100 milliGRAM(s) Oral two times a day  methylPREDNISolone sodium succinate Injectable 40 milliGRAM(s) IV Push two times a day  metoprolol tartrate 25 milliGRAM(s) Oral every 8 hours  oseltamivir 30 milliGRAM(s) Oral two times a day  oseltamivir      oxybutynin XL 5 milliGRAM(s) Oral daily  pantoprazole    Tablet 40 milliGRAM(s) Oral before breakfast  potassium chloride   Powder 20 milliEquivalent(s) Oral two times a day  sertraline 50 milliGRAM(s) Oral daily    MEDICATIONS  (PRN):  acetaminophen     Tablet .. 650 milliGRAM(s) Oral every 6 hours PRN Temp greater or equal to 38C (100.4F), Mild Pain (1 - 3)  benzonatate 100 milliGRAM(s) Oral every 8 hours PRN Cough      __________________________________________________  REVIEW OF SYSTEMS:    CONSTITUTIONAL: No fever,   EYES: no acute visual disturbances  NECK: No pain or stiffness  RESPIRATORY: No cough; No shortness of breath  CARDIOVASCULAR: No chest pain, no palpitations  GASTROINTESTINAL: No pain. No nausea or vomiting; No diarrhea   NEUROLOGICAL: No headache or numbness, no tremors  MUSCULOSKELETAL: No joint pain, no muscle pain  GENITOURINARY: no dysuria, no frequency, no hesitancy  PSYCHIATRY: no depression , no anxiety  ALL OTHER  ROS negative        Vital Signs Last 24 Hrs  T(C): 36.8 (24 Jan 2025 11:30), Max: 36.9 (23 Jan 2025 16:18)  T(F): 98.2 (24 Jan 2025 11:30), Max: 98.4 (23 Jan 2025 16:18)  HR: 77 (24 Jan 2025 11:30) (77 - 109)  BP: 143/64 (24 Jan 2025 11:30) (113/58 - 179/99)  BP(mean): --  RR: 19 (24 Jan 2025 11:30) (19 - 24)  SpO2: 100% (24 Jan 2025 11:30) (92% - 100%)    Parameters below as of 24 Jan 2025 11:30  Patient On (Oxygen Delivery Method): room air        ________________________________________________  PHYSICAL EXAM:  GENERAL: NAD  HEENT: Normocephalic;  conjunctivae and sclerae clear; moist mucous membranes;   NECK : supple  CHEST/LUNG: Clear to auscultation bilaterally with good air entry   HEART: S1 S2  regular; no murmurs, gallops or rubs  ABDOMEN: Soft, Nontender, Nondistended; Bowel sounds present  EXTREMITIES: no cyanosis; no edema; no calf tenderness  SKIN: warm and dry; no rash  NERVOUS SYSTEM:  Awake and alert; Oriented  to place, person and time ; no new deficits    _________________________________________________  LABS:                        14.3   3.47  )-----------( 118      ( 24 Jan 2025 08:27 )             44.3     01-24    139  |  97  |  16  ----------------------------<  173[H]  3.4[L]   |  28  |  0.96    Ca    9.7      24 Jan 2025 08:27  Phos  4.4     01-24  Mg     2.0     01-24    TPro  7.8  /  Alb  3.7  /  TBili  0.7  /  DBili  x   /  AST  19  /  ALT  16  /  AlkPhos  129[H]  01-24      Urinalysis Basic - ( 24 Jan 2025 08:27 )    Color: x / Appearance: x / SG: x / pH: x  Gluc: 173 mg/dL / Ketone: x  / Bili: x / Urobili: x   Blood: x / Protein: x / Nitrite: x   Leuk Esterase: x / RBC: x / WBC x   Sq Epi: x / Non Sq Epi: x / Bacteria: x      CAPILLARY BLOOD GLUCOSE            RADIOLOGY & ADDITIONAL TESTS:    Imaging Personally Reviewed:  YES    Consultant(s) Notes Reviewed:   YES     Plan of care was discussed with patient and /or primary care giver; all questions and concerns were addressed and care was aligned with patient's wishes.     NP Note discussed with  Primary Attending    Patient is a 89y old  Female who presents with a chief complaint of AHRF 2/2 Flu vs PNA (24 Jan 2025 12:56)      INTERVAL HPI/OVERNIGHT EVENTS: no new complaints    MEDICATIONS  (STANDING):  albuterol/ipratropium for Nebulization 3 milliLiter(s) Nebulizer every 6 hours  apixaban 2.5 milliGRAM(s) Oral every 12 hours  azithromycin  IVPB 500 milliGRAM(s) IV Intermittent every 24 hours  cefTRIAXone   IVPB 1000 milliGRAM(s) IV Intermittent every 24 hours  furosemide   Injectable 20 milliGRAM(s) IV Push two times a day  guaiFENesin  milliGRAM(s) Oral every 12 hours  lacosamide 100 milliGRAM(s) Oral two times a day  methylPREDNISolone sodium succinate Injectable 40 milliGRAM(s) IV Push two times a day  metoprolol tartrate 25 milliGRAM(s) Oral every 8 hours  oseltamivir 30 milliGRAM(s) Oral two times a day  oseltamivir      oxybutynin XL 5 milliGRAM(s) Oral daily  pantoprazole    Tablet 40 milliGRAM(s) Oral before breakfast  potassium chloride   Powder 20 milliEquivalent(s) Oral two times a day  sertraline 50 milliGRAM(s) Oral daily    MEDICATIONS  (PRN):  acetaminophen     Tablet .. 650 milliGRAM(s) Oral every 6 hours PRN Temp greater or equal to 38C (100.4F), Mild Pain (1 - 3)  benzonatate 100 milliGRAM(s) Oral every 8 hours PRN Cough      __________________________________________________  REVIEW OF SYSTEMS:    CONSTITUTIONAL: No fever,   EYES: no acute visual disturbances  NECK: No pain or stiffness  RESPIRATORY: No cough; No shortness of breath  CARDIOVASCULAR: No chest pain, no palpitations  GASTROINTESTINAL: No pain. No nausea or vomiting; No diarrhea   NEUROLOGICAL: No headache or numbness, no tremors  MUSCULOSKELETAL: No joint pain, no muscle pain  GENITOURINARY: no dysuria, no frequency, no hesitancy  PSYCHIATRY: no depression , no anxiety  ALL OTHER  ROS negative        Vital Signs Last 24 Hrs  T(C): 36.8 (24 Jan 2025 11:30), Max: 36.9 (23 Jan 2025 16:18)  T(F): 98.2 (24 Jan 2025 11:30), Max: 98.4 (23 Jan 2025 16:18)  HR: 77 (24 Jan 2025 11:30) (77 - 109)  BP: 143/64 (24 Jan 2025 11:30) (113/58 - 179/99)  BP(mean): --  RR: 19 (24 Jan 2025 11:30) (19 - 24)  SpO2: 100% (24 Jan 2025 11:30) (92% - 100%)    Parameters below as of 24 Jan 2025 11:30  Patient On (Oxygen Delivery Method): room air        ________________________________________________  PHYSICAL EXAM:  GENERAL: NAD  HEENT: Normocephalic;  conjunctivae and sclerae clear; moist mucous membranes;   NECK : supple  CHEST/LUNG: Clear to auscultation bilaterally with good air entry   HEART: S1 S2  regular; no murmurs, gallops or rubs  ABDOMEN: Soft, Nontender, Nondistended; Bowel sounds present  EXTREMITIES: no cyanosis; no edema; no calf tenderness  SKIN: warm and dry; no rash  NERVOUS SYSTEM:  Awake and alert; Oriented  to place, person and time ; no new deficits    _________________________________________________  LABS:                        14.3   3.47  )-----------( 118      ( 24 Jan 2025 08:27 )             44.3     01-24    139  |  97  |  16  ----------------------------<  173[H]  3.4[L]   |  28  |  0.96    Ca    9.7      24 Jan 2025 08:27  Phos  4.4     01-24  Mg     2.0     01-24    TPro  7.8  /  Alb  3.7  /  TBili  0.7  /  DBili  x   /  AST  19  /  ALT  16  /  AlkPhos  129[H]  01-24      Urinalysis Basic - ( 24 Jan 2025 08:27 )    Color: x / Appearance: x / SG: x / pH: x  Gluc: 173 mg/dL / Ketone: x  / Bili: x / Urobili: x   Blood: x / Protein: x / Nitrite: x   Leuk Esterase: x / RBC: x / WBC x   Sq Epi: x / Non Sq Epi: x / Bacteria: x      CAPILLARY BLOOD GLUCOSE            RADIOLOGY & ADDITIONAL TESTS:  < from: CT Chest No Cont (01.22.25 @ 21:21) >    ACC: [id number] EXAM:  CT CHEST   ORDERED BY: STANFORD TOBAR     PROCEDURE DATE:  01/22/2025          INTERPRETATION:  CLINICAL INFORMATION: Coughing. Pleural effusion.    COMPARISON: None.    CONTRAST/COMPLICATIONS:  IV Contrast: NONE  Oral Contrast:NONE  .    PROCEDURE:  CT of the Chest was performed.  Sagittal and coronal reformats were performed.    FINDINGS:    LUNGS AND LARGE AIRWAYS: Patent central airways. Bilateral compressive   and linear atelectasis. There is a 2.2 cm right apical nodule comprised   of groundglass and solid density. The solid component measures 9 mm.   Question mild fibrotic changes peripherally.  PLEURA: There are small to moderate-sized bilateral pleural effusions.   Fluid is seen tracking along the right fissure.  VESSELS: Aortic calcifications. Coronary artery calcifications.  HEART: Heart size is enlarged. No pericardial effusion. Valvular   calcifications are noted.  MEDIASTINUM AND VERONIKA: No pathologically enlarged lymph nodes identified.   Increased number of nonenlarged mediastinal lymph nodes are appreciated..  CHEST WALL AND LOWER NECK: There is a 1.6 cm partially calcified right   thyroid nodule..  VISUALIZED UPPER ABDOMEN: Calcified granulomata of the liver appreciated.   There is a very small hiatal hernia. BONES: There is generalized   osteopenia. Degenerative changes of the spine appreciated. There is a   well marginated lucent lesion of the right scapula, which would better   evaluated with MRI. There is an exaggerated kyphosis.    IMPRESSION:  Cardiomegaly and bilateral small to moderate-sized pleural effusions.    Groundglass and solid density 2.2 cm nodule the right apex, with a solid   component measuring 0.9 cm. while this may be infectious in nature, a   single part solid nodule with the solid component measuring greater than   6 mm should be considered suspicious for malignancy. Short-term 3 month   follow-up versus PET CT or histopathologic correlation is advised.    Additional findings as above.    --- End of Report ---            DIRK NUNEZ M.D., Attending Radiologist  This document has been electronically signed. Jan 22 2025 10:00PM    < end of copied text >    Imaging Personally Reviewed:  YES    Consultant(s) Notes Reviewed:   YES     Plan of care was discussed with patient and /or primary care giver; all questions and concerns were addressed and care was aligned with patient's wishes.

## 2025-01-24 NOTE — PROGRESS NOTE ADULT - SUBJECTIVE AND OBJECTIVE BOX
Date of Service 01-24-25 @ 12:56    CHIEF COMPLAINT:Patient is a 89y old  Female who presents with a chief complaint of AHRF 2/2 Flu vs PNA .Pt appears comfortable.    	  REVIEW OF SYSTEMS:  CONSTITUTIONAL: No fever, weight loss, or fatigue  EYES: No eye pain, visual disturbances, or discharge  ENT:  No difficulty hearing, tinnitus, vertigo; No sinus or throat pain  NECK: No pain or stiffness  RESPIRATORY: No cough, wheezing, chills or hemoptysis; No Shortness of Breath  CARDIOVASCULAR: No chest pain, palpitations, passing out, dizziness, or leg swelling  GASTROINTESTINAL: No abdominal or epigastric pain. No nausea, vomiting, or hematemesis; No diarrhea or constipation. No melena or hematochezia.  GENITOURINARY: No dysuria, frequency, hematuria, or incontinence  NEUROLOGICAL: No headaches, memory loss, loss of strength, numbness, or tremors  SKIN: No itching, burning, rashes, or lesions   LYMPH Nodes: No enlarged glands  ENDOCRINE: No heat or cold intolerance; No hair loss  MUSCULOSKELETAL: No joint pain or swelling; No muscle, back, or extremity pain  PSYCHIATRIC: No depression, anxiety, mood swings, or difficulty sleeping  HEME/LYMPH: No easy bruising, or bleeding gums  ALLERGY AND IMMUNOLOGIC: No hives or eczema	      PHYSICAL EXAM:  T(C): 36.8 (01-24-25 @ 11:30), Max: 36.9 (01-23-25 @ 16:18)  HR: 77 (01-24-25 @ 11:30) (77 - 109)  BP: 143/64 (01-24-25 @ 11:30) (113/58 - 179/99)  RR: 19 (01-24-25 @ 11:30) (19 - 24)  SpO2: 100% (01-24-25 @ 11:30) (92% - 100%)  Wt(kg): --  I&O's Summary      Appearance: Normal	  HEENT:   Normal oral mucosa, PERRL, EOMI	  Lymphatic: No lymphadenopathy  Cardiovascular: Normal S1 S2, No JVD, No murmurs, No edema  Respiratory: Dec BS at bases  Psychiatry: A & O x 3, Mood & affect appropriate  Gastrointestinal:  Soft, Non-tender, + BS	  Skin: No rashes, No ecchymoses, No cyanosis	  Neurologic: Non-focal  Extremities: Normal range of motion, No clubbing, cyanosis or edema  Vascular: Peripheral pulses palpable 2+ bilaterally    MEDICATIONS  (STANDING):  albuterol/ipratropium for Nebulization 3 milliLiter(s) Nebulizer every 6 hours  apixaban 2.5 milliGRAM(s) Oral every 12 hours  azithromycin  IVPB 500 milliGRAM(s) IV Intermittent every 24 hours  cefTRIAXone   IVPB 1000 milliGRAM(s) IV Intermittent every 24 hours  furosemide   Injectable 20 milliGRAM(s) IV Push two times a day  guaiFENesin  milliGRAM(s) Oral every 12 hours  lacosamide 100 milliGRAM(s) Oral two times a day  methylPREDNISolone sodium succinate Injectable 40 milliGRAM(s) IV Push two times a day  metoprolol tartrate 25 milliGRAM(s) Oral two times a day  oseltamivir 30 milliGRAM(s) Oral two times a day  oseltamivir      oxybutynin XL 5 milliGRAM(s) Oral daily  pantoprazole    Tablet 40 milliGRAM(s) Oral before breakfast  potassium chloride   Powder 20 milliEquivalent(s) Oral two times a day  sertraline 50 milliGRAM(s) Oral daily      TELEMETRY: 	afib upto 90's    	  	  LABS:	 	                            14.3   3.47  )-----------( 118      ( 24 Jan 2025 08:27 )             44.3     01-24    139  |  97  |  16  ----------------------------<  173[H]  3.4[L]   |  28  |  0.96    Ca    9.7      24 Jan 2025 08:27  Phos  4.4     01-24  Mg     2.0     01-24    TPro  7.8  /  Alb  3.7  /  TBili  0.7  /  DBili  x   /  AST  19  /  ALT  16  /  AlkPhos  129[H]  01-24    TSH: Thyroid Stimulating Hormone, Serum: 1.58 uU/mL (01-24 @ 08:27)

## 2025-01-24 NOTE — PROGRESS NOTE ADULT - PROBLEM SELECTOR PLAN 2
Oxygen supp to keep SPO2 90% or above  Duoneb 1 unit dose neb Q6H PRN  Solumedrol IV  Taper FIO2 as Feasible  follow up ABGs  pulm toilet  Asp precautions  CXR follow up.

## 2025-01-24 NOTE — PROGRESS NOTE ADULT - PROBLEM SELECTOR PLAN 4
Cardiomegaly and B/L PE most likely 2/2 CHF  lasix trial  Echo  Cardio follow up. Cardiomegaly and B/L PE most likely 2/2 CHF  lasix trial  Echo done  Cardio follow up.

## 2025-01-24 NOTE — PROGRESS NOTE ADULT - PROBLEM SELECTOR PLAN 10
panculture  antibiotics  monitor temp and WBC  Follow up CXR. Check pending cultures  antibiotics  monitor temp and WBC  Follow up CXR.

## 2025-01-24 NOTE — PROGRESS NOTE ADULT - SUBJECTIVE AND OBJECTIVE BOX
pt seen in icu [  ], reg med floor [ x  ], bed [ x ], chair at bedside [   ]  She is awake, alert, laying in bed. She c/o cough.    REVIEW OF SYSTEMS:    CONSTITUTIONAL: No weakness, fevers or chills  EYES/ENT: No visual changes;  No vertigo or throat pain   NECK: No pain or stiffness  RESPIRATORY: No cough, wheezing, hemoptysis; No shortness of breath  CARDIOVASCULAR: No chest pain or palpitations  GASTROINTESTINAL: No abdominal or epigastric pain. No nausea, vomiting, or hematemesis; No diarrhea or constipation. No melena or hematochezia.  GENITOURINARY: No dysuria, frequency or hematuria  NEUROLOGICAL: No numbness or weakness  SKIN: No itching, burning, rashes, or lesions   All other review of systems is negative unless indicated above.    Physical Exam    General: WN/WD NAD  Neurology: A&Ox3, nonfocal, DANIELS x 4  Respiratory: Rhonchi B/L  CV: RRR, S1S2, no murmurs, rubs or gallops  Abdominal: Soft, NT, ND +BS, Last BM  Extremities: No edema, + peripheral pulses      Allergies  No Known Drug Allergies  Pineapple (Short breath)      Health Issues  Pneumonia due to infectious organism    HTN (hypertension)    Epilepsy    Atrial flutter    Urinary incontinence    Anxiety    GERD (gastroesophageal reflux disease)    No significant past surgical history        Vitals  T(F): 97.3 (01-24-25 @ 08:08), Max: 98.4 (01-23-25 @ 11:00)  HR: 82 (01-24-25 @ 08:08) (82 - 109)  BP: 166/87 (01-24-25 @ 08:08) (113/58 - 179/99)  RR: 19 (01-24-25 @ 08:08) (18 - 24)  SpO2: 97% (01-24-25 @ 08:08) (92% - 98%)  Wt(kg): --  CAPILLARY BLOOD GLUCOSE          Labs                          14.3   3.47  )-----------( x        ( 24 Jan 2025 08:27 )             44.3       01-24    139  |  97  |  16  ----------------------------<  173[H]  3.4[L]   |  28  |  0.96    Ca    9.7      24 Jan 2025 08:27  Phos  4.4     01-24  Mg     2.0     01-24    TPro  7.8  /  Alb  3.7  /  TBili  0.7  /  DBili  x   /  AST  19  /  ALT  16  /  AlkPhos  129[H]  01-24      Culture - Blood (01.22.25 @ 20:40)   Specimen Source: .Blood BLOOD  Culture Results:   No growth at 24 hours      Radiology Results      Meds    MEDICATIONS  (STANDING):  albuterol/ipratropium for Nebulization 3 milliLiter(s) Nebulizer every 6 hours  apixaban 2.5 milliGRAM(s) Oral every 12 hours  azithromycin  IVPB 500 milliGRAM(s) IV Intermittent every 24 hours  cefTRIAXone   IVPB 1000 milliGRAM(s) IV Intermittent every 24 hours  furosemide   Injectable 20 milliGRAM(s) IV Push two times a day  guaiFENesin  milliGRAM(s) Oral every 12 hours  lacosamide 100 milliGRAM(s) Oral two times a day  methylPREDNISolone sodium succinate Injectable 40 milliGRAM(s) IV Push two times a day  metoprolol tartrate 25 milliGRAM(s) Oral two times a day  oseltamivir 30 milliGRAM(s) Oral two times a day  oseltamivir      oxybutynin XL 5 milliGRAM(s) Oral daily  pantoprazole    Tablet 40 milliGRAM(s) Oral before breakfast  sertraline 50 milliGRAM(s) Oral daily      MEDICATIONS  (PRN):  acetaminophen     Tablet .. 650 milliGRAM(s) Oral every 6 hours PRN Temp greater or equal to 38C (100.4F), Mild Pain (1 - 3)  benzonatate 100 milliGRAM(s) Oral every 8 hours PRN Cough         pt seen in icu [  ], reg med floor [ x  ], bed [ x ], chair at bedside [   ]  She is awake, alert, laying in bed. She is having ECHO today    REVIEW OF SYSTEMS:    CONSTITUTIONAL: No weakness, fevers or chills  EYES/ENT: No visual changes;  No vertigo or throat pain   NECK: No pain or stiffness  RESPIRATORY: No cough, wheezing, hemoptysis; No shortness of breath  CARDIOVASCULAR: No chest pain or palpitations  GASTROINTESTINAL: No abdominal or epigastric pain. No nausea, vomiting, or hematemesis; No diarrhea or constipation. No melena or hematochezia.  GENITOURINARY: No dysuria, frequency or hematuria  NEUROLOGICAL: No numbness or weakness  SKIN: No itching, burning, rashes, or lesions   All other review of systems is negative unless indicated above.    Physical Exam    General: WN/WD NAD  Neurology: A&Ox3, nonfocal, DANIELS x 4  Respiratory: Rhonchi B/L  CV: RRR, S1S2, no murmurs, rubs or gallops  Abdominal: Soft, NT, ND +BS, Last BM  Extremities: No edema, + peripheral pulses      Allergies  No Known Drug Allergies  Pineapple (Short breath)      Health Issues  Pneumonia due to infectious organism    HTN (hypertension)    Epilepsy    Atrial flutter    Urinary incontinence    Anxiety    GERD (gastroesophageal reflux disease)    No significant past surgical history        Vitals  T(F): 97.3 (01-24-25 @ 08:08), Max: 98.4 (01-23-25 @ 11:00)  HR: 82 (01-24-25 @ 08:08) (82 - 109)  BP: 166/87 (01-24-25 @ 08:08) (113/58 - 179/99)  RR: 19 (01-24-25 @ 08:08) (18 - 24)  SpO2: 97% (01-24-25 @ 08:08) (92% - 98%)  Wt(kg): --  CAPILLARY BLOOD GLUCOSE          Labs                          14.3   3.47  )-----------( x        ( 24 Jan 2025 08:27 )             44.3       01-24    139  |  97  |  16  ----------------------------<  173[H]  3.4[L]   |  28  |  0.96    Ca    9.7      24 Jan 2025 08:27  Phos  4.4     01-24  Mg     2.0     01-24    TPro  7.8  /  Alb  3.7  /  TBili  0.7  /  DBili  x   /  AST  19  /  ALT  16  /  AlkPhos  129[H]  01-24      Culture - Blood (01.22.25 @ 20:40)   Specimen Source: .Blood BLOOD  Culture Results:   No growth at 24 hours      Radiology Results  < from: CT Chest No Cont (01.22.25 @ 21:21) >  IMPRESSION:  Cardiomegaly and bilateral small to moderate-sized pleural effusions.    Groundglass and solid density 2.2 cm nodule the right apex, with a solid   component measuring 0.9 cm. while this may be infectious in nature, a   single part solid nodule with the solid component measuring greater than   6 mm should be considered suspicious for malignancy. Short-term 3 month   follow-up versus PET CT or histopathologic correlation is advised.    Additional findings as above.      < end of copied text >      Meds    MEDICATIONS  (STANDING):  albuterol/ipratropium for Nebulization 3 milliLiter(s) Nebulizer every 6 hours  apixaban 2.5 milliGRAM(s) Oral every 12 hours  azithromycin  IVPB 500 milliGRAM(s) IV Intermittent every 24 hours  cefTRIAXone   IVPB 1000 milliGRAM(s) IV Intermittent every 24 hours  furosemide   Injectable 20 milliGRAM(s) IV Push two times a day  guaiFENesin  milliGRAM(s) Oral every 12 hours  lacosamide 100 milliGRAM(s) Oral two times a day  methylPREDNISolone sodium succinate Injectable 40 milliGRAM(s) IV Push two times a day  metoprolol tartrate 25 milliGRAM(s) Oral two times a day  oseltamivir 30 milliGRAM(s) Oral two times a day  oseltamivir      oxybutynin XL 5 milliGRAM(s) Oral daily  pantoprazole    Tablet 40 milliGRAM(s) Oral before breakfast  sertraline 50 milliGRAM(s) Oral daily      MEDICATIONS  (PRN):  acetaminophen     Tablet .. 650 milliGRAM(s) Oral every 6 hours PRN Temp greater or equal to 38C (100.4F), Mild Pain (1 - 3)  benzonatate 100 milliGRAM(s) Oral every 8 hours PRN Cough

## 2025-01-24 NOTE — GOALS OF CARE CONVERSATION - ADVANCED CARE PLANNING - CONVERSATION DETAILS
DNR/intubation     Goals of care conversation held verbally via telephone with HCP Daughter Waleska Peña. Discussed was pt's current condition, prognosis and treatment options including life sustaining treatment plan. Daughter does not want CPR nor PEG tube placement. She is okay with Intubation and dialysis. Each procedure was explained in detail and Daughter Verbalized understanding. Test results also discussed. All questions answered.

## 2025-01-25 DIAGNOSIS — I27.20 PULMONARY HYPERTENSION, UNSPECIFIED: ICD-10-CM

## 2025-01-25 LAB
ALBUMIN SERPL ELPH-MCNC: 3.1 G/DL — LOW (ref 3.5–5)
ALP SERPL-CCNC: 98 U/L — SIGNIFICANT CHANGE UP (ref 40–120)
ALT FLD-CCNC: 16 U/L DA — SIGNIFICANT CHANGE UP (ref 10–60)
ANION GAP SERPL CALC-SCNC: 13 MMOL/L — SIGNIFICANT CHANGE UP (ref 5–17)
AST SERPL-CCNC: 16 U/L — SIGNIFICANT CHANGE UP (ref 10–40)
BILIRUB SERPL-MCNC: 0.4 MG/DL — SIGNIFICANT CHANGE UP (ref 0.2–1.2)
BUN SERPL-MCNC: 24 MG/DL — HIGH (ref 7–18)
CALCIUM SERPL-MCNC: 8.8 MG/DL — SIGNIFICANT CHANGE UP (ref 8.4–10.5)
CHLORIDE SERPL-SCNC: 97 MMOL/L — SIGNIFICANT CHANGE UP (ref 96–108)
CHOLEST SERPL-MCNC: 105 MG/DL — SIGNIFICANT CHANGE UP
CO2 SERPL-SCNC: 26 MMOL/L — SIGNIFICANT CHANGE UP (ref 22–31)
CREAT SERPL-MCNC: 0.94 MG/DL — SIGNIFICANT CHANGE UP (ref 0.5–1.3)
EGFR: 56 ML/MIN/1.73M2 — LOW
GLUCOSE SERPL-MCNC: 209 MG/DL — HIGH (ref 70–99)
HCT VFR BLD CALC: 37.3 % — SIGNIFICANT CHANGE UP (ref 34.5–45)
HDLC SERPL-MCNC: 39 MG/DL — LOW
HGB BLD-MCNC: 12.1 G/DL — SIGNIFICANT CHANGE UP (ref 11.5–15.5)
LIPID PNL WITH DIRECT LDL SERPL: 52 MG/DL — SIGNIFICANT CHANGE UP
MCHC RBC-ENTMCNC: 27.3 PG — SIGNIFICANT CHANGE UP (ref 27–34)
MCHC RBC-ENTMCNC: 32.4 G/DL — SIGNIFICANT CHANGE UP (ref 32–36)
MCV RBC AUTO: 84.2 FL — SIGNIFICANT CHANGE UP (ref 80–100)
NON HDL CHOLESTEROL: 66 MG/DL — SIGNIFICANT CHANGE UP
NRBC # BLD: 0 /100 WBCS — SIGNIFICANT CHANGE UP (ref 0–0)
NRBC BLD-RTO: 0 /100 WBCS — SIGNIFICANT CHANGE UP (ref 0–0)
PLATELET # BLD AUTO: 178 K/UL — SIGNIFICANT CHANGE UP (ref 150–400)
POTASSIUM SERPL-MCNC: 3.7 MMOL/L — SIGNIFICANT CHANGE UP (ref 3.5–5.3)
POTASSIUM SERPL-SCNC: 3.7 MMOL/L — SIGNIFICANT CHANGE UP (ref 3.5–5.3)
PROT SERPL-MCNC: 6.8 G/DL — SIGNIFICANT CHANGE UP (ref 6–8.3)
RBC # BLD: 4.43 M/UL — SIGNIFICANT CHANGE UP (ref 3.8–5.2)
RBC # FLD: 14.5 % — SIGNIFICANT CHANGE UP (ref 10.3–14.5)
SODIUM SERPL-SCNC: 136 MMOL/L — SIGNIFICANT CHANGE UP (ref 135–145)
TRIGL SERPL-MCNC: 65 MG/DL — SIGNIFICANT CHANGE UP
TSH SERPL-MCNC: 1.69 UU/ML — SIGNIFICANT CHANGE UP (ref 0.34–4.82)
WBC # BLD: 6.99 K/UL — SIGNIFICANT CHANGE UP (ref 3.8–10.5)
WBC # FLD AUTO: 6.99 K/UL — SIGNIFICANT CHANGE UP (ref 3.8–10.5)

## 2025-01-25 RX ADMIN — OXYBUTYNIN CHLORIDE 5 MILLIGRAM(S): 5 TABLET, EXTENDED RELEASE ORAL at 11:51

## 2025-01-25 RX ADMIN — Medication 25 MILLIGRAM(S): at 22:55

## 2025-01-25 RX ADMIN — POTASSIUM CHLORIDE 20 MILLIEQUIVALENT(S): 750 TABLET, EXTENDED RELEASE ORAL at 05:32

## 2025-01-25 RX ADMIN — Medication 125 MICROGRAM(S): at 12:01

## 2025-01-25 RX ADMIN — Medication 600 MILLIGRAM(S): at 17:59

## 2025-01-25 RX ADMIN — APIXABAN 2.5 MILLIGRAM(S): 5 TABLET, FILM COATED ORAL at 05:32

## 2025-01-25 RX ADMIN — ACETAMINOPHEN 650 MILLIGRAM(S): 160 SUSPENSION ORAL at 08:34

## 2025-01-25 RX ADMIN — Medication 50 MILLIGRAM(S): at 11:51

## 2025-01-25 RX ADMIN — Medication 600 MILLIGRAM(S): at 05:32

## 2025-01-25 RX ADMIN — OSELTAMIVIR PHOSPHATE 30 MILLIGRAM(S): 75 CAPSULE ORAL at 17:59

## 2025-01-25 RX ADMIN — Medication 20 MILLIGRAM(S): at 15:58

## 2025-01-25 RX ADMIN — IPRATROPIUM BROMIDE AND ALBUTEROL SULFATE 3 MILLILITER(S): .5; 2.5 SOLUTION RESPIRATORY (INHALATION) at 20:21

## 2025-01-25 RX ADMIN — IPRATROPIUM BROMIDE AND ALBUTEROL SULFATE 3 MILLILITER(S): .5; 2.5 SOLUTION RESPIRATORY (INHALATION) at 02:45

## 2025-01-25 RX ADMIN — APIXABAN 2.5 MILLIGRAM(S): 5 TABLET, FILM COATED ORAL at 17:59

## 2025-01-25 RX ADMIN — AZITHROMYCIN DIHYDRATE 255 MILLIGRAM(S): 500 TABLET, FILM COATED ORAL at 05:29

## 2025-01-25 RX ADMIN — OSELTAMIVIR PHOSPHATE 30 MILLIGRAM(S): 75 CAPSULE ORAL at 05:32

## 2025-01-25 RX ADMIN — Medication 25 MILLIGRAM(S): at 05:32

## 2025-01-25 RX ADMIN — ACETAMINOPHEN 650 MILLIGRAM(S): 160 SUSPENSION ORAL at 09:30

## 2025-01-25 RX ADMIN — IPRATROPIUM BROMIDE AND ALBUTEROL SULFATE 3 MILLILITER(S): .5; 2.5 SOLUTION RESPIRATORY (INHALATION) at 15:58

## 2025-01-25 RX ADMIN — PANTOPRAZOLE 40 MILLIGRAM(S): 20 TABLET, DELAYED RELEASE ORAL at 05:33

## 2025-01-25 RX ADMIN — LACOSAMIDE 100 MILLIGRAM(S): 200 TABLET, FILM COATED ORAL at 17:59

## 2025-01-25 RX ADMIN — LACOSAMIDE 100 MILLIGRAM(S): 200 TABLET, FILM COATED ORAL at 05:59

## 2025-01-25 RX ADMIN — Medication 20 MILLIGRAM(S): at 05:31

## 2025-01-25 RX ADMIN — CEFTRIAXONE 100 MILLIGRAM(S): 250 INJECTION, POWDER, FOR SOLUTION INTRAMUSCULAR; INTRAVENOUS at 05:29

## 2025-01-25 RX ADMIN — Medication 40 MILLIGRAM(S): at 05:31

## 2025-01-25 NOTE — PHYSICAL THERAPY INITIAL EVALUATION ADULT - ADDITIONAL COMMENTS
Pt is poor historian, per chart review, pt lives with daughter in an elevator accessed apartment building, ambulates with RW and requires assists with ADLs

## 2025-01-25 NOTE — PROGRESS NOTE ADULT - SUBJECTIVE AND OBJECTIVE BOX
pt seen in icu [  ], reg med floor [ x  ], bed [ x ], chair at bedside [   ]  She is awake, alert, laying in bed. ECHO performed yesterday.    REVIEW OF SYSTEMS:    CONSTITUTIONAL: No weakness, fevers or chills  EYES/ENT: No visual changes;  No vertigo or throat pain   NECK: No pain or stiffness  RESPIRATORY: No cough, wheezing, hemoptysis; No shortness of breath  CARDIOVASCULAR: No chest pain or palpitations  GASTROINTESTINAL: No abdominal or epigastric pain. No nausea, vomiting, or hematemesis; No diarrhea or constipation. No melena or hematochezia.  GENITOURINARY: No dysuria, frequency or hematuria  NEUROLOGICAL: No numbness or weakness  SKIN: No itching, burning, rashes, or lesions   All other review of systems is negative unless indicated above.    Physical Exam    General: WN/WD NAD  Neurology: A&Ox3, nonfocal, DANIELS x 4  Respiratory: Rhonchi B/L  CV: RRR, S1S2, no murmurs, rubs or gallops  Abdominal: Soft, NT, ND +BS, Last BM  Extremities: No edema, + peripheral pulses      Allergies  No Known Drug Allergies  Pineapple (Short breath)  Kiwi (Swelling)      Health Issues  Pneumonia due to infectious organism    Intubate    HTN (hypertension)    Epilepsy    Atrial flutter    Urinary incontinence    Anxiety    GERD (gastroesophageal reflux disease)    No significant past surgical history        Vitals  T(F): 97.9 (01-25-25 @ 08:04), Max: 98.2 (01-24-25 @ 11:30)  HR: 104 (01-25-25 @ 08:04) (77 - 112)  BP: 126/80 (01-25-25 @ 08:04) (109/61 - 148/77)  RR: 18 (01-25-25 @ 08:04) (18 - 19)  SpO2: 96% (01-25-25 @ 08:04) (95% - 100%)  Wt(kg): --  CAPILLARY BLOOD GLUCOSE          Labs                          12.1   6.99  )-----------( 178      ( 25 Jan 2025 07:29 )             37.3       01-25    136  |  97  |  24[H]  ----------------------------<  209[H]  3.7   |  26  |  0.94    Ca    8.8      25 Jan 2025 07:29  Phos  4.4     01-24  Mg     2.0     01-24    TPro  6.8  /  Alb  3.1[L]  /  TBili  0.4  /  DBili  x   /  AST  16  /  ALT  16  /  AlkPhos  98  01-25      Culture - Blood (01.22.25 @ 20:40)   Specimen Source: .Blood BLOOD  Culture Results:   No growth at 48 Hours  Radiology Results  < from: TTE W or WO Ultrasound Enhancing Agent (01.24.25 @ 11:25) >  CONCLUSIONS:      1. Left ventricular cavity is small. Left ventricular wall thickness is normal. Left ventricular systolic function is normal. There are no regional wall motion abnormalities seen.   2. There is mild (grade 1) left ventricular diastolic dysfunction.   3. Normal right ventricular cavity size, with normal wall thickness, and reduced right ventricular systolic function. Tricuspid annular plane systolic excursion (TAPSE) is 1.6 cm (normal >=1.7 cm).   4. The right atrium is dilated.   5. Left atrium is moderately dilated.   6. Moderate mitral regurgitation.   7. Moderate tricuspid regurgitation.   8. Estimated pulmonary artery systolic pressure is 45 mmHg, consistent with mild pulmonary hypertension.   9. Left pleural effusion noted.  10. Mild aortic regurgitation.  11. Mild pulmonic regurgitation.  12. Moderate aortic stenosis.  13. No pericardial effusion seen.  14. No prior echocardiogram is available forcomparison.      < end of copied text >      Meds    MEDICATIONS  (STANDING):  albuterol/ipratropium for Nebulization 3 milliLiter(s) Nebulizer every 6 hours  apixaban 2.5 milliGRAM(s) Oral every 12 hours  azithromycin  IVPB 500 milliGRAM(s) IV Intermittent every 24 hours  cefTRIAXone   IVPB 1000 milliGRAM(s) IV Intermittent every 24 hours  furosemide   Injectable 20 milliGRAM(s) IV Push two times a day  guaiFENesin  milliGRAM(s) Oral every 12 hours  lacosamide 100 milliGRAM(s) Oral two times a day  metoprolol tartrate 25 milliGRAM(s) Oral every 8 hours  oseltamivir 30 milliGRAM(s) Oral two times a day  oseltamivir      oxybutynin XL 5 milliGRAM(s) Oral daily  pantoprazole    Tablet 40 milliGRAM(s) Oral before breakfast  sertraline 50 milliGRAM(s) Oral daily      MEDICATIONS  (PRN):  acetaminophen     Tablet .. 650 milliGRAM(s) Oral every 6 hours PRN Temp greater or equal to 38C (100.4F), Mild Pain (1 - 3)  benzonatate 100 milliGRAM(s) Oral every 8 hours PRN Cough         pt seen in icu [  ], reg med floor [ x  ], bed [ x ], chair at bedside [   ]  She is awake, alert, laying in bed. ECHO performed yesterday. Clinically improved    REVIEW OF SYSTEMS:    CONSTITUTIONAL: No weakness, fevers or chills  EYES/ENT: No visual changes;  No vertigo or throat pain   NECK: No pain or stiffness  RESPIRATORY: No cough, wheezing, hemoptysis; No shortness of breath  CARDIOVASCULAR: No chest pain or palpitations  GASTROINTESTINAL: No abdominal or epigastric pain. No nausea, vomiting, or hematemesis; No diarrhea or constipation. No melena or hematochezia.  GENITOURINARY: No dysuria, frequency or hematuria  NEUROLOGICAL: No numbness or weakness  SKIN: No itching, burning, rashes, or lesions   All other review of systems is negative unless indicated above.    Physical Exam    General: WN/WD NAD  Neurology: A&Ox3, nonfocal, DANIELS x 4  Respiratory: Rhonchi B/L  CV: RRR, S1S2, no murmurs, rubs or gallops  Abdominal: Soft, NT, ND +BS, Last BM  Extremities: No edema, + peripheral pulses      Allergies  No Known Drug Allergies  Pineapple (Short breath)  Kiwi (Swelling)      Health Issues  Pneumonia due to infectious organism    Intubate    HTN (hypertension)    Epilepsy    Atrial flutter    Urinary incontinence    Anxiety    GERD (gastroesophageal reflux disease)    No significant past surgical history        Vitals  T(F): 97.9 (01-25-25 @ 08:04), Max: 98.2 (01-24-25 @ 11:30)  HR: 104 (01-25-25 @ 08:04) (77 - 112)  BP: 126/80 (01-25-25 @ 08:04) (109/61 - 148/77)  RR: 18 (01-25-25 @ 08:04) (18 - 19)  SpO2: 96% (01-25-25 @ 08:04) (95% - 100%)  Wt(kg): --  CAPILLARY BLOOD GLUCOSE          Labs                          12.1   6.99  )-----------( 178      ( 25 Jan 2025 07:29 )             37.3       01-25    136  |  97  |  24[H]  ----------------------------<  209[H]  3.7   |  26  |  0.94    Ca    8.8      25 Jan 2025 07:29  Phos  4.4     01-24  Mg     2.0     01-24    TPro  6.8  /  Alb  3.1[L]  /  TBili  0.4  /  DBili  x   /  AST  16  /  ALT  16  /  AlkPhos  98  01-25      Culture - Blood (01.22.25 @ 20:40)   Specimen Source: .Blood BLOOD  Culture Results:   No growth at 48 Hours  Radiology Results  < from: TTE W or WO Ultrasound Enhancing Agent (01.24.25 @ 11:25) >  CONCLUSIONS:      1. Left ventricular cavity is small. Left ventricular wall thickness is normal. Left ventricular systolic function is normal. There are no regional wall motion abnormalities seen.   2. There is mild (grade 1) left ventricular diastolic dysfunction.   3. Normal right ventricular cavity size, with normal wall thickness, and reduced right ventricular systolic function. Tricuspid annular plane systolic excursion (TAPSE) is 1.6 cm (normal >=1.7 cm).   4. The right atrium is dilated.   5. Left atrium is moderately dilated.   6. Moderate mitral regurgitation.   7. Moderate tricuspid regurgitation.   8. Estimated pulmonary artery systolic pressure is 45 mmHg, consistent with mild pulmonary hypertension.   9. Left pleural effusion noted.  10. Mild aortic regurgitation.  11. Mild pulmonic regurgitation.  12. Moderate aortic stenosis.  13. No pericardial effusion seen.  14. No prior echocardiogram is available for comparison.      < end of copied text >      Meds    MEDICATIONS  (STANDING):  albuterol/ipratropium for Nebulization 3 milliLiter(s) Nebulizer every 6 hours  apixaban 2.5 milliGRAM(s) Oral every 12 hours  azithromycin  IVPB 500 milliGRAM(s) IV Intermittent every 24 hours  cefTRIAXone   IVPB 1000 milliGRAM(s) IV Intermittent every 24 hours  furosemide   Injectable 20 milliGRAM(s) IV Push two times a day  guaiFENesin  milliGRAM(s) Oral every 12 hours  lacosamide 100 milliGRAM(s) Oral two times a day  metoprolol tartrate 25 milliGRAM(s) Oral every 8 hours  oseltamivir 30 milliGRAM(s) Oral two times a day  oseltamivir      oxybutynin XL 5 milliGRAM(s) Oral daily  pantoprazole    Tablet 40 milliGRAM(s) Oral before breakfast  sertraline 50 milliGRAM(s) Oral daily      MEDICATIONS  (PRN):  acetaminophen     Tablet .. 650 milliGRAM(s) Oral every 6 hours PRN Temp greater or equal to 38C (100.4F), Mild Pain (1 - 3)  benzonatate 100 milliGRAM(s) Oral every 8 hours PRN Cough

## 2025-01-25 NOTE — PROGRESS NOTE ADULT - SUBJECTIVE AND OBJECTIVE BOX
Date of Service 01-25-25 @ 10:49    CHIEF COMPLAINT:Patient is a 89y old  Female who presents with a chief complaint of AHRF 2/2 Flu vs PNA (25 Jan 2025 08:56)    	  REVIEW OF SYSTEMS:  CONSTITUTIONAL: No fever, weight loss, or fatigue  EYES: No eye pain, visual disturbances, or discharge  ENT:  No difficulty hearing, tinnitus, vertigo; No sinus or throat pain  NECK: No pain or stiffness  RESPIRATORY: No cough, wheezing, chills or hemoptysis; No Shortness of Breath  CARDIOVASCULAR: No chest pain, palpitations, passing out, dizziness, or leg swelling  GASTROINTESTINAL: No abdominal or epigastric pain. No nausea, vomiting, or hematemesis; No diarrhea or constipation. No melena or hematochezia.  GENITOURINARY: No dysuria, frequency, hematuria, or incontinence  NEUROLOGICAL: No headaches, memory loss, loss of strength, numbness, or tremors  SKIN: No itching, burning, rashes, or lesions   LYMPH Nodes: No enlarged glands  ENDOCRINE: No heat or cold intolerance; No hair loss  MUSCULOSKELETAL: No joint pain or swelling; No muscle, back, or extremity pain  PSYCHIATRIC: No depression, anxiety, mood swings, or difficulty sleeping  HEME/LYMPH: No easy bruising, or bleeding gums  ALLERGY AND IMMUNOLOGIC: No hives or eczema	        PHYSICAL EXAM:  T(C): 36.6 (01-25-25 @ 08:04), Max: 36.8 (01-24-25 @ 11:30)  HR: 104 (01-25-25 @ 08:04) (77 - 112)  BP: 126/80 (01-25-25 @ 08:04) (109/61 - 148/77)  RR: 18 (01-25-25 @ 08:04) (18 - 19)  SpO2: 96% (01-25-25 @ 08:04) (95% - 100%)  Wt(kg): --  I&O's Summary    24 Jan 2025 07:01  -  25 Jan 2025 07:00  --------------------------------------------------------  IN: 600 mL / OUT: 500 mL / NET: 100 mL        Appearance: Normal	  HEENT:   Normal oral mucosa, PERRL, EOMI	  Lymphatic: No lymphadenopathy  Cardiovascular: Normal S1 S2, No JVD, No murmurs, No edema  Respiratory:dec BS at bases	  Psychiatry: A & O x 3, Mood & affect appropriate  Gastrointestinal:  Soft, Non-tender, + BS	  Skin: No rashes, No ecchymoses, No cyanosis	  Neurologic: Non-focal  Extremities: Normal range of motion, No clubbing, cyanosis or edema  Vascular: Peripheral pulses palpable 2+ bilaterally    MEDICATIONS  (STANDING):  albuterol/ipratropium for Nebulization 3 milliLiter(s) Nebulizer every 6 hours  apixaban 2.5 milliGRAM(s) Oral every 12 hours  azithromycin  IVPB 500 milliGRAM(s) IV Intermittent every 24 hours  cefTRIAXone   IVPB 1000 milliGRAM(s) IV Intermittent every 24 hours  furosemide   Injectable 20 milliGRAM(s) IV Push two times a day  guaiFENesin  milliGRAM(s) Oral every 12 hours  lacosamide 100 milliGRAM(s) Oral two times a day  metoprolol tartrate 25 milliGRAM(s) Oral every 8 hours  oseltamivir 30 milliGRAM(s) Oral two times a day  oseltamivir      oxybutynin XL 5 milliGRAM(s) Oral daily  pantoprazole    Tablet 40 milliGRAM(s) Oral before breakfast  sertraline 50 milliGRAM(s) Oral daily      TELEMETRY: afib upto 120's	     	  	  LABS:	 	                       12.1   6.99  )-----------( 178      ( 25 Jan 2025 07:29 )             37.3     01-25    136  |  97  |  24[H]  ----------------------------<  209[H]  3.7   |  26  |  0.94    Ca    8.8      25 Jan 2025 07:29  Phos  4.4     01-24  Mg     2.0     01-24    TPro  6.8  /  Alb  3.1[L]  /  TBili  0.4  /  DBili  x   /  AST  16  /  ALT  16  /  AlkPhos  98  01-25      Lipid Profile: Cholesterol 105  HDL 39  TG 65  Ldl calc 52     TSH: Thyroid Stimulating Hormone, Serum: 1.69 uU/mL (01-25 @ 07:29)  Thyroid Stimulating Hormone, Serum: 1.58 uU/mL (01-24 @ 08:27)      < from: TTE W or WO Ultrasound Enhancing Agent (01.24.25 @ 11:25) >  CONCLUSIONS:      1. Left ventricular cavity is small. Left ventricular wall thickness is normal. Left ventricular systolic function is normal. There are no regional wall motion abnormalities seen.   2. There is mild (grade 1) left ventricular diastolic dysfunction.   3. Normal right ventricular cavity size, with normal wall thickness, and reduced right ventricular systolic function. Tricuspid annular plane systolic excursion (TAPSE) is 1.6 cm (normal >=1.7 cm).   4. The right atrium is dilated.   5. Left atrium is moderately dilated.   6. Moderate mitral regurgitation.   7. Moderate tricuspid regurgitation.   8. Estimated pulmonary artery systolic pressure is 45 mmHg, consistent with mild pulmonary hypertension.   9. Left pleural effusion noted.  10. Mild aortic regurgitation.  11. Mild pulmonic regurgitation.  12. Moderate aortic stenosis.  13. No pericardial effusion seen.  14. No prior echocardiogram is available forcomparison.    < end of copied text >

## 2025-01-25 NOTE — PHYSICAL THERAPY INITIAL EVALUATION ADULT - MUSCLE TONE ASSESSMENT, REHAB EVAL
Detail Level: Detailed
General Sunscreen Counseling: I recommend a broad spectrum sunscreen with a SPF of 30 or higher, and should be reapplied every 2-3 hours after that as long as sun exposure continues. I also recommend a lip balm with a sunscreen as well. Sun protective clothing and hats can be used but must be worn the entire time you are exposed to the sun's rays.
bilateral upper extremities/bilateral lower extremities/mildly increased tone

## 2025-01-25 NOTE — PHYSICAL THERAPY INITIAL EVALUATION ADULT - CRITERIA FOR SKILLED THERAPEUTIC INTERVENTIONS
EDUARDA/impairments found/functional limitations in following categories/anticipated discharge recommendation

## 2025-01-25 NOTE — PHYSICAL THERAPY INITIAL EVALUATION ADULT - LEVEL OF INDEPENDENCE: SIT/STAND, REHAB EVAL
pt deferred to participate despite encouragement and education on importance of mobilization activities-was retropulsive while sitting @ [facility];/unable to perform

## 2025-01-25 NOTE — PROGRESS NOTE ADULT - PROBLEM SELECTOR PLAN 2
Oxygen supp to keep SPO2 90% or above  Duoneb 1 unit dose neb Q6H PRN  Solumedrol IV  Taper FIO2 as Feasible  follow up ABGs  pulm toilet  Asp precautions  CXR follow up. Oxygen supp to keep SPO2 90% or above  Duoneb 1 unit dose neb Q6H PRN  Solumedrol IV  follow up ABGs  pulm toilet  Asp precautions  CXR follow up.

## 2025-01-25 NOTE — PHYSICAL THERAPY INITIAL EVALUATION ADULT - GENERAL OBSERVATIONS, REHAB EVAL
Pt seen supine in bed w/telemetry, confused, [place], c/o severe headache-was given medication prior to PT. Pt with difficulty responding to questions and following directions despite use of  (#[id number])-due to [place]

## 2025-01-26 LAB
ALBUMIN SERPL ELPH-MCNC: 3.2 G/DL — LOW (ref 3.5–5)
ALP SERPL-CCNC: 101 U/L — SIGNIFICANT CHANGE UP (ref 40–120)
ALT FLD-CCNC: 18 U/L DA — SIGNIFICANT CHANGE UP (ref 10–60)
ANION GAP SERPL CALC-SCNC: 9 MMOL/L — SIGNIFICANT CHANGE UP (ref 5–17)
AST SERPL-CCNC: 20 U/L — SIGNIFICANT CHANGE UP (ref 10–40)
BILIRUB SERPL-MCNC: 0.5 MG/DL — SIGNIFICANT CHANGE UP (ref 0.2–1.2)
BUN SERPL-MCNC: 23 MG/DL — HIGH (ref 7–18)
CALCIUM SERPL-MCNC: 8.9 MG/DL — SIGNIFICANT CHANGE UP (ref 8.4–10.5)
CHLORIDE SERPL-SCNC: 99 MMOL/L — SIGNIFICANT CHANGE UP (ref 96–108)
CO2 SERPL-SCNC: 31 MMOL/L — SIGNIFICANT CHANGE UP (ref 22–31)
CREAT SERPL-MCNC: 0.79 MG/DL — SIGNIFICANT CHANGE UP (ref 0.5–1.3)
EGFR: 68 ML/MIN/1.73M2 — SIGNIFICANT CHANGE UP
GLUCOSE SERPL-MCNC: 129 MG/DL — HIGH (ref 70–99)
HCT VFR BLD CALC: 38.4 % — SIGNIFICANT CHANGE UP (ref 34.5–45)
HGB BLD-MCNC: 12.5 G/DL — SIGNIFICANT CHANGE UP (ref 11.5–15.5)
MCHC RBC-ENTMCNC: 27.2 PG — SIGNIFICANT CHANGE UP (ref 27–34)
MCHC RBC-ENTMCNC: 32.6 G/DL — SIGNIFICANT CHANGE UP (ref 32–36)
MCV RBC AUTO: 83.5 FL — SIGNIFICANT CHANGE UP (ref 80–100)
NRBC # BLD: 0 /100 WBCS — SIGNIFICANT CHANGE UP (ref 0–0)
NRBC BLD-RTO: 0 /100 WBCS — SIGNIFICANT CHANGE UP (ref 0–0)
PLATELET # BLD AUTO: 194 K/UL — SIGNIFICANT CHANGE UP (ref 150–400)
POTASSIUM SERPL-MCNC: 3 MMOL/L — LOW (ref 3.5–5.3)
POTASSIUM SERPL-SCNC: 3 MMOL/L — LOW (ref 3.5–5.3)
PROT SERPL-MCNC: 6.5 G/DL — SIGNIFICANT CHANGE UP (ref 6–8.3)
RBC # BLD: 4.6 M/UL — SIGNIFICANT CHANGE UP (ref 3.8–5.2)
RBC # FLD: 14.7 % — HIGH (ref 10.3–14.5)
SODIUM SERPL-SCNC: 139 MMOL/L — SIGNIFICANT CHANGE UP (ref 135–145)
WBC # BLD: 6.46 K/UL — SIGNIFICANT CHANGE UP (ref 3.8–10.5)
WBC # FLD AUTO: 6.46 K/UL — SIGNIFICANT CHANGE UP (ref 3.8–10.5)

## 2025-01-26 RX ORDER — POTASSIUM CHLORIDE 750 MG/1
20 TABLET, EXTENDED RELEASE ORAL
Refills: 0 | Status: COMPLETED | OUTPATIENT
Start: 2025-01-26 | End: 2025-01-27

## 2025-01-26 RX ADMIN — ACETAMINOPHEN 650 MILLIGRAM(S): 160 SUSPENSION ORAL at 17:32

## 2025-01-26 RX ADMIN — Medication 25 MILLIGRAM(S): at 05:44

## 2025-01-26 RX ADMIN — Medication 25 MILLIGRAM(S): at 16:24

## 2025-01-26 RX ADMIN — Medication 50 MILLIGRAM(S): at 11:42

## 2025-01-26 RX ADMIN — OXYBUTYNIN CHLORIDE 5 MILLIGRAM(S): 5 TABLET, EXTENDED RELEASE ORAL at 11:42

## 2025-01-26 RX ADMIN — CEFTRIAXONE 100 MILLIGRAM(S): 250 INJECTION, POWDER, FOR SOLUTION INTRAMUSCULAR; INTRAVENOUS at 05:15

## 2025-01-26 RX ADMIN — IPRATROPIUM BROMIDE AND ALBUTEROL SULFATE 3 MILLILITER(S): .5; 2.5 SOLUTION RESPIRATORY (INHALATION) at 14:23

## 2025-01-26 RX ADMIN — APIXABAN 2.5 MILLIGRAM(S): 5 TABLET, FILM COATED ORAL at 17:31

## 2025-01-26 RX ADMIN — APIXABAN 2.5 MILLIGRAM(S): 5 TABLET, FILM COATED ORAL at 05:15

## 2025-01-26 RX ADMIN — Medication 20 MILLIGRAM(S): at 16:23

## 2025-01-26 RX ADMIN — IPRATROPIUM BROMIDE AND ALBUTEROL SULFATE 3 MILLILITER(S): .5; 2.5 SOLUTION RESPIRATORY (INHALATION) at 08:21

## 2025-01-26 RX ADMIN — Medication 600 MILLIGRAM(S): at 05:15

## 2025-01-26 RX ADMIN — LACOSAMIDE 100 MILLIGRAM(S): 200 TABLET, FILM COATED ORAL at 17:32

## 2025-01-26 RX ADMIN — Medication 600 MILLIGRAM(S): at 17:32

## 2025-01-26 RX ADMIN — ACETAMINOPHEN 650 MILLIGRAM(S): 160 SUSPENSION ORAL at 18:00

## 2025-01-26 RX ADMIN — OSELTAMIVIR PHOSPHATE 30 MILLIGRAM(S): 75 CAPSULE ORAL at 05:16

## 2025-01-26 RX ADMIN — PANTOPRAZOLE 40 MILLIGRAM(S): 20 TABLET, DELAYED RELEASE ORAL at 05:44

## 2025-01-26 RX ADMIN — IPRATROPIUM BROMIDE AND ALBUTEROL SULFATE 3 MILLILITER(S): .5; 2.5 SOLUTION RESPIRATORY (INHALATION) at 20:07

## 2025-01-26 RX ADMIN — LACOSAMIDE 100 MILLIGRAM(S): 200 TABLET, FILM COATED ORAL at 05:15

## 2025-01-26 RX ADMIN — AZITHROMYCIN DIHYDRATE 255 MILLIGRAM(S): 500 TABLET, FILM COATED ORAL at 05:15

## 2025-01-26 RX ADMIN — Medication 25 MILLIGRAM(S): at 21:10

## 2025-01-26 RX ADMIN — OSELTAMIVIR PHOSPHATE 30 MILLIGRAM(S): 75 CAPSULE ORAL at 17:32

## 2025-01-26 RX ADMIN — Medication 125 MICROGRAM(S): at 05:15

## 2025-01-26 RX ADMIN — Medication 20 MILLIGRAM(S): at 05:15

## 2025-01-26 RX ADMIN — POTASSIUM CHLORIDE 20 MILLIEQUIVALENT(S): 750 TABLET, EXTENDED RELEASE ORAL at 17:33

## 2025-01-26 NOTE — PROGRESS NOTE ADULT - SUBJECTIVE AND OBJECTIVE BOX
pt seen in icu [  ], reg med floor [ x  ], bed [ x ], chair at bedside [   ]  She is awake, alert, laying in bed. ECHO performed yesterday. Clinically improved    REVIEW OF SYSTEMS:    CONSTITUTIONAL: No weakness, fevers or chills  EYES/ENT: No visual changes;  No vertigo or throat pain   NECK: No pain or stiffness  RESPIRATORY: No cough, wheezing, hemoptysis; No shortness of breath  CARDIOVASCULAR: No chest pain or palpitations  GASTROINTESTINAL: No abdominal or epigastric pain. No nausea, vomiting, or hematemesis; No diarrhea or constipation. No melena or hematochezia.  GENITOURINARY: No dysuria, frequency or hematuria  NEUROLOGICAL: No numbness or weakness  SKIN: No itching, burning, rashes, or lesions   All other review of systems is negative unless indicated above.    Physical Exam    General: WN/WD NAD  Neurology: A&Ox3, nonfocal, DANIELS x 4  Respiratory: Rhonchi B/L  CV: RRR, S1S2, no murmurs, rubs or gallops  Abdominal: Soft, NT, ND +BS, Last BM  Extremities: No edema, + peripheral pulses      Allergies  No Known Drug Allergies  Pineapple (Short breath)  Kiwi (Swelling)      Health Issues  Pneumonia due to infectious organism    Intubate    HTN (hypertension)    Epilepsy    Atrial flutter    Urinary incontinence    Anxiety    GERD (gastroesophageal reflux disease)    No significant past surgical history        Vitals  T(F): 97.5 (01-26-25 @ 07:58), Max: 98.6 (01-25-25 @ 19:54)  HR: 77 (01-26-25 @ 07:58) (77 - 102)  BP: 133/92 (01-26-25 @ 07:58) (96/59 - 143/83)  RR: 18 (01-26-25 @ 07:58) (18 - 18)  SpO2: 96% (01-26-25 @ 07:58) (96% - 96%)  Wt(kg): --  CAPILLARY BLOOD GLUCOSE          Labs                          12.5   6.46  )-----------( 194      ( 26 Jan 2025 07:36 )             38.4       01-26    139  |  99  |  23[H]  ----------------------------<  129[H]  3.0[L]   |  31  |  0.79    Ca    8.9      26 Jan 2025 07:36    TPro  6.5  /  Alb  3.2[L]  /  TBili  0.5  /  DBili  x   /  AST  20  /  ALT  18  /  AlkPhos  101  01-26            Radiology Results  < from: Xray Chest 1 View-PORTABLE IMMEDIATE (Xray Chest 1 View-PORTABLE IMMEDIATE .) (01.24.25 @ 17:38) >  Findings:    Bilateral pleural effusions, right is moderate and left is trace. Right   upper lung zone hazy opacity. The cardiomediastinal silhouette is normal.   Severe bilateral glenohumeral joint disease.      < end of copied text >      Meds    MEDICATIONS  (STANDING):  albuterol/ipratropium for Nebulization 3 milliLiter(s) Nebulizer every 6 hours  apixaban 2.5 milliGRAM(s) Oral every 12 hours  azithromycin  IVPB 500 milliGRAM(s) IV Intermittent every 24 hours  digoxin     Tablet 125 MICROGram(s) Oral daily  furosemide   Injectable 20 milliGRAM(s) IV Push two times a day  guaiFENesin  milliGRAM(s) Oral every 12 hours  lacosamide 100 milliGRAM(s) Oral two times a day  metoprolol tartrate 25 milliGRAM(s) Oral every 8 hours  oseltamivir 30 milliGRAM(s) Oral two times a day  oseltamivir      oxybutynin XL 5 milliGRAM(s) Oral daily  pantoprazole    Tablet 40 milliGRAM(s) Oral before breakfast  sertraline 50 milliGRAM(s) Oral daily      MEDICATIONS  (PRN):  acetaminophen     Tablet .. 650 milliGRAM(s) Oral every 6 hours PRN Temp greater or equal to 38C (100.4F), Mild Pain (1 - 3)  benzonatate 100 milliGRAM(s) Oral every 8 hours PRN Cough         pt seen in icu [  ], reg med floor [ x  ], bed [ x ], chair at bedside [   ]  She is awake, alert, laying in bed in NAD. ECHO done. Clinically improved    REVIEW OF SYSTEMS:    CONSTITUTIONAL: No weakness, fevers or chills  EYES/ENT: No visual changes;  No vertigo or throat pain   NECK: No pain or stiffness  RESPIRATORY: No cough, wheezing, hemoptysis; No shortness of breath  CARDIOVASCULAR: No chest pain or palpitations  GASTROINTESTINAL: No abdominal or epigastric pain. No nausea, vomiting, or hematemesis; No diarrhea or constipation. No melena or hematochezia.  GENITOURINARY: No dysuria, frequency or hematuria  NEUROLOGICAL: No numbness or weakness  SKIN: No itching, burning, rashes, or lesions   All other review of systems is negative unless indicated above.    Physical Exam    General: WN/WD NAD  Neurology: A&Ox3, nonfocal, DANIELS x 4  Respiratory: Rhonchi B/L  CV: RRR, S1S2, no murmurs, rubs or gallops  Abdominal: Soft, NT, ND +BS, Last BM  Extremities: No edema, + peripheral pulses      Allergies  No Known Drug Allergies  Pineapple (Short breath)  Kiwi (Swelling)      Health Issues  Pneumonia due to infectious organism    Intubate    HTN (hypertension)    Epilepsy    Atrial flutter    Urinary incontinence    Anxiety    GERD (gastroesophageal reflux disease)    No significant past surgical history        Vitals  T(F): 97.5 (01-26-25 @ 07:58), Max: 98.6 (01-25-25 @ 19:54)  HR: 77 (01-26-25 @ 07:58) (77 - 102)  BP: 133/92 (01-26-25 @ 07:58) (96/59 - 143/83)  RR: 18 (01-26-25 @ 07:58) (18 - 18)  SpO2: 96% (01-26-25 @ 07:58) (96% - 96%)  Wt(kg): --  CAPILLARY BLOOD GLUCOSE          Labs                          12.5   6.46  )-----------( 194      ( 26 Jan 2025 07:36 )             38.4       01-26    139  |  99  |  23[H]  ----------------------------<  129[H]  3.0[L]   |  31  |  0.79    Ca    8.9      26 Jan 2025 07:36    TPro  6.5  /  Alb  3.2[L]  /  TBili  0.5  /  DBili  x   /  AST  20  /  ALT  18  /  AlkPhos  101  01-26            Radiology Results  < from: Xray Chest 1 View-PORTABLE IMMEDIATE (Xray Chest 1 View-PORTABLE IMMEDIATE .) (01.24.25 @ 17:38) >  Findings:    Bilateral pleural effusions, right is moderate and left is trace. Right   upper lung zone hazy opacity. The cardiomediastinal silhouette is normal.   Severe bilateral glenohumeral joint disease.      < end of copied text >    < from: TTE W or WO Ultrasound Enhancing Agent (01.24.25 @ 11:25) >     CONCLUSIONS:      1. Left ventricular cavity is small. Left ventricular wall thickness is normal. Left ventricular systolic function is normal. There are no regional wall motion abnormalities seen.   2. There is mild (grade 1) left ventricular diastolic dysfunction.   3. Normal right ventricular cavity size, with normal wall thickness, and reduced right ventricular systolic function. Tricuspid annular plane systolic excursion (TAPSE) is 1.6 cm (normal >=1.7 cm).   4. The right atrium is dilated.   5. Left atrium is moderately dilated.   6. Moderate mitral regurgitation.   7. Moderate tricuspid regurgitation.   8. Estimated pulmonary artery systolic pressure is 45 mmHg, consistent with mild pulmonary hypertension.   9. Left pleural effusion noted.  10. Mild aortic regurgitation.  11. Mild pulmonic regurgitation.  12. Moderate aortic stenosis.  13. No pericardial effusion seen.  14. No prior echocardiogram is available forcomparison.      < end of copied text >    Meds    MEDICATIONS  (STANDING):  albuterol/ipratropium for Nebulization 3 milliLiter(s) Nebulizer every 6 hours  apixaban 2.5 milliGRAM(s) Oral every 12 hours  azithromycin  IVPB 500 milliGRAM(s) IV Intermittent every 24 hours  digoxin     Tablet 125 MICROGram(s) Oral daily  furosemide   Injectable 20 milliGRAM(s) IV Push two times a day  guaiFENesin  milliGRAM(s) Oral every 12 hours  lacosamide 100 milliGRAM(s) Oral two times a day  metoprolol tartrate 25 milliGRAM(s) Oral every 8 hours  oseltamivir 30 milliGRAM(s) Oral two times a day  oseltamivir      oxybutynin XL 5 milliGRAM(s) Oral daily  pantoprazole    Tablet 40 milliGRAM(s) Oral before breakfast  sertraline 50 milliGRAM(s) Oral daily      MEDICATIONS  (PRN):  acetaminophen     Tablet .. 650 milliGRAM(s) Oral every 6 hours PRN Temp greater or equal to 38C (100.4F), Mild Pain (1 - 3)  benzonatate 100 milliGRAM(s) Oral every 8 hours PRN Cough

## 2025-01-26 NOTE — PROGRESS NOTE ADULT - PROBLEM SELECTOR PLAN 2
Oxygen supp to keep SPO2 90% or above  Duoneb 1 unit dose neb Q6H PRN  Solumedrol IV  follow up ABGs  pulm toilet  Asp precautions  CXR follow up.

## 2025-01-26 NOTE — PROGRESS NOTE ADULT - SUBJECTIVE AND OBJECTIVE BOX
Date of Service 01-26-25 @ 11:33    CHIEF COMPLAINT:Patient is a 89y old  Female who presents with a chief complaint of AHRF 2/2 Flu vs PNA .Pt appears comfortable.    	  REVIEW OF SYSTEMS:  CONSTITUTIONAL: No fever, weight loss, or fatigue  EYES: No eye pain, visual disturbances, or discharge  ENT:  No difficulty hearing, tinnitus, vertigo; No sinus or throat pain  NECK: No pain or stiffness  RESPIRATORY: No cough, wheezing, chills or hemoptysis; No Shortness of Breath  CARDIOVASCULAR: No chest pain, palpitations, passing out, dizziness, or leg swelling  GASTROINTESTINAL: No abdominal or epigastric pain. No nausea, vomiting, or hematemesis; No diarrhea or constipation. No melena or hematochezia.  GENITOURINARY: No dysuria, frequency, hematuria, or incontinence  NEUROLOGICAL: No headaches, memory loss, loss of strength, numbness, or tremors  SKIN: No itching, burning, rashes, or lesions   LYMPH Nodes: No enlarged glands  ENDOCRINE: No heat or cold intolerance; No hair loss  MUSCULOSKELETAL: No joint pain or swelling; No muscle, back, or extremity pain  PSYCHIATRIC: No depression, anxiety, mood swings, or difficulty sleeping  HEME/LYMPH: No easy bruising, or bleeding gums  ALLERGY AND IMMUNOLOGIC: No hives or eczema	        PHYSICAL EXAM:  T(C): 36.8 (01-26-25 @ 11:19), Max: 37 (01-25-25 @ 19:54)  HR: 83 (01-26-25 @ 11:19) (77 - 102)  BP: 102/57 (01-26-25 @ 11:19) (102/57 - 143/83)  RR: 18 (01-26-25 @ 11:19) (18 - 18)  SpO2: 97% (01-26-25 @ 11:19) (96% - 97%)  Wt(kg): --  I&O's Summary    25 Jan 2025 07:01  -  26 Jan 2025 07:00  --------------------------------------------------------  IN: 150 mL / OUT: 1100 mL / NET: -950 mL        Appearance: Normal	  HEENT:   Normal oral mucosa, PERRL, EOMI	  Lymphatic: No lymphadenopathy  Cardiovascular: Normal S1 S2, No JVD, No murmurs, No edema  Respiratory: Dec BS at bases  Psychiatry: A & O x 3, Mood & affect appropriate  Gastrointestinal:  Soft, Non-tender, + BS	  Skin: No rashes, No ecchymoses, No cyanosis	  Neurologic: Non-focal  Extremities: Normal range of motion, No clubbing, cyanosis or edema  Vascular: Peripheral pulses palpable 2+ bilaterally    MEDICATIONS  (STANDING):  albuterol/ipratropium for Nebulization 3 milliLiter(s) Nebulizer every 6 hours  apixaban 2.5 milliGRAM(s) Oral every 12 hours  azithromycin  IVPB 500 milliGRAM(s) IV Intermittent every 24 hours  digoxin     Tablet 125 MICROGram(s) Oral daily  furosemide   Injectable 20 milliGRAM(s) IV Push two times a day  guaiFENesin  milliGRAM(s) Oral every 12 hours  lacosamide 100 milliGRAM(s) Oral two times a day  metoprolol tartrate 25 milliGRAM(s) Oral every 8 hours  oseltamivir 30 milliGRAM(s) Oral two times a day  oseltamivir      oxybutynin XL 5 milliGRAM(s) Oral daily  pantoprazole    Tablet 40 milliGRAM(s) Oral before breakfast  potassium chloride   Powder 20 milliEquivalent(s) Oral two times a day  sertraline 50 milliGRAM(s) Oral daily      TELEMETRY: afib 70's	    	  	  LABS:	 	                        12.5   6.46  )-----------( 194      ( 26 Jan 2025 07:36 )             38.4     01-26    139  |  99  |  23[H]  ----------------------------<  129[H]  3.0[L]   |  31  |  0.79    Ca    8.9      26 Jan 2025 07:36    TPro  6.5  /  Alb  3.2[L]  /  TBili  0.5  /  DBili  x   /  AST  20  /  ALT  18  /  AlkPhos  101  01-26    proBNP:   Lipid Profile: Cholesterol 105  LDL --  HDL 39  TG 65  Ldl calc 52  Ratio --    HgA1c:   TSH: Thyroid Stimulating Hormone, Serum: 1.69 uU/mL (01-25 @ 07:29)  Thyroid Stimulating Hormone, Serum: 1.58 uU/mL (01-24 @ 08:27)

## 2025-01-27 LAB
ALBUMIN SERPL ELPH-MCNC: 2.9 G/DL — LOW (ref 3.5–5)
ALP SERPL-CCNC: 104 U/L — SIGNIFICANT CHANGE UP (ref 40–120)
ALT FLD-CCNC: 20 U/L DA — SIGNIFICANT CHANGE UP (ref 10–60)
ANION GAP SERPL CALC-SCNC: 11 MMOL/L — SIGNIFICANT CHANGE UP (ref 5–17)
AST SERPL-CCNC: 18 U/L — SIGNIFICANT CHANGE UP (ref 10–40)
BILIRUB SERPL-MCNC: 0.6 MG/DL — SIGNIFICANT CHANGE UP (ref 0.2–1.2)
BUN SERPL-MCNC: 21 MG/DL — HIGH (ref 7–18)
CALCIUM SERPL-MCNC: 9 MG/DL — SIGNIFICANT CHANGE UP (ref 8.4–10.5)
CHLORIDE SERPL-SCNC: 98 MMOL/L — SIGNIFICANT CHANGE UP (ref 96–108)
CO2 SERPL-SCNC: 29 MMOL/L — SIGNIFICANT CHANGE UP (ref 22–31)
CREAT SERPL-MCNC: 0.77 MG/DL — SIGNIFICANT CHANGE UP (ref 0.5–1.3)
EGFR: 71 ML/MIN/1.73M2 — SIGNIFICANT CHANGE UP
GLUCOSE SERPL-MCNC: 109 MG/DL — HIGH (ref 70–99)
HCT VFR BLD CALC: 40.9 % — SIGNIFICANT CHANGE UP (ref 34.5–45)
HGB BLD-MCNC: 13.3 G/DL — SIGNIFICANT CHANGE UP (ref 11.5–15.5)
MCHC RBC-ENTMCNC: 27.2 PG — SIGNIFICANT CHANGE UP (ref 27–34)
MCHC RBC-ENTMCNC: 32.5 G/DL — SIGNIFICANT CHANGE UP (ref 32–36)
MCV RBC AUTO: 83.6 FL — SIGNIFICANT CHANGE UP (ref 80–100)
NRBC # BLD: 0 /100 WBCS — SIGNIFICANT CHANGE UP (ref 0–0)
NRBC BLD-RTO: 0 /100 WBCS — SIGNIFICANT CHANGE UP (ref 0–0)
PLATELET # BLD AUTO: 217 K/UL — SIGNIFICANT CHANGE UP (ref 150–400)
POTASSIUM SERPL-MCNC: 4.3 MMOL/L — SIGNIFICANT CHANGE UP (ref 3.5–5.3)
POTASSIUM SERPL-SCNC: 4.3 MMOL/L — SIGNIFICANT CHANGE UP (ref 3.5–5.3)
PROT SERPL-MCNC: 6.4 G/DL — SIGNIFICANT CHANGE UP (ref 6–8.3)
RBC # BLD: 4.89 M/UL — SIGNIFICANT CHANGE UP (ref 3.8–5.2)
RBC # FLD: 14.6 % — HIGH (ref 10.3–14.5)
SODIUM SERPL-SCNC: 138 MMOL/L — SIGNIFICANT CHANGE UP (ref 135–145)
WBC # BLD: 6.49 K/UL — SIGNIFICANT CHANGE UP (ref 3.8–10.5)
WBC # FLD AUTO: 6.49 K/UL — SIGNIFICANT CHANGE UP (ref 3.8–10.5)

## 2025-01-27 PROCEDURE — 99222 1ST HOSP IP/OBS MODERATE 55: CPT

## 2025-01-27 RX ORDER — CEFTRIAXONE 250 MG/1
1000 INJECTION, POWDER, FOR SOLUTION INTRAMUSCULAR; INTRAVENOUS ONCE
Refills: 0 | Status: COMPLETED | OUTPATIENT
Start: 2025-01-27 | End: 2025-01-27

## 2025-01-27 RX ADMIN — LACOSAMIDE 100 MILLIGRAM(S): 200 TABLET, FILM COATED ORAL at 17:10

## 2025-01-27 RX ADMIN — OSELTAMIVIR PHOSPHATE 30 MILLIGRAM(S): 75 CAPSULE ORAL at 17:10

## 2025-01-27 RX ADMIN — IPRATROPIUM BROMIDE AND ALBUTEROL SULFATE 3 MILLILITER(S): .5; 2.5 SOLUTION RESPIRATORY (INHALATION) at 16:17

## 2025-01-27 RX ADMIN — Medication 25 MILLIGRAM(S): at 05:43

## 2025-01-27 RX ADMIN — IPRATROPIUM BROMIDE AND ALBUTEROL SULFATE 3 MILLILITER(S): .5; 2.5 SOLUTION RESPIRATORY (INHALATION) at 20:12

## 2025-01-27 RX ADMIN — OSELTAMIVIR PHOSPHATE 30 MILLIGRAM(S): 75 CAPSULE ORAL at 05:24

## 2025-01-27 RX ADMIN — PANTOPRAZOLE 40 MILLIGRAM(S): 20 TABLET, DELAYED RELEASE ORAL at 05:25

## 2025-01-27 RX ADMIN — OXYBUTYNIN CHLORIDE 5 MILLIGRAM(S): 5 TABLET, EXTENDED RELEASE ORAL at 11:19

## 2025-01-27 RX ADMIN — ACETAMINOPHEN 650 MILLIGRAM(S): 160 SUSPENSION ORAL at 13:54

## 2025-01-27 RX ADMIN — POTASSIUM CHLORIDE 20 MILLIEQUIVALENT(S): 750 TABLET, EXTENDED RELEASE ORAL at 05:20

## 2025-01-27 RX ADMIN — Medication 20 MILLIGRAM(S): at 05:21

## 2025-01-27 RX ADMIN — Medication 600 MILLIGRAM(S): at 17:10

## 2025-01-27 RX ADMIN — CEFTRIAXONE 100 MILLIGRAM(S): 250 INJECTION, POWDER, FOR SOLUTION INTRAMUSCULAR; INTRAVENOUS at 18:21

## 2025-01-27 RX ADMIN — AZITHROMYCIN DIHYDRATE 255 MILLIGRAM(S): 500 TABLET, FILM COATED ORAL at 05:20

## 2025-01-27 RX ADMIN — Medication 25 MILLIGRAM(S): at 13:54

## 2025-01-27 RX ADMIN — Medication 25 MILLIGRAM(S): at 22:56

## 2025-01-27 RX ADMIN — Medication 50 MILLIGRAM(S): at 11:19

## 2025-01-27 RX ADMIN — IPRATROPIUM BROMIDE AND ALBUTEROL SULFATE 3 MILLILITER(S): .5; 2.5 SOLUTION RESPIRATORY (INHALATION) at 02:41

## 2025-01-27 RX ADMIN — Medication 600 MILLIGRAM(S): at 05:19

## 2025-01-27 RX ADMIN — APIXABAN 2.5 MILLIGRAM(S): 5 TABLET, FILM COATED ORAL at 05:20

## 2025-01-27 RX ADMIN — IPRATROPIUM BROMIDE AND ALBUTEROL SULFATE 3 MILLILITER(S): .5; 2.5 SOLUTION RESPIRATORY (INHALATION) at 09:35

## 2025-01-27 RX ADMIN — LACOSAMIDE 100 MILLIGRAM(S): 200 TABLET, FILM COATED ORAL at 05:19

## 2025-01-27 RX ADMIN — APIXABAN 2.5 MILLIGRAM(S): 5 TABLET, FILM COATED ORAL at 17:09

## 2025-01-27 RX ADMIN — Medication 125 MICROGRAM(S): at 05:19

## 2025-01-27 NOTE — PROGRESS NOTE ADULT - PROBLEM SELECTOR PLAN 4
Cardiomegaly and B/L PE most likely 2/2 CHF  lasix trial  Echo noted  Cardio follow up. Cardiomegaly and B/L PE most likely 2/2 CHF  lasix po  Echo noted  Cardio follow up.  DC Planning

## 2025-01-27 NOTE — PROGRESS NOTE ADULT - SUBJECTIVE AND OBJECTIVE BOX
NP Note discussed with  primary attending    Patient is a 89y old  Female who presents with a chief complaint of AHRF 2/2 Flu vs PNA (27 Jan 2025 10:45)      INTERVAL HPI/OVERNIGHT EVENTS: Pt seen w/ dtr-Mary Beth at bedside.  Dtr and pt deny new complaints or concerns.  Pt found in RA w/o SOB.  Dtr expresses concern that pt was rec rehab and states she does not want pt to go o rehab.  Dtr expressed wished of taking pt home w/ [facility].  -Juanita # [id number].      MEDICATIONS  (STANDING):  albuterol/ipratropium for Nebulization 3 milliLiter(s) Nebulizer every 6 hours  apixaban 2.5 milliGRAM(s) Oral every 12 hours  cefTRIAXone   IVPB 1000 milliGRAM(s) IV Intermittent once  digoxin     Tablet 125 MICROGram(s) Oral daily  furosemide    Tablet 20 milliGRAM(s) Oral daily  guaiFENesin  milliGRAM(s) Oral every 12 hours  lacosamide 100 milliGRAM(s) Oral two times a day  metoprolol tartrate 25 milliGRAM(s) Oral every 8 hours  oseltamivir 30 milliGRAM(s) Oral two times a day  oseltamivir      oxybutynin XL 5 milliGRAM(s) Oral daily  pantoprazole    Tablet 40 milliGRAM(s) Oral before breakfast  sertraline 50 milliGRAM(s) Oral daily    MEDICATIONS  (PRN):  acetaminophen     Tablet .. 650 milliGRAM(s) Oral every 6 hours PRN Temp greater or equal to 38C (100.4F), Mild Pain (1 - 3)  benzonatate 100 milliGRAM(s) Oral every 8 hours PRN Cough      __________________________________________________  REVIEW OF SYSTEMS:    CONSTITUTIONAL: No fever  EYES: No acute visual disturbances  NECK: No pain or stiffness  RESPIRATORY: Intermittent cough; No shortness of breath  CARDIOVASCULAR: No chest pain, no palpitations  GASTROINTESTINAL: No pain. No nausea or vomiting.  No diarrhea   NEUROLOGICAL: No headache or numbness, no tremors  MUSCULOSKELETAL: No joint pain, no muscle pain  GENITOURINARY: No dysuria, no frequency, no hesitancy  PSYCHIATRY: (+) Depression & Anxiety  ALL OTHER  ROS negative        Vital Signs Last 24 Hrs  T(C): 36.7 (27 Jan 2025 16:44), Max: 37.1 (26 Jan 2025 23:12)  T(F): 98.1 (27 Jan 2025 16:44), Max: 98.8 (27 Jan 2025 11:39)  HR: 94 (27 Jan 2025 16:44) (84 - 94)  BP: 103/73 (27 Jan 2025 16:44) (103/56 - 136/80)  RR: 18 (27 Jan 2025 16:44) (18 - 18)  SpO2: 98% (27 Jan 2025 16:44) (95% - 98%)    Parameters below as of 27 Jan 2025 16:44  Patient On (Oxygen Delivery Method): room air        ________________________________________________  PHYSICAL EXAM:  GENERAL: NAD, frail, [place]   HEENT: Normocephalic;  conjunctivae and sclerae clear; moist mucous membranes   NECK : Supple  CHEST/LUNG: LQ decreased BS to auscultation bilaterally with good air entry   HEART: S1 S2  regular; no murmurs, gallops or rubs  ABDOMEN: Soft, Nontender, Nondistended; Bowel sounds present x 4 quad  EXTREMITIES: No cyanosis; no edema; no calf tenderness  SKIN: Warm and dry; no rash  NERVOUS SYSTEM:  Awake and alert; Oriented to place & person, not time; no new deficits    _________________________________________________  LABS:                        13.3   6.49  )-----------( 217      ( 27 Jan 2025 07:23 )             40.9     01-27    138  |  98  |  21[H]  ----------------------------<  109[H]  4.3   |  29  |  0.77    Ca    9.0      27 Jan 2025 07:23    TPro  6.4  /  Alb  2.9[L]  /  TBili  0.6  /  DBili  x   /  AST  18  /  ALT  20  /  AlkPhos  104  01-27      Urinalysis Basic - ( 27 Jan 2025 07:23 )    Color: x / Appearance: x / SG: x / pH: x  Gluc: 109 mg/dL / Ketone: x  / Bili: x / Urobili: x   Blood: x / Protein: x / Nitrite: x   Leuk Esterase: x / RBC: x / WBC x   Sq Epi: x / Non Sq Epi: x / Bacteria: x      CAPILLARY BLOOD GLUCOSE            RADIOLOGY & ADDITIONAL TESTS:    Imaging Personally Reviewed:  YES/NO    Consultant(s) Notes Reviewed:   YES/ No    Care Discussed with Consultants :     Plan of care was discussed with patient and /or primary care giver; all questions and concerns were addressed and care was aligned with patient's wishes.

## 2025-01-27 NOTE — DISCHARGE NOTE PROVIDER - CARE PROVIDER_API CALL
Brandyn Hernández  Pulmonary Disease  [address] [place] [id number]  Phone: [phone]  Fax: [phone]  Follow Up Time: 1 week    Lilli Broderick  Endocrinology/Metab/Diabetes  [address] [place] [address] [address]  [place] [address]  Phone: [phone]  Fax: [phone]  Follow Up Time: 1 week    CHARAN MEDEIROS  [address] [place] [address]  Phone: [phone]  Fax: [phone]  Follow Up Time: 1 week   Brandyn Hernández  Pulmonary Disease  [address] [place] [address]  Phone: [phone]  Fax: [phone]  Follow Up Time: 1 week    Lilli Broderick  Endocrinology/Metab/Diabetes  [address] [facility] [address]  [place] [address]  Phone: [phone]  Fax: [phone]  Follow Up Time: 1 week    CHARAN MEDEIROS  [address] [place] [address]  Phone: [phone]  Fax: [phone]  Follow Up Time: 1 week    Lashawn Mayfield  Cardiology  [address] [place] [address]  Phone: [phone]  Fax: [phone]  Follow Up Time: 1 week

## 2025-01-27 NOTE — CONSULT NOTE ADULT - SUBJECTIVE AND OBJECTIVE BOX
ENDOCRINE INITIAL CONSULT NOTE:    Patient is a 89y old  Female who presents with a chief complaint of AHRF 2/2 Flu vs PNA (27 Jan 2025 10:45)      HPI:  89-year-old female, AAOx2-3,  with history of A-fib on Eliquis, HTN, seizures, GERD, anxiety, depression who presents to the ED for cough, cold, hypoxia. As per granddaughter, patient had developed leg edema/SOB 2 months ago. At that time, Chest x-ray showed pleural effusion.She was evaluated by cardiologist 3 weeks ago, given Lasix 20 mg for total 6 days with improvement. Patient now presents to ED with known Flu A positive sick contacts at home. Patient p/w clear sputum productive coughing, chest congestion, runny nose, for past 4 days, worse over the past 2 days with SOB and weakness. She denies chest pain, vomiting, loss of appetite. Patient went to urgent care and was diagnosed with flu A.  However, later on at home, granddaughter noticed patient's pulse ox dropped to upper 80s. In ED, imaging noted with cardiomegaly and bilateral small to moderate-sized pleural effusion. Groundglass and solid density 2.2 cm nodule to right apex, with a solid component measuring 0.9 cm. Flu A +. Started on 2 L NC for hypoxia. Adm to medicine for AHRF 2/2 Flu A vs PNA.  (23 Jan 2025 02:28)    89y Female      PAST MEDICAL & SURGICAL HISTORY:  HTN (hypertension)      Epilepsy      Atrial flutter      Urinary incontinence      Anxiety      GERD (gastroesophageal reflux disease)      No significant past surgical history          FAMILY HISTORY:        MEDICATIONS  (STANDING):  albuterol/ipratropium for Nebulization 3 milliLiter(s) Nebulizer every 6 hours  apixaban 2.5 milliGRAM(s) Oral every 12 hours  cefTRIAXone   IVPB 1000 milliGRAM(s) IV Intermittent once  digoxin     Tablet 125 MICROGram(s) Oral daily  furosemide    Tablet 20 milliGRAM(s) Oral daily  guaiFENesin  milliGRAM(s) Oral every 12 hours  lacosamide 100 milliGRAM(s) Oral two times a day  metoprolol tartrate 25 milliGRAM(s) Oral every 8 hours  oseltamivir 30 milliGRAM(s) Oral two times a day  oseltamivir      oxybutynin XL 5 milliGRAM(s) Oral daily  pantoprazole    Tablet 40 milliGRAM(s) Oral before breakfast  sertraline 50 milliGRAM(s) Oral daily    MEDICATIONS  (PRN):  acetaminophen     Tablet .. 650 milliGRAM(s) Oral every 6 hours PRN Temp greater or equal to 38C (100.4F), Mild Pain (1 - 3)  benzonatate 100 milliGRAM(s) Oral every 8 hours PRN Cough      Physical Examination  Vital Signs Last 24 Hrs  T(C): 36.7 (27 Jan 2025 16:44), Max: 37.1 (26 Jan 2025 23:12)  T(F): 98.1 (27 Jan 2025 16:44), Max: 98.8 (27 Jan 2025 11:39)  HR: 94 (27 Jan 2025 16:44) (84 - 94)  BP: 103/73 (27 Jan 2025 16:44) (103/56 - 136/80)  BP(mean): --  RR: 18 (27 Jan 2025 16:44) (18 - 18)  SpO2: 98% (27 Jan 2025 16:44) (95% - 98%)    Parameters below as of 27 Jan 2025 16:44  Patient On (Oxygen Delivery Method): room air      PHYSICAL EXAM:  GENERAL: NAD, speaks in full sentences, no signs of respiratory distress  HEAD:  Atraumatic, Normocephalic  EYES: EOMI, PERRLA, conjunctiva and sclera clear  NECK: Supple, No JVD  CHEST/LUNG: +diffuse rhonchi and rales b/l   HEART: Irregular rate and rhythm; No murmurs;   ABDOMEN: Soft, Nontender, Nondistended; Bowel sounds present; No guarding  EXTREMITIES:  2+ Peripheral Pulses, No cyanosis or edema  PSYCH: AAOx2  NEUROLOGY: non-focal  SKIN: No rashes or lesions    Labs:                        13.3   6.49  )-----------( 217      ( 27 Jan 2025 07:23 )             40.9     01-27    138  |  98  |  21[H]  ----------------------------<  109[H]  4.3   |  29  |  0.77    Ca    9.0      27 Jan 2025 07:23    TPro  6.4  /  Alb  2.9[L]  /  TBili  0.6  /  DBili  x   /  AST  18  /  ALT  20  /  AlkPhos  104  01-27          Urinalysis Basic - ( 27 Jan 2025 07:23 )    Color: x / Appearance: x / SG: x / pH: x  Gluc: 109 mg/dL / Ketone: x  / Bili: x / Urobili: x   Blood: x / Protein: x / Nitrite: x   Leuk Esterase: x / RBC: x / WBC x   Sq Epi: x / Non Sq Epi: x / Bacteria: x      CAPILLARY BLOOD GLUCOSE          Radiology and diagnostic studies:      Assessment and Plan:  89y Female   Endocrinology is consulted Thyroid Nodule seen on imaging.     CHEST WALL AND LOWER NECK: There is a 1.6 cm partially calcified right   thyroid nodule..  TSH wnl 1.69  f/u free t4, t3    Recommendation:   given calcifications and size >1.5 cm, recommend to obtain thyroid u/s    Endocrine initial consult note:    89 year old  Female who presents with a chief complaint of AHRF 2/2 Flu vs PNA (27 Jan 2025 10:45)    HPI:  89-year-old female, AAOx2-3,  with history of A-fib on Eliquis, HTN, seizures, GERD, anxiety, depression who presents to the ED for cough, cold, hypoxia. As per granddaughter, patient had developed leg edema/SOB 2 months ago. At that time, Chest x-ray showed pleural effusion. She was evaluated by cardiologist 3 weeks ago, given Lasix 20 mg for total 6 days with improvement. Patient now presents to ED with known Flu A positive sick contacts at home. Patient p/w clear sputum productive coughing, chest congestion, runny nose, for past 4 days, worse over the past 2 days with SOB and weakness. She denies chest pain, vomiting, loss of appetite. Patient went to urgent care and was diagnosed with flu A.  However, later on at home, granddaughter noticed patient's pulse ox dropped to upper 80s. In ED, imaging noted with cardiomegaly and bilateral small to moderate-sized pleural effusion. Groundglass and solid density 2.2 cm nodule to right apex, with a solid component measuring 0.9 cm. Flu A +. Started on 2 L NC for hypoxia. Adm to medicine for AHRF 2/2 Flu A vs PNA.  (23 Jan 2025 02:28)    Patient not a good historian. unable to provide hx regarding thyroid    ROS:  Unable to obtain    Past Medical and Surgical Hx:  HTN (hypertension)  Epilepsy  Atrial flutter  Urinary incontinence  Anxiety  GERD (gastroesophageal reflux disease)  No significant past surgical history    Family hx and Social Hx:  Unable to obtain    Medications  (Standing):  albuterol/ipratropium for Nebulization 3 milliLiter(s) Nebulizer every 6 hours  apixaban 2.5 milliGRAM(s) Oral every 12 hours  cefTRIAXone   IVPB 1000 milliGRAM(s) IV Intermittent once  digoxin     Tablet 125 MICROGram(s) Oral daily  furosemide    Tablet 20 milliGRAM(s) Oral daily  guaiFENesin  milliGRAM(s) Oral every 12 hours  lacosamide 100 milliGRAM(s) Oral two times a day  metoprolol tartrate 25 milliGRAM(s) Oral every 8 hours  oseltamivir 30 milliGRAM(s) Oral two times a day  oseltamivir      oxybutynin XL 5 milliGRAM(s) Oral daily  pantoprazole    Tablet 40 milliGRAM(s) Oral before breakfast  sertraline 50 milliGRAM(s) Oral daily    Medications (PRN):  acetaminophen     Tablet .. 650 milliGRAM(s) Oral every 6 hours PRN Temp greater or equal to 38C (100.4F), Mild Pain (1 - 3)  benzonatate 100 milliGRAM(s) Oral every 8 hours PRN Cough    Physical Examination  Vital Signs Last 24 Hrs  T(C): 36.7 (27 Jan 2025 16:44), Max: 37.1 (26 Jan 2025 23:12)  T(F): 98.1 (27 Jan 2025 16:44), Max: 98.8 (27 Jan 2025 11:39)  HR: 94 (27 Jan 2025 16:44) (84 - 94)  BP: 103/73 (27 Jan 2025 16:44) (103/56 - 136/80)  BP(mean): --  RR: 18 (27 Jan 2025 16:44) (18 - 18)  SpO2: 98% (27 Jan 2025 16:44) (95% - 98%)    PHYSICAL EXAM:  GENERAL: NAD, speaks in full sentences, no signs of respiratory distress  HEAD:  Atraumatic, Normocephalic  EYES: EOMI, PERRLA, conjunctiva and sclera clear  NECK: Supple, No JVD  CHEST/LUNG: +diffuse rhonchi and rales b/l   HEART: Irregular rate and rhythm; No murmurs;   ABDOMEN: Soft, Nontender, Nondistended; Bowel sounds present; No guarding  EXTREMITIES:  2+ Peripheral Pulses, No cyanosis or edema  PSYCH: AAOx2  NEUROLOGY: non-focal  SKIN: No rashes or lesions    Labs:                        13.3   6.49  )-----------( 217      ( 27 Jan 2025 07:23 )             40.9     01-27    138  |  98  |  21[H]  ----------------------------<  109[H]  4.3   |  29  |  0.77    Ca    9.0      27 Jan 2025 07:23    TPro  6.4  /  Alb  2.9[L]  /  TBili  0.6  /  DBili  x   /  AST  18  /  ALT  20  /  AlkPhos  104  01-27          Urinalysis Basic - ( 27 Jan 2025 07:23 )    Color: x / Appearance: x / SG: x / pH: x  Gluc: 109 mg/dL / Ketone: x  / Bili: x / Urobili: x   Blood: x / Protein: x / Nitrite: x   Leuk Esterase: x / RBC: x / WBC x   Sq Epi: x / Non Sq Epi: x / Bacteria: x      CAPILLARY BLOOD GLUCOSE          Radiology and diagnostic studies:      Assessment and Plan:  89y Female   Endocrinology is consulted Thyroid Nodule seen on imaging.     CHEST WALL AND LOWER NECK: There is a 1.6 cm partially calcified right   thyroid nodule..  TSH wnl 1.69  f/u free t4, t3    Recommendation:   given calcifications and size >1.5 cm, recommend to obtain thyroid u/s    Endocrine initial consult note:    89 year old  Female who presents with a chief complaint of AHRF 2/2 Flu vs PNA (27 Jan 2025 10:45)    HPI:  89-year-old female, AAOx2-3,  with history of A-fib on Eliquis, HTN, seizures, GERD, anxiety, depression who presents to the ED for cough, cold, hypoxia. As per granddaughter, patient had developed leg edema/SOB 2 months ago. At that time, Chest x-ray showed pleural effusion. She was evaluated by cardiologist 3 weeks ago, given Lasix 20 mg for total 6 days with improvement. Patient now presents to ED with known Flu A positive sick contacts at home. Patient p/w clear sputum productive coughing, chest congestion, runny nose, for past 4 days, worse over the past 2 days with SOB and weakness. She denies chest pain, vomiting, loss of appetite. Patient went to urgent care and was diagnosed with flu A.  However, later on at home, granddaughter noticed patient's pulse ox dropped to upper 80s. In ED, imaging noted with cardiomegaly and bilateral small to moderate-sized pleural effusion. Groundglass and solid density 2.2 cm nodule to right apex, with a solid component measuring 0.9 cm. Flu A +. Started on 2 L NC for hypoxia. Adm to medicine for AHRF 2/2 Flu A vs PNA.  (23 Jan 2025 02:28)    Patient not a good historian. unable to provide hx regarding thyroid    ROS:  Unable to obtain    Past Medical and Surgical Hx:  HTN (hypertension)  Epilepsy  Atrial flutter  Urinary incontinence  Anxiety  GERD (gastroesophageal reflux disease)  No significant past surgical history    Family hx and Social Hx:  Unable to obtain    Medications  (Standing):  albuterol/ipratropium for Nebulization 3 milliLiter(s) Nebulizer every 6 hours  apixaban 2.5 milliGRAM(s) Oral every 12 hours  cefTRIAXone   IVPB 1000 milliGRAM(s) IV Intermittent once  digoxin     Tablet 125 MICROGram(s) Oral daily  furosemide    Tablet 20 milliGRAM(s) Oral daily  guaiFENesin  milliGRAM(s) Oral every 12 hours  lacosamide 100 milliGRAM(s) Oral two times a day  metoprolol tartrate 25 milliGRAM(s) Oral every 8 hours  oseltamivir 30 milliGRAM(s) Oral two times a day  oseltamivir      oxybutynin XL 5 milliGRAM(s) Oral daily  pantoprazole    Tablet 40 milliGRAM(s) Oral before breakfast  sertraline 50 milliGRAM(s) Oral daily    Medications (PRN):  acetaminophen     Tablet .. 650 milliGRAM(s) Oral every 6 hours PRN Temp greater or equal to 38C (100.4F), Mild Pain (1 - 3)  benzonatate 100 milliGRAM(s) Oral every 8 hours PRN Cough    Physical Examination  Vital Signs Last 24 Hrs  T(C): 36.7 (27 Jan 2025 16:44), Max: 37.1 (26 Jan 2025 23:12)  T(F): 98.1 (27 Jan 2025 16:44), Max: 98.8 (27 Jan 2025 11:39)  HR: 94 (27 Jan 2025 16:44) (84 - 94)  BP: 103/73 (27 Jan 2025 16:44) (103/56 - 136/80)  BP(mean): --  RR: 18 (27 Jan 2025 16:44) (18 - 18)  SpO2: 98% (27 Jan 2025 16:44) (95% - 98%)    PHYSICAL EXAM:  GENERAL: NAD, speaks in full sentences, no signs of respiratory distress  HEAD:  Atraumatic, Normocephalic  EYES: EOMI, PERRLA, conjunctiva and sclera clear  NECK: Supple, No JVD  CHEST/LUNG: +diffuse rhonchi and rales b/l   HEART: Irregular rate and rhythm; No murmurs;   ABDOMEN: Soft, Nontender, Nondistended; Bowel sounds present; No guarding  EXTREMITIES:  2+ Peripheral Pulses, No cyanosis or edema  PSYCH: AAOx2  NEUROLOGY: non-focal  SKIN: No rashes or lesions    Labs:                        13.3   6.49  )-----------( 217      ( 27 Jan 2025 07:23 )             40.9   01-27  138  |  98  |  21[H]  ----------------------------<  109[H]  4.3   |  29  |  0.77  Ca    9.0      27 Jan 2025 07:23  TPro  6.4  /  Alb  2.9[L]  /  TBili  0.6  /  DBili  x   /  AST  18  /  ALT  20  /  AlkPhos  104  01-27    Radiology and diagnostic studies:    PROCEDURE:  CT of the Chest was performed.  Sagittal and coronal reformats were performed.    FINDINGS:    LUNGS AND LARGE AIRWAYS: Patent central airways. Bilateral compressive   and linear atelectasis. There is a 2.2 cm right apical nodule comprised   of groundglass and solid density. The solid component measures 9 mm.   Question mild fibrotic changes peripherally.  PLEURA: There are small to moderate-sized bilateral pleural effusions.   Fluid is seen tracking along the right fissure.  VESSELS: Aortic calcifications. Coronary artery calcifications.  HEART: Heart size is enlarged. No pericardial effusion. Valvular   calcifications are noted.  MEDIASTINUM AND VERONIKA: No pathologically enlarged lymph nodes identified.   Increased number of nonenlarged mediastinal lymph nodes are appreciated..  CHEST WALL AND LOWER NECK: There is a 1.6 cm partially calcified right   thyroid nodule..  VISUALIZED UPPER ABDOMEN: Calcified granulomata of the liver appreciated.   There is a very small hiatal hernia. BONES: There is generalized   osteopenia. Degenerative changes of the spine appreciated. There is a   well marginated lucent lesion of the right scapula, which would better   evaluated with MRI. There is an exaggerated kyphosis.    IMPRESSION:  Cardiomegaly and bilateral small to moderate-sized pleural effusions.    Groundglass and solid density 2.2 cm nodule the right apex, with a solid   component measuring 0.9 cm. while this may be infectious in nature, a   single part solid nodule with the solid component measuring greater than   6 mm should be considered suspicious for malignancy. Short-term 3 month   follow-up versus PET CT or histopathologic correlation is advised.      Assessment and Plan:  89y Female  with history of A-fib on Eliquis, HTN, seizures, GERD, anxiety, depression who presents to the ED for cough, cold, hypoxia. As per granddaughter, patient had developed leg edema/SOB 2 months ago,  Endocrinology is consulted Thyroid Nodule seen on imaging.     CHEST WALL AND LOWER NECK: There is a 1.6 cm partially calcified right   thyroid nodule..  TSH wnl 1.69  f/u free t4, t3    Recommendation:   given calcifications and size >1.5 cm, recommend to obtain thyroid u/s   Recommend to check TPO antibodies  Management of thyroid nodule in outpatient endocrine.

## 2025-01-27 NOTE — DISCHARGE NOTE PROVIDER - HOSPITAL COURSE
89-year-old female, AAOx2-3,  with history of A-fib on Eliquis, HTN, seizures, GERD, anxiety, depression who presents to the ED for cough, cold, hypoxia. In ED, imaging noted with cardiomegaly and bilateral small to moderate-sized pleural effusion. Groundglass and solid density 2.2 cm nodule to right apex, with a solid component measuring 0.9 cm. Flu A +. Started on 2 L NC for hypoxia. Adm to medicine for AHRF 2/2 Flu A vs PNA.          89-year-old female, AAOx2-3,  with history of A-fib on Eliquis, HTN, seizures, GERD, anxiety, depression who presents to the ED for cough, cold, hypoxia. In ED, imaging noted with cardiomegaly and bilateral small to moderate-sized pleural effusion. Groundglass and solid density 2.2 cm nodule to right apex, with a solid component measuring 0.9 cm. Flu A +. Started on 2 L NC for hypoxia and titrated to RA and tolerated.    Adm to medicine for AHRF 2/2 Flu A vs PNA.     Pt completed course of Azithromycin, Ceftriaxone & Tamiflu.      Pt medically stable for dc.  Awaiting dc home Fri 1/31.     Per Attending FT4 and Thyroxidase ordered for thyroid nodule evaluation.  Pt to f/u out patient for Thyroid US.           89-year-old female, AAOx2-3,  with history of A-fib on Eliquis, HTN, seizures, GERD, anxiety, depression who presents to the ED for cough, cold, hypoxia. In ED, imaging noted with cardiomegaly and bilateral small to moderate-sized pleural effusion. Groundglass and solid density 2.2 cm nodule to right apex, with a solid component measuring 0.9 cm. Flu A +. Started on 2 L NC for hypoxia and titrated to RA and tolerated.    Adm to medicine for AHRF 2/2 Flu A vs PNA.     Pt completed course of Azithromycin, Ceftriaxone & Tamiflu.     ---Echo showed mild (grade 1) left ventricular diastolic dysfunction, dilated right atrium, moderately dilated left atrium, moderate mitral regurgitation, moderate tricuspid regurgitation, mild aortic, mild pulmonic regurgitation, regurgitation, moderate aortic stenosis & Left pleural.  Evaluated by cardiology. started on Digoxin 125mg daily.   --- CT Chest showed ground glass and solid density 2.2 cm nodule the right apex, with a solid   component measuring 0.9 cm. while this may be infectious in nature, a single part solid nodule with the solid component measuring greater than 6 mm should be considered suspicious for malignancy. Pt to f/u OP for repeat CT Chest in 3 month and PET CT.   --   Per Attending FT4 and Thyroxidase ordered for thyroid nodule evaluation.  Pt to f/u out patient for Thyroid US.    Pt medically stable for dc.  Crae discussed with attending.

## 2025-01-27 NOTE — DISCHARGE NOTE PROVIDER - CARE PROVIDERS DIRECT ADDRESSES
,DirectAddress_Unknown,[email or web address] ,DirectAddress_Unknown,[email or web address],DirectAddress_Unknown

## 2025-01-27 NOTE — CHART NOTE - NSCHARTNOTEFT_GEN_A_CORE
Patient is a 89y old  Female who presents with a chief complaint of AHRF 2/2 Flu vs PNA (26 Jan 2025 09:03)  Morning labs reviewed with hypokalemia K+ 3.0    Vital Signs Last 24 Hrs  T(C): 36.4 (26 Jan 2025 07:58), Max: 37 (25 Jan 2025 19:54)  T(F): 97.5 (26 Jan 2025 07:58), Max: 98.6 (25 Jan 2025 19:54)  HR: 77 (26 Jan 2025 07:58) (77 - 102)  BP: 133/92 (26 Jan 2025 07:58) (96/59 - 143/83)  BP(mean): 105 (26 Jan 2025 07:58) (105 - 105)  RR: 18 (26 Jan 2025 07:58) (18 - 18)  SpO2: 96% (26 Jan 2025 07:58) (96% - 96%) room air    PAST MEDICAL & SURGICAL HISTORY:  HTN (hypertension)  Epilepsy  Atrial flutter  Urinary incontinence  Anxiety  GERD (gastroesophageal reflux disease)      LABS:                        12.5   6.46  )-----------( 194      ( 26 Jan 2025 07:36 )             38.4     01-26    139  |  99  |  23[H]  ----------------------------<  129[H]  3.0[L]   |  31  |  0.79    Ca    8.9      26 Jan 2025 07:36  TPro  6.5  /  Alb  3.2[L]  /  TBili  0.5  /  DBili  x   /  AST  20  /  ALT  18  /  AlkPhos  101  01-26      ASSESSMENT and PLAN   Patient is a 89y old  Female who presents with a chief complaint of AHRF 2/2 Flu vs PNA (26 Jan 2025 09:03)  Morning labs reviewed with hypokalemia K+ 3.0  will replenish with potassium chloride   Powder 20 milliEquivalent(s) Oral two times a day  monitor BMP      Care Collaborated Discussed with Consultants/Other Providers [x] YES  [ ] NO
ED update  Pt hx AF(Eliquis), HTN, seizure on Vimpat, GERd, depression, Admitted to medicine for AHRF 2/2 Flu A concerning PNA.   Pulm and Cardiology following, Started Abt, Tamiflu. Duoneb, Solumedrol.     SUBJECTIVE: cough productive, pain when coughing, no other complaints.     OBJECTIVE:  PHYSICAL EXAM:  GENERAL: NAD, frail elderly female, cachectic  EYES: clear conjunctiva; EOMI  ENMT: Moist mucous membranes  NECK: Supple, No JVD, Normal thyroid  CHEST/LUNG: Rhonchi, wheezing b/l lung fields.   HEART: S1, S2, Regular rate and rhythm  ABDOMEN: Soft, Nontender, Nondistended; Bowel sounds present  NEURO: Alert & Oriented self and place  EXTREMITIES: No LE edema, no calf tenderness  LYMPH: No lymphadenopathy noted  SKIN: No rashes or lesions    Vital Signs Last 24 Hrs  T(C): 36.9 (23 Jan 2025 16:18), Max: 37 (22 Jan 2025 18:17)  T(F): 98.4 (23 Jan 2025 16:18), Max: 98.6 (22 Jan 2025 18:17)  HR: 109 (23 Jan 2025 16:18) (72 - 109)  BP: 179/99 (23 Jan 2025 16:18) (134/73 - 179/99)  BP(mean): --  RR: 22 (23 Jan 2025 16:18) (17 - 22)  SpO2: 93% (23 Jan 2025 16:18) (93% - 100%)    Parameters below as of 23 Jan 2025 16:18  Patient On (Oxygen Delivery Method): room air        LABS:                        10.8   3.28  )-----------( 75       ( 23 Jan 2025 05:20 )             34.4     01-23    140  |  105  |  13  ----------------------------<  95  3.6   |  28  |  0.58    Ca    8.5      23 Jan 2025 05:20    TPro  7.1  /  Alb  3.6  /  TBili  0.8  /  DBili  x   /  AST  27  /  ALT  19  /  AlkPhos  130[H]  01-22    IMGAGING:   < from: CT Chest No Cont (01.22.25 @ 21:21) >    IMPRESSION:  Cardiomegaly and bilateral small to moderate-sized pleural effusions.    Groundglass and solid density 2.2 cm nodule the right apex, with a solid   component measuring 0.9 cm. while this may be infectious in nature, a   single part solid nodule with the solid component measuring greater than   6 mm should be considered suspicious for malignancy. Short-term 3 month   follow-up versus PET CT or histopathologic correlation is advised.    Additional findings as above.    < end of copied text >          ASSESSMENT:  HPI:  89-year-old female, AAOx2-3,  with history of A-fib on Eliquis, HTN, seizures, GERD, anxiety, depression who presents to the ED for cough, cold, hypoxia. As per granddaughter, patient had developed leg edema/SOB 2 months ago. At that time, Chest x-ray showed pleural effusion.She was evaluated by cardiologist 3 weeks ago, given Lasix 20 mg for total 6 days with improvement. Patient now presents to ED with known Flu A positive sick contacts at home. Patient p/w clear sputum productive coughing, chest congestion, runny nose, for past 4 days, worse over the past 2 days with SOB and weakness. She denies chest pain, vomiting, loss of appetite. Patient went to urgent care and was diagnosed with flu A.  However, later on at home, granddaughter noticed patient's pulse ox dropped to upper 80s. In ED, imaging noted with cardiomegaly and bilateral small to moderate-sized pleural effusion. Groundglass and solid density 2.2 cm nodule to right apex, with a solid component measuring 0.9 cm. Flu A +. Started on 2 L NC for hypoxia. Adm to medicine for AHRF 2/2 Flu A vs PNA.  (23 Jan 2025 02:28)      PLAN:   AHRF  Flu A  PNA, procal normal  Pleural effusion  lung nodules on CT, suspicious for malignancy.   AFIB, HTN, SEIZURE, DEPRESSION    -Pulm consulted, c/w Duoneb, Solumedrol, Tamiflu, Mucinex.   -c/w Azithromycin, Ctx.  -f/u PNA w/u  -card dr. Mayfield following  -start Lasix IV 20mg BID, c/w BB, Eliquis.   -supplemental oxygen, Titrate as tolerated  -can have PET CT in 3mon to eval lung nodules  -f/u TB quantiferon in AM per pulm.   -CXR in few days.   -c/w Lacosamide, sertraline home med for depression, seizure.     GOC: Daughter is not interested in talking about CODE status this time. Wishes aggressive treatment.       --------MED REC completed and updated  Eliquis 2.5mg BID  Losartan 50mg QD  Amlodipine 5mg QD  Metoprolol 25mg BID  Sertraline 50mg QD  Lacosamide 100mg BID  Esomeprazole 40mg BID  Oxybutynin 10mg QD
NP informed by Pharmacist that pt completed Azithromycin and recommended to dc med.     NP informed by Pharmacist that pt completed only 4 d of Ceftriaxone and recommended to continue 1 more day.      Orders updated as recommended.

## 2025-01-27 NOTE — PROGRESS NOTE ADULT - SUBJECTIVE AND OBJECTIVE BOX
pt seen in icu [  ], reg med floor [  x ], bed [ x ], chair at bedside [   ]  She is awake, alert, laying in bed in NAD. ECHO done. Clinically improved    REVIEW OF SYSTEMS:    CONSTITUTIONAL: No weakness, fevers or chills  EYES/ENT: No visual changes;  No vertigo or throat pain   NECK: No pain or stiffness  RESPIRATORY: No cough, wheezing, hemoptysis; No shortness of breath  CARDIOVASCULAR: No chest pain or palpitations  GASTROINTESTINAL: No abdominal or epigastric pain. No nausea, vomiting, or hematemesis; No diarrhea or constipation. No melena or hematochezia.  GENITOURINARY: No dysuria, frequency or hematuria  NEUROLOGICAL: No numbness or weakness  SKIN: No itching, burning, rashes, or lesions   All other review of systems is negative unless indicated above.    Physical Exam    General: WN/WD NAD  Neurology: A&Ox3, nonfocal, DANIELS x 4  Respiratory: Rhonchi B/L  CV: RRR, S1S2, no murmurs, rubs or gallops  Abdominal: Soft, NT, ND +BS, Last BM  Extremities: No edema, + peripheral pulses      Allergies  No Known Drug Allergies  Pineapple (Short breath)  Kiwi (Swelling)      Health Issues  Pneumonia due to infectious organism    Intubate    HTN (hypertension)    Epilepsy    Atrial flutter    Urinary incontinence    Anxiety    GERD (gastroesophageal reflux disease)    No significant past surgical history        Vitals  T(F): 98.2 (01-27-25 @ 08:05), Max: 98.7 (01-26-25 @ 23:12)  HR: 87 (01-27-25 @ 08:05) (72 - 90)  BP: 111/57 (01-27-25 @ 08:05) (102/57 - 136/80)  RR: 18 (01-27-25 @ 08:05) (18 - 18)  SpO2: 96% (01-27-25 @ 08:05) (96% - 97%)  Wt(kg): --  CAPILLARY BLOOD GLUCOSE          Labs                          13.3   6.49  )-----------( 217      ( 27 Jan 2025 07:23 )             40.9       01-27    138  |  98  |  21[H]  ----------------------------<  109[H]  4.3   |  29  |  0.77    Ca    9.0      27 Jan 2025 07:23    TPro  6.4  /  Alb  2.9[L]  /  TBili  0.6  /  DBili  x   /  AST  18  /  ALT  20  /  AlkPhos  104  01-27            Radiology Results      Meds    MEDICATIONS  (STANDING):  albuterol/ipratropium for Nebulization 3 milliLiter(s) Nebulizer every 6 hours  apixaban 2.5 milliGRAM(s) Oral every 12 hours  azithromycin  IVPB 500 milliGRAM(s) IV Intermittent every 24 hours  digoxin     Tablet 125 MICROGram(s) Oral daily  furosemide   Injectable 20 milliGRAM(s) IV Push two times a day  guaiFENesin  milliGRAM(s) Oral every 12 hours  lacosamide 100 milliGRAM(s) Oral two times a day  metoprolol tartrate 25 milliGRAM(s) Oral every 8 hours  oseltamivir 30 milliGRAM(s) Oral two times a day  oseltamivir      oxybutynin XL 5 milliGRAM(s) Oral daily  pantoprazole    Tablet 40 milliGRAM(s) Oral before breakfast  sertraline 50 milliGRAM(s) Oral daily      MEDICATIONS  (PRN):  acetaminophen     Tablet .. 650 milliGRAM(s) Oral every 6 hours PRN Temp greater or equal to 38C (100.4F), Mild Pain (1 - 3)  benzonatate 100 milliGRAM(s) Oral every 8 hours PRN Cough

## 2025-01-27 NOTE — DISCHARGE NOTE PROVIDER - PROVIDER TOKENS
PROVIDER:[TOKEN:[[id number]],FOLLOWUP:[1 week]],PROVIDER:[TOKEN:[[id number]],FOLLOWUP:[1 week]],PROVIDER:[TOKEN:[[id number]],FOLLOWUP:[1 week]] PROVIDER:[TOKEN:[[id number]],FOLLOWUP:[1 week]],PROVIDER:[TOKEN:[[id number]],FOLLOWUP:[1 week]],PROVIDER:[TOKEN:[[id number]],FOLLOWUP:[1 week]],PROVIDER:[TOKEN:[[id number]],FOLLOWUP:[1 week]]

## 2025-01-27 NOTE — PHARMACOTHERAPY INTERVENTION NOTE - COMMENTS
Patient is on Day 4 of azithromycin and ceftriaxone. Ceftriaxone fell off.    Recommended discontinuing azithromycin and adding another dose of ceftriaxone to complete 5-day course of pneumonia.

## 2025-01-27 NOTE — DISCHARGE NOTE PROVIDER - NSDCMRMEDTOKEN_GEN_ALL_CORE_FT
amLODIPine 5 mg oral tablet: 1 tab(s) orally once a day  apixaban 2.5 mg oral tablet: 1 tab(s) orally every 12 hours  esomeprazole 40 mg oral delayed release capsule: 1 cap(s) orally once a day  lacosamide 100 mg oral tablet: 1 tab(s) orally 2 times a day  losartan 50 mg oral tablet: 1 tab(s) orally once a day  metoprolol tartrate 25 mg oral tablet: 1 tab(s) orally 2 times a day  oxyBUTYnin 10 mg/24 hr oral tablet, extended release: 1 tab(s) orally once a day  sertraline 50 mg oral tablet: 1 tab(s) orally once a day (at bedtime)   apixaban 2.5 mg oral tablet: 1 tab(s) orally every 12 hours  digoxin 125 mcg (0.125 mg) oral tablet: 1 tab(s) orally once a day  esomeprazole 40 mg oral delayed release capsule: 1 cap(s) orally once a day  lacosamide 100 mg oral tablet: 1 tab(s) orally 2 times a day  losartan 50 mg oral tablet: 1 tab(s) orally once a day  metoprolol tartrate 25 mg oral tablet: 1 tab(s) orally 2 times a day  oxyBUTYnin 10 mg/24 hr oral tablet, extended release: 1 tab(s) orally once a day  sertraline 50 mg oral tablet: 1 tab(s) orally once a day (at bedtime)

## 2025-01-27 NOTE — PROGRESS NOTE ADULT - SUBJECTIVE AND OBJECTIVE BOX
Date of Service 01-27-25 @ 10:45    CHIEF COMPLAINT:Patient is a 89y old  Female who presents with a chief complaint of AHRF 2/2 Flu vs PNA .Pt appears comfortable.    	  REVIEW OF SYSTEMS:  CONSTITUTIONAL: No fever, weight loss, or fatigue  EYES: No eye pain, visual disturbances, or discharge  ENT:  No difficulty hearing, tinnitus, vertigo; No sinus or throat pain  NECK: No pain or stiffness  RESPIRATORY: No cough, wheezing, chills or hemoptysis; No Shortness of Breath  CARDIOVASCULAR: No chest pain, palpitations, passing out, dizziness, or leg swelling  GASTROINTESTINAL: No abdominal or epigastric pain. No nausea, vomiting, or hematemesis; No diarrhea or constipation. No melena or hematochezia.  GENITOURINARY: No dysuria, frequency, hematuria, or incontinence  NEUROLOGICAL: No headaches, memory loss, loss of strength, numbness, or tremors  SKIN: No itching, burning, rashes, or lesions   LYMPH Nodes: No enlarged glands  ENDOCRINE: No heat or cold intolerance; No hair loss  MUSCULOSKELETAL: No joint pain or swelling; No muscle, back, or extremity pain  PSYCHIATRIC: No depression, anxiety, mood swings, or difficulty sleeping  HEME/LYMPH: No easy bruising, or bleeding gums  ALLERGY AND IMMUNOLOGIC: No hives or eczema	      PHYSICAL EXAM:  T(C): 36.8 (01-27-25 @ 08:05), Max: 37.1 (01-26-25 @ 23:12)  HR: 87 (01-27-25 @ 08:05) (72 - 90)  BP: 111/57 (01-27-25 @ 08:05) (102/57 - 136/80)  RR: 18 (01-27-25 @ 08:05) (18 - 18)  SpO2: 96% (01-27-25 @ 08:05) (96% - 97%)  Wt(kg): --  I&O's Summary    26 Jan 2025 07:01  -  27 Jan 2025 07:00  --------------------------------------------------------  IN: 200 mL / OUT: 1850 mL / NET: -1650 mL        Appearance: Normal	  HEENT:   Normal oral mucosa, PERRL, EOMI	  Lymphatic: No lymphadenopathy  Cardiovascular: Normal S1 S2, No JVD, No murmurs, No edema  Respiratory: B/L ronchi 	  Psychiatry: A & O x 3, Mood & affect appropriate  Gastrointestinal:  Soft, Non-tender, + BS	  Skin: No rashes, No ecchymoses, No cyanosis	  Neurologic: Non-focal  Extremities: Normal range of motion, No clubbing, cyanosis or edema  Vascular: Peripheral pulses palpable 2+ bilaterally    MEDICATIONS  (STANDING):  albuterol/ipratropium for Nebulization 3 milliLiter(s) Nebulizer every 6 hours  apixaban 2.5 milliGRAM(s) Oral every 12 hours  azithromycin  IVPB 500 milliGRAM(s) IV Intermittent every 24 hours  digoxin     Tablet 125 MICROGram(s) Oral daily  furosemide    Tablet 20 milliGRAM(s) Oral daily  guaiFENesin  milliGRAM(s) Oral every 12 hours  lacosamide 100 milliGRAM(s) Oral two times a day  metoprolol tartrate 25 milliGRAM(s) Oral every 8 hours  oseltamivir 30 milliGRAM(s) Oral two times a day  oseltamivir      oxybutynin XL 5 milliGRAM(s) Oral daily  pantoprazole    Tablet 40 milliGRAM(s) Oral before breakfast  sertraline 50 milliGRAM(s) Oral daily      TELEMETRY:afib upto 120's 	    	  	  LABS:	 	                        13.3   6.49  )-----------( 217      ( 27 Jan 2025 07:23 )             40.9     01-27    138  |  98  |  21[H]  ----------------------------<  109[H]  4.3   |  29  |  0.77    Ca    9.0      27 Jan 2025 07:23    TPro  6.4  /  Alb  2.9[L]  /  TBili  0.6  /  DBili  x   /  AST  18  /  ALT  20  /  AlkPhos  104  01-27    proBNP:   Lipid Profile: Cholesterol 105  LDL --  HDL 39  TG 65  Ldl calc 52  Ratio --    HgA1c:   TSH: Thyroid Stimulating Hormone, Serum: 1.69 uU/mL (01-25 @ 07:29)  Thyroid Stimulating Hormone, Serum: 1.58 uU/mL (01-24 @ 08:27)

## 2025-01-27 NOTE — DISCHARGE NOTE PROVIDER - NSDCCPCAREPLAN_GEN_ALL_CORE_FT
PRINCIPAL DISCHARGE DIAGNOSIS  Diagnosis: Acute hypoxic respiratory failure  Assessment and Plan of Treatment: You were treated for respiratory failure likely triggerred by your flu infection. Acute respiratory failure is a condition that happens when your lungs cannot get enough oxygen into your blood. Call your local emergency number (911 in the [place]) or have someone call if: You have more trouble catching your breath. You have stopped breathing.  Manage ARF: Do not smoke. Nicotine and other chemicals in cigarettes and cigars can cause lung damage and make your symptoms worse. Prevent the spread of germs. Wash your hands often with soap and water. Cover your mouth when you cough. Cough into a tissue or your shirtsleeve so you do not spread germs from your hands. If you are sick, stay away from others as much as possible. Ask about vaccines you may need. Vaccines can help prevent some lung infections. Examples include pneumonia, COVID-19. Get a flu vaccine every year as soon as it is recommended, usually in September or October.  Follow up with your doctor        SECONDARY DISCHARGE DIAGNOSES  Diagnosis: Influenza A  Assessment and Plan of Treatment: You tested positive for influenza A. Influenza (the flu) is an infection caused by the influenza virus. The virus spreads through direct contact with someone who has the flu.   Rest as much as you can to help you recover. Drink liquids as directed to help prevent dehydration. Wash your hands often.   Call your local emergency number (911 in the [place]) if:  You have trouble breathing, and your lips look purple or blue.  You have a seizure.  You were seen by a pulmonologist and started on Tamiflu. Please continue your Tamiflu to complete your 5 day course.   Please avoid sick contacts.  Continue with covering your cough and good hand washing practices.  Maintain adequate nutrition, hydration , rest, and activity as tolerated.   Follow-up with your primary care physician within 1 week. Call for appointment.      Diagnosis: PNA (pneumonia)  Assessment and Plan of Treatment: Pneumonia is a lung infection that can cause a fever, cough, and trouble breathing.  Continue all antibiotics as ordered until complete.  Nutrition is important, eat small frequent meals.  Get lots of rest and drink fluids.  Call your health care provider upon arrival home from hospital and make a follow up appointment for one week.  If your cough worsens, you develop fever greater than 101', you have shaking chills, a fast heartbeat, trouble breathing and/or feel your are breathing much faster than usual, call your healthcare provider.  Make sure you wash your hands frequently.      Diagnosis: Pleural effusion due to CHF (congestive heart failure)  Assessment and Plan of Treatment: You presented tp the hospital with complaints of shortness of breath. You had a ct scan which showed you have a pleural effusion. You were followed by a pulmonologist. on discharge it is reccommended that you ??????  Pleural effusion is fluid buildup in the space between the layers of the pleura.   Call your local emergency number (911 in the ) if: You find it very hard to breathe. You feel faint, or you cannot think clearly.  Seek care immediately if: Your breathing problems do not go away, or they get worse.  Medicines: You may need any of the following: Cardiac medicines may be needed if your pleural effusion is caused by heart failure  Take your medicine as directed.   Prevent another pleural effusion: Maintain a healthy lifestyle: Eat a variety of healthy foods. Do not smoke and do not allow others to smoke around you. Drink liquids as directed and rest as needed. Rest when you feel it is needed.  Follow up with your doctor      Diagnosis: Atrial fibrillation  Assessment and Plan of Treatment: Atrial fibrillation is the most common heart rhythm problem & has the risk of stroke & heart attack  It helps if you control your blood pressure, not drink more than 1-2 alcohol drinks per day, cut down on caffeine, getting treatment for over active thyroid gland, & getting exercise  Call your doctor if you feel your heart racing or beating unusually, chest tightness or pain, lightheaded, faint, shortness of breath especially with exercise  It is important to take your heart medication as prescribed  You may be on anticoagulation which is very important to take as directed - you may need blood work to monitor drug levels      Diagnosis: HTN (hypertension)  Assessment and Plan of Treatment: You have a history of high blood pressure. High blood pressure is a condition that puts you at risk for heart attack, stroke and kidney disease. Please continue to take your medications as prescribed. You can also help control your blood pressure by maintaining a healthy weight, eating a diet low in fat and rich in fruits and vegetables, reduce the amount of salt in your diet. Also, reduce alcohol and try to include some form of physical activity daily for at least 30 mins. Follow up with your medical doctor to establish long term blood pressure treatment goals.  Notify your doctor if you have any of the following symptoms:   Dizziness, Lightheadedness, Blurry vision, Headache, Chest pain, Shortness of breath      Diagnosis: Seizures  Assessment and Plan of Treatment: You have a history of seizures, please take your medications as prescribed. Follow- up routinely with your healthcare provider. A seizure is an episode of abnormal brain activity. A seizure can cause jerky muscle movements, loss of consciousness, or confusion.  Call your local emergency number (911 in the [place]) or have someone else call for any of the following:  Your seizure lasts longer than 5 minutes.  You have a second seizure within 24 hours of your first.  You have trouble breathing after a seizure.  You cannot be woken after your seizure.  You have more than 1 seizure before you are fully awake or aware.  You have a seizure in water.  Do not panic.  Instructions if you had a seizure:  Gently guide me to the floor or a soft surface.  Do not hold me down or put anything in my mouth.  Place me on my side to help prevent me from swallowing saliva or vomit.  First Aid: Seizures (ADULT)  Protect me from injury. Remove sharp or hard objects from the area surrounding me, or cushion my head.  Loosen the clothing around my head and neck.  Time how long my seizure lasts. Call 911 if my seizure lasts longer than 5 minutes or if I have a second seizure.  Stay with me until my seizure ends. Let me rest until I am fully awake.  Perform CPR if I stop breathing or you cannot feel my pulse.  Do not give me anything to eat or drink until I am fully awake.  Follow up with your doctor or neurologist.       PRINCIPAL DISCHARGE DIAGNOSIS  Diagnosis: Acute hypoxic respiratory failure  Assessment and Plan of Treatment: You were treated for respiratory failure likely triggerred by your flu infection. Acute respiratory failure is a condition that happens when your lungs cannot get enough oxygen into your blood. Call your local emergency number (911 in the [place]) or have someone call if: You have more trouble catching your breath. You have stopped breathing.  Manage ARF: Do not smoke. Nicotine and other chemicals in cigarettes and cigars can cause lung damage and make your symptoms worse. Prevent the spread of germs. Wash your hands often with soap and water. Cover your mouth when you cough. Cough into a tissue or your shirtsleeve so you do not spread germs from your hands. If you are sick, stay away from others as much as possible. Ask about vaccines you may need. Vaccines can help prevent some lung infections. Examples include pneumonia, COVID-19. Get a flu vaccine every year as soon as it is recommended, usually in September or October.  Follow up with your doctor within 1 week of discharge.         SECONDARY DISCHARGE DIAGNOSES  Diagnosis: PNA (pneumonia)  Assessment and Plan of Treatment: Pneumonia is a lung infection that can cause a fever, cough, and trouble breathing.  You completed all antibiotics inpatient.  Nutrition is important, eat small frequent meals.  Get lots of rest and drink fluids.  Call your health care provider upon arrival home from hospital and make a follow up appointment for one week.  If your cough worsens, you develop fever greater than 101', you have shaking chills, a fast heartbeat, trouble breathing and/or feel your are breathing much faster than usual, call your healthcare provider.  Make sure you wash your hands frequently.  Follow up with Pulmonary Dr Hernández 1 week upon discharge.       Diagnosis: Influenza A  Assessment and Plan of Treatment: You tested positive for influenza A. Influenza (the flu) is an infection caused by the influenza virus. The virus spreads through direct contact with someone who has the flu.   Rest as much as you can to help you recover. Drink liquids as directed to help prevent dehydration. Wash your hands often.   Call your local emergency number (871 in the [place]) if:  You have trouble breathing, and your lips look purple or blue.  You have a seizure.  You were seen by a pulmonologist and started on Tamiflu. Please continue your Tamiflu to complete your 5 day course.   Please avoid sick contacts.  Continue with covering your cough and good hand washing practices.  Maintain adequate nutrition, hydration , rest, and activity as tolerated.   Follow-up with your primary care physician within 1 week. Call for appointment.      Diagnosis: Atrial fibrillation  Assessment and Plan of Treatment: Atrial fibrillation is the most common heart rhythm problem & has the risk of stroke & heart attack  It helps if you control your blood pressure, not drink more than 1-2 alcohol drinks per day, cut down on caffeine, getting treatment for over active thyroid gland, & getting exercise  Call your doctor if you feel your heart racing or beating unusually, chest tightness or pain, lightheaded, faint, shortness of breath especially with exercise  It is important to take your heart medication as prescribed  You may be on anticoagulation which is very important to take as directed - you may need blood work to monitor drug levels      Diagnosis: HTN (hypertension)  Assessment and Plan of Treatment: You have a history of high blood pressure. High blood pressure is a condition that puts you at risk for heart attack, stroke and kidney disease. Please continue to take your medications as prescribed. You can also help control your blood pressure by maintaining a healthy weight, eating a diet low in fat and rich in fruits and vegetables, reduce the amount of salt in your diet. Also, reduce alcohol and try to include some form of physical activity daily for at least 30 mins. Follow up with your medical doctor to establish long term blood pressure treatment goals.  Notify your doctor if you have any of the following symptoms:   Dizziness, Lightheadedness, Blurry vision, Headache, Chest pain, Shortness of breath      Diagnosis: Seizures  Assessment and Plan of Treatment: You have a history of seizures, please take your medications as prescribed. Follow- up routinely with your healthcare provider. A seizure is an episode of abnormal brain activity. A seizure can cause jerky muscle movements, loss of consciousness, or confusion.  Call your local emergency number (911 in the [place]) or have someone else call for any of the following:  Your seizure lasts longer than 5 minutes.  You have a second seizure within 24 hours of your first.  You have trouble breathing after a seizure.  You cannot be woken after your seizure.  You have more than 1 seizure before you are fully awake or aware.  You have a seizure in water.  Do not panic.  Instructions if you had a seizure:  Gently guide me to the floor or a soft surface.  Do not hold me down or put anything in my mouth.  Place me on my side to help prevent me from swallowing saliva or vomit.  First Aid: Seizures (ADULT)  Protect me from injury. Remove sharp or hard objects from the area surrounding me, or cushion my head.  Loosen the clothing around my head and neck.  Time how long my seizure lasts. Call 911 if my seizure lasts longer than 5 minutes or if I have a second seizure.  Stay with me until my seizure ends. Let me rest until I am fully awake.  Perform CPR if I stop breathing or you cannot feel my pulse.  Do not give me anything to eat or drink until I am fully awake.  Follow up with your doctor or neurologist.      Diagnosis: Pleural effusion due to CHF (congestive heart failure)  Assessment and Plan of Treatment: You presented to the hospital with complaints of shortness of breath. You had a ct scan which showed you have a pleural effusion. You were followed by a pulmonologist. on discharge it is reccommended that you follow up with him Dr. Hernández 1 week upon discharge for continued managment.   Pleural effusion is fluid buildup in the space between the layers of the pleura.   Call your local emergency number (911 in the [place]) if: You find it very hard to breathe. You feel faint, or you cannot think clearly.  Seek care immediately if: Your breathing problems do not go away, or they get worse.  Medicines: You may need any of the following: Cardiac medicines may be needed if your pleural effusion is caused by heart failure  Take your medicine as directed.   Prevent another pleural effusion: Maintain a healthy lifestyle: Eat a variety of healthy foods. Do not smoke and do not allow others to smoke around you. Drink liquids as directed and rest as needed. Rest when you feel it is needed.      Diagnosis: Lung nodule  Assessment and Plan of Treatment: Your  CaTscan of your Chest showed ground glass and solid density 2.2 cm nodule the right apex, with a solid component measuring 0.9 cm. while this may be infectious in nature, a single part solid nodule with the solid component measuring greater than 6 mm should be considered suspicious for malignancy.   - You must follow up outpatient for a  repeat CT Chest in 3 month or  PET CT   Follow up with your PCP or Pulmonologist within 1 week of discharge to plan for outpt repeat CT and PET scan.    Diagnosis: Thyroid nodule  Assessment and Plan of Treatment: There is a 1.6 cm partially calcified right thyroid nodule noted on your imaging.  Your TSH wnl 1.69  You were evaluated and followed by Endocrine impatient with Recommendations as follows:   Given calcifications and size >1.5 cm, You will need to obtain a thyroid ultrasound and to check TPO antibodies outpatient. You will need to call for your endocine appoinment within 1 week of discharge with  for Management of thyroid nodule in outpatient endocrine.      Diagnosis: Gait instability  Assessment and Plan of Treatment: You presented with gait instablility and were evaluated by physical therapy whom reccommened you go to rehab. Your family declined services and you were discharged home.   Create a safe environment. Falls precautions, free from clutter, remove throw rugs. Insure adequate lighting. Install grab rails as needed. Obtain life line, use baby monitors. Provide 24hr /7 day per week supervision Encourage good nutrition and hydration. Reduce

## 2025-01-27 NOTE — CONSULT NOTE ADULT - ATTENDING COMMENTS
I agree with the resident progress note/outlined plan of care. I have the edited the above note as needed. My independent findings and conclusions are documented.    patient does not know about thyroid nodule. Not a good historian.  Physical examination is negative for Goiter    Check Thyroid ultrasound and TPO antibodies.

## 2025-01-28 LAB
ANION GAP SERPL CALC-SCNC: 9 MMOL/L — SIGNIFICANT CHANGE UP (ref 5–17)
BUN SERPL-MCNC: 20 MG/DL — HIGH (ref 7–18)
CALCIUM SERPL-MCNC: 9.3 MG/DL — SIGNIFICANT CHANGE UP (ref 8.4–10.5)
CHLORIDE SERPL-SCNC: 103 MMOL/L — SIGNIFICANT CHANGE UP (ref 96–108)
CO2 SERPL-SCNC: 28 MMOL/L — SIGNIFICANT CHANGE UP (ref 22–31)
CREAT SERPL-MCNC: 0.66 MG/DL — SIGNIFICANT CHANGE UP (ref 0.5–1.3)
CULTURE RESULTS: SIGNIFICANT CHANGE UP
EGFR: 80 ML/MIN/1.73M2 — SIGNIFICANT CHANGE UP
GLUCOSE SERPL-MCNC: 117 MG/DL — HIGH (ref 70–99)
HCT VFR BLD CALC: 42.5 % — SIGNIFICANT CHANGE UP (ref 34.5–45)
HGB BLD-MCNC: 13.6 G/DL — SIGNIFICANT CHANGE UP (ref 11.5–15.5)
MAGNESIUM SERPL-MCNC: 2 MG/DL — SIGNIFICANT CHANGE UP (ref 1.6–2.6)
MCHC RBC-ENTMCNC: 27.5 PG — SIGNIFICANT CHANGE UP (ref 27–34)
MCHC RBC-ENTMCNC: 32 G/DL — SIGNIFICANT CHANGE UP (ref 32–36)
MCV RBC AUTO: 85.9 FL — SIGNIFICANT CHANGE UP (ref 80–100)
NRBC # BLD: 0 /100 WBCS — SIGNIFICANT CHANGE UP (ref 0–0)
NRBC BLD-RTO: 0 /100 WBCS — SIGNIFICANT CHANGE UP (ref 0–0)
PHOSPHATE SERPL-MCNC: 3.5 MG/DL — SIGNIFICANT CHANGE UP (ref 2.5–4.5)
PLATELET # BLD AUTO: 250 K/UL — SIGNIFICANT CHANGE UP (ref 150–400)
POTASSIUM SERPL-MCNC: 4.1 MMOL/L — SIGNIFICANT CHANGE UP (ref 3.5–5.3)
POTASSIUM SERPL-SCNC: 4.1 MMOL/L — SIGNIFICANT CHANGE UP (ref 3.5–5.3)
RBC # BLD: 4.95 M/UL — SIGNIFICANT CHANGE UP (ref 3.8–5.2)
RBC # FLD: 14.3 % — SIGNIFICANT CHANGE UP (ref 10.3–14.5)
SODIUM SERPL-SCNC: 140 MMOL/L — SIGNIFICANT CHANGE UP (ref 135–145)
SPECIMEN SOURCE: SIGNIFICANT CHANGE UP
WBC # BLD: 5.86 K/UL — SIGNIFICANT CHANGE UP (ref 3.8–10.5)
WBC # FLD AUTO: 5.86 K/UL — SIGNIFICANT CHANGE UP (ref 3.8–10.5)

## 2025-01-28 PROCEDURE — 99231 SBSQ HOSP IP/OBS SF/LOW 25: CPT

## 2025-01-28 RX ADMIN — Medication 50 MILLIGRAM(S): at 11:07

## 2025-01-28 RX ADMIN — APIXABAN 2.5 MILLIGRAM(S): 5 TABLET, FILM COATED ORAL at 05:05

## 2025-01-28 RX ADMIN — APIXABAN 2.5 MILLIGRAM(S): 5 TABLET, FILM COATED ORAL at 17:50

## 2025-01-28 RX ADMIN — Medication 600 MILLIGRAM(S): at 05:06

## 2025-01-28 RX ADMIN — Medication 25 MILLIGRAM(S): at 14:21

## 2025-01-28 RX ADMIN — Medication 20 MILLIGRAM(S): at 05:06

## 2025-01-28 RX ADMIN — Medication 125 MICROGRAM(S): at 05:05

## 2025-01-28 RX ADMIN — Medication 25 MILLIGRAM(S): at 21:59

## 2025-01-28 RX ADMIN — OSELTAMIVIR PHOSPHATE 30 MILLIGRAM(S): 75 CAPSULE ORAL at 06:33

## 2025-01-28 RX ADMIN — PANTOPRAZOLE 40 MILLIGRAM(S): 20 TABLET, DELAYED RELEASE ORAL at 05:09

## 2025-01-28 RX ADMIN — IPRATROPIUM BROMIDE AND ALBUTEROL SULFATE 3 MILLILITER(S): .5; 2.5 SOLUTION RESPIRATORY (INHALATION) at 14:19

## 2025-01-28 RX ADMIN — IPRATROPIUM BROMIDE AND ALBUTEROL SULFATE 3 MILLILITER(S): .5; 2.5 SOLUTION RESPIRATORY (INHALATION) at 20:20

## 2025-01-28 RX ADMIN — LACOSAMIDE 100 MILLIGRAM(S): 200 TABLET, FILM COATED ORAL at 05:04

## 2025-01-28 RX ADMIN — Medication 25 MILLIGRAM(S): at 05:07

## 2025-01-28 RX ADMIN — LACOSAMIDE 100 MILLIGRAM(S): 200 TABLET, FILM COATED ORAL at 17:50

## 2025-01-28 RX ADMIN — IPRATROPIUM BROMIDE AND ALBUTEROL SULFATE 3 MILLILITER(S): .5; 2.5 SOLUTION RESPIRATORY (INHALATION) at 08:22

## 2025-01-28 RX ADMIN — Medication 600 MILLIGRAM(S): at 17:50

## 2025-01-28 RX ADMIN — OXYBUTYNIN CHLORIDE 5 MILLIGRAM(S): 5 TABLET, EXTENDED RELEASE ORAL at 11:07

## 2025-01-28 NOTE — PROGRESS NOTE ADULT - SUBJECTIVE AND OBJECTIVE BOX
Subjective:  Chart Notes, Work list Manager, and fingersticks reviewed.    Review of Systems:  Constitutional: No fever  Eyes: No blurry vision  Neuro: No tremors  HEENT: No pain  Cardiovascular: No chest pain, palpitations  Respiratory: No SOB, no cough  GI: No nausea, vomiting, abdominal pain  : No dysuria  Skin: no rash  Psych: no depression  Endocrine: no polyuria, polydipsia  Hem/lymph: no swelling  Osteoporosis: no fractures    ALL OTHER SYSTEMS REVIEWED AND NEGATIVE    UNABLE TO OBTAIN    MEDICATIONS  (STANDING):  albuterol/ipratropium for Nebulization 3 milliLiter(s) Nebulizer every 6 hours  apixaban 2.5 milliGRAM(s) Oral every 12 hours  digoxin     Tablet 125 MICROGram(s) Oral daily  furosemide    Tablet 20 milliGRAM(s) Oral daily  guaiFENesin  milliGRAM(s) Oral every 12 hours  lacosamide 100 milliGRAM(s) Oral two times a day  metoprolol tartrate 25 milliGRAM(s) Oral every 8 hours  oseltamivir 30 milliGRAM(s) Oral two times a day  oseltamivir      oxybutynin XL 5 milliGRAM(s) Oral daily  pantoprazole    Tablet 40 milliGRAM(s) Oral before breakfast  sertraline 50 milliGRAM(s) Oral daily    MEDICATIONS  (PRN):  acetaminophen     Tablet .. 650 milliGRAM(s) Oral every 6 hours PRN Temp greater or equal to 38C (100.4F), Mild Pain (1 - 3)  benzonatate 100 milliGRAM(s) Oral every 8 hours PRN Cough      PHYSICAL EXAM:  VITALS: T(C): 36.2 (01-28-25 @ 11:33)  T(F): 97.2 (01-28-25 @ 11:33), Max: 98.6 (01-27-25 @ 19:51)  HR: 91 (01-28-25 @ 11:33) (75 - 103)  BP: 109/67 (01-28-25 @ 11:33) (103/73 - 139/62)  RR:  (18 - 18)  SpO2:  (96% - 98%)  Wt(kg): --  GENERAL: NAD,   HEENT:  Atraumatic, Normocephalic, drymucous membranes  THYROID: Normal size, no palpable nodules  RESPIRATORY: Clear to auscultation bilaterally; No rales, rhonchi, wheezing  CARDIOVASCULAR: Regular rate and rhythm; No murmurs; no peripheral edema  GI: Soft, nontender, non distended, +ve abdominal obesity  EXTREMITIES: +ve peripheral pulses, -ve pedal edema  SKIN: Dry, intact, No rashes or lesions  PSYCH: Alert and oriented x 3    CAPILLARY BLOOD GLUCOSE        01-28    140  |  103  |  20[H]  ----------------------------<  117[H]  4.1   |  28  |  0.66    eGFR: 80    Ca    9.3      01-28  Mg     2.0     01-28  Phos  3.5     01-28    TPro  6.4  /  Alb  2.9[L]  /  TBili  0.6  /  DBili  x   /  AST  18  /  ALT  20  /  AlkPhos  104  01-27    Thyroid Function Tests:  01-25 @ 07:29 TSH 1.69 FreeT4 -- T3 -- Anti TPO -- Anti Thyroglobulin Ab -- TSI --  01-24 @ 08:27 TSH 1.58 FreeT4 -- T3 -- Anti TPO -- Anti Thyroglobulin Ab -- TSI --        Assessment and Plan:    1) Type 2 diabetes:      Recommendations:  - Basal Insulin:   Glargine  (Lantus) units once daily    - Nutritional Insulin:  Lispro (Admelog) units with breakfast, hold if NPO or eating <50% of meals  Lispro (Admelog) units with lunch, hold if NPO or eating <50% of meals  Lispro (Admelog) units with dinner, hold if NPO or eating <50% of meals    - Correctional (Sliding) Insulin:  Low Lispro (Admelog) sliding scale with meals and bedtime    - Oral Medications:  None in the hospital               Subjective:  Chart Notes, Work list Manager, and fingersticks reviewed.    Review of Systems:  Constitutional: No fever  Eyes: No blurry vision  Neuro: No tremors  HEENT: No pain  Cardiovascular: No chest pain, palpitations  Respiratory: No SOB, no cough  GI: No nausea, vomiting, abdominal pain  : No dysuria  Skin: no rash  Psych: no depression  Endocrine: no polyuria, polydipsia  Hem/lymph: no swelling  Osteoporosis: no fractures    ALL OTHER SYSTEMS REVIEWED AND NEGATIVE    UNABLE TO OBTAIN    MEDICATIONS  (STANDING):  albuterol/ipratropium for Nebulization 3 milliLiter(s) Nebulizer every 6 hours  apixaban 2.5 milliGRAM(s) Oral every 12 hours  digoxin     Tablet 125 MICROGram(s) Oral daily  furosemide    Tablet 20 milliGRAM(s) Oral daily  guaiFENesin  milliGRAM(s) Oral every 12 hours  lacosamide 100 milliGRAM(s) Oral two times a day  metoprolol tartrate 25 milliGRAM(s) Oral every 8 hours  oseltamivir 30 milliGRAM(s) Oral two times a day  oseltamivir      oxybutynin XL 5 milliGRAM(s) Oral daily  pantoprazole    Tablet 40 milliGRAM(s) Oral before breakfast  sertraline 50 milliGRAM(s) Oral daily    MEDICATIONS  (PRN):  acetaminophen     Tablet .. 650 milliGRAM(s) Oral every 6 hours PRN Temp greater or equal to 38C (100.4F), Mild Pain (1 - 3)  benzonatate 100 milliGRAM(s) Oral every 8 hours PRN Cough      PHYSICAL EXAM:  VITALS: T(C): 36.2 (01-28-25 @ 11:33)  T(F): 97.2 (01-28-25 @ 11:33), Max: 98.6 (01-27-25 @ 19:51)  HR: 91 (01-28-25 @ 11:33) (75 - 103)  BP: 109/67 (01-28-25 @ 11:33) (103/73 - 139/62)  RR:  (18 - 18)  SpO2:  (96% - 98%)  Wt(kg): --  GENERAL: NAD,   HEENT:  Atraumatic, Normocephalic, drymucous membranes  THYROID: Normal size, no palpable nodules  RESPIRATORY: Clear to auscultation bilaterally; No rales, rhonchi, wheezing  CARDIOVASCULAR: Regular rate and rhythm; No murmurs; no peripheral edema  GI: Soft, nontender, non distended, +ve abdominal obesity  EXTREMITIES: +ve peripheral pulses, -ve pedal edema  SKIN: Dry, intact, No rashes or lesions  PSYCH: Alert and oriented x 1-2    CAPILLARY BLOOD GLUCOSE        01-28    140  |  103  |  20[H]  ----------------------------<  117[H]  4.1   |  28  |  0.66    eGFR: 80    Ca    9.3      01-28  Mg     2.0     01-28  Phos  3.5     01-28    TPro  6.4  /  Alb  2.9[L]  /  TBili  0.6  /  DBili  x   /  AST  18  /  ALT  20  /  AlkPhos  104  01-27    Thyroid Function Tests:  01-25 @ 07:29 TSH 1.69 FreeT4 -- T3 -- Anti TPO -- Anti Thyroglobulin Ab -- TSI --  01-24 @ 08:27 TSH 1.58 FreeT4 -- T3 -- Anti TPO -- Anti Thyroglobulin Ab -- TSI --      Assessment and Plan:  89y Female  with history of A-fib on Eliquis, HTN, seizures, GERD, anxiety, depression who presents to the ED for cough, cold, hypoxia. As per granddaughter, patient had developed leg edema/SOB 2 months ago,  Endocrinology is consulted Thyroid Nodule seen on imaging.     CHEST WALL AND LOWER NECK: There is a 1.6 cm partially calcified right   thyroid nodule..  TSH wnl 1.69  f/u free t4, t3    Recommendation:   given calcifications and size >1.5 cm, recommend to obtain thyroid u/s   Recommend to check TPO antibodies  Management of thyroid nodule in outpatient endocrine.    - Correctional (Sliding) Insulin:  Low Lispro (Admelog) sliding scale with meals and bedtime    - Oral Medications:  None in the hospital               Subjective:  Chart Notes, Work list Manager, and fingersticks reviewed.    Review of Systems:  Constitutional: No fever  Eyes: No blurry vision  Neuro: No tremors  HEENT: No pain  Cardiovascular: No chest pain, palpitations  Respiratory: No SOB, no cough  GI: No nausea, vomiting, abdominal pain  : No dysuria  Skin: no rash  Psych: no depression  Endocrine: no polyuria, polydipsia  Hem/lymph: no swelling  Osteoporosis: no fractures    ALL OTHER SYSTEMS REVIEWED AND NEGATIVE    UNABLE TO OBTAIN    MEDICATIONS  (STANDING):  albuterol/ipratropium for Nebulization 3 milliLiter(s) Nebulizer every 6 hours  apixaban 2.5 milliGRAM(s) Oral every 12 hours  digoxin     Tablet 125 MICROGram(s) Oral daily  furosemide    Tablet 20 milliGRAM(s) Oral daily  guaiFENesin  milliGRAM(s) Oral every 12 hours  lacosamide 100 milliGRAM(s) Oral two times a day  metoprolol tartrate 25 milliGRAM(s) Oral every 8 hours  oseltamivir 30 milliGRAM(s) Oral two times a day  oseltamivir      oxybutynin XL 5 milliGRAM(s) Oral daily  pantoprazole    Tablet 40 milliGRAM(s) Oral before breakfast  sertraline 50 milliGRAM(s) Oral daily    MEDICATIONS  (PRN):  acetaminophen     Tablet .. 650 milliGRAM(s) Oral every 6 hours PRN Temp greater or equal to 38C (100.4F), Mild Pain (1 - 3)  benzonatate 100 milliGRAM(s) Oral every 8 hours PRN Cough      PHYSICAL EXAM:  VITALS: T(C): 36.2 (01-28-25 @ 11:33)  T(F): 97.2 (01-28-25 @ 11:33), Max: 98.6 (01-27-25 @ 19:51)  HR: 91 (01-28-25 @ 11:33) (75 - 103)  BP: 109/67 (01-28-25 @ 11:33) (103/73 - 139/62)  RR:  (18 - 18)  SpO2:  (96% - 98%)  Wt(kg): --  GENERAL: NAD,   HEENT:  Atraumatic, Normocephalic, drymucous membranes  THYROID: Normal size, no palpable nodules  RESPIRATORY: Clear to auscultation bilaterally; No rales, rhonchi, wheezing  CARDIOVASCULAR: Regular rate and rhythm; No murmurs; no peripheral edema  GI: Soft, nontender, non distended, +ve abdominal obesity  EXTREMITIES: +ve peripheral pulses, -ve pedal edema  SKIN: Dry, intact, No rashes or lesions  PSYCH: Alert and oriented x 1-2    CAPILLARY BLOOD GLUCOSE        01-28    140  |  103  |  20[H]  ----------------------------<  117[H]  4.1   |  28  |  0.66    eGFR: 80    Ca    9.3      01-28  Mg     2.0     01-28  Phos  3.5     01-28    TPro  6.4  /  Alb  2.9[L]  /  TBili  0.6  /  DBili  x   /  AST  18  /  ALT  20  /  AlkPhos  104  01-27    Thyroid Function Tests:  01-25 @ 07:29 TSH 1.69 FreeT4 -- T3 -- Anti TPO -- Anti Thyroglobulin Ab -- TSI --  01-24 @ 08:27 TSH 1.58 FreeT4 -- T3 -- Anti TPO -- Anti Thyroglobulin Ab -- TSI --      Assessment and Plan:  89y Female  with history of A-fib on Eliquis, HTN, seizures, GERD, anxiety, depression who presents to the ED for cough, cold, hypoxia. As per granddaughter, patient had developed leg edema/SOB 2 months ago,  Endocrinology is consulted Thyroid Nodule seen on imaging.     CHEST WALL AND LOWER NECK: There is a 1.6 cm partially calcified right   thyroid nodule..  TSH wnl 1.69  f/u free t4, t3    Recommendation:   given calcifications and size >1.5 cm, recommend to obtain thyroid u/s   Recommend to check TPO antibodies  Management of thyroid nodule in outpatient endocrine.                   Subjective:  Chart Notes, Work list Manager, and fingersticks reviewed. Patient not a good historian    Review of Systems:  Constitutional: No fever  Eyes: No blurry vision  Cardiovascular: No chest pain, palpitations  Respiratory: No SOB, no cough  GI: No nausea, vomiting, abdominal pain  Endocrine: no polyuria, polydipsia    Medications  (standing):  albuterol/ipratropium for Nebulization 3 milliLiter(s) Nebulizer every 6 hours  apixaban 2.5 milliGRAM(s) Oral every 12 hours  digoxin     Tablet 125 MICROGram(s) Oral daily  furosemide    Tablet 20 milliGRAM(s) Oral daily  guaiFENesin  milliGRAM(s) Oral every 12 hours  lacosamide 100 milliGRAM(s) Oral two times a day  metoprolol tartrate 25 milliGRAM(s) Oral every 8 hours  oseltamivir 30 milliGRAM(s) Oral two times a day  oseltamivir      oxybutynin XL 5 milliGRAM(s) Oral daily  pantoprazole    Tablet 40 milliGRAM(s) Oral before breakfast  sertraline 50 milliGRAM(s) Oral daily    Medications  (PRN):  acetaminophen     Tablet .. 650 milliGRAM(s) Oral every 6 hours PRN Temp greater or equal to 38C (100.4F), Mild Pain (1 - 3)  benzonatate 100 milliGRAM(s) Oral every 8 hours PRN Cough    Physical examination:  VITALS: T(C): 36.2 (01-28-25 @ 11:33)  T(F): 97.2 (01-28-25 @ 11:33), Max: 98.6 (01-27-25 @ 19:51)  HR: 91 (01-28-25 @ 11:33) (75 - 103)  BP: 109/67 (01-28-25 @ 11:33) (103/73 - 139/62)  RR:  (18 - 18)  SpO2:  (96% - 98%)    GENERAL: NAD,   HEENT:  Dry mucous membranes  THYROID: Normal size, no palpable nodules  GI: Soft, nontender, non distended, +ve abdominal obesity  EXTREMITIES: +ve peripheral pulses, -ve pedal edema  SKIN: Dry, intact, No rashes or lesions  PSYCH: Alert and oriented x 1-2    Labs:    01-28  140  |  103  |  20[H]  ----------------------------<  117[H]  4.1   |  28  |  0.66  eGFR: 80  Ca    9.3      01-28  Mg     2.0     01-28  Phos  3.5     01-28  Thyroid Function Tests:  01-25 @ 07:29 TSH 1.69   01-24 @ 08:27 TSH 1.58    CT scan PROCEDURE DATE:  02/28/2023      TECHNIQUE:  Axial CT images were acquired through the head and cervical spine.  Intravenous contrast: None  Sagittal and coronal reformats were generated.  3-D reformats of the cervical spine.    COMPARISON STUDY: None    FINDINGS:  CT head:  There is no mass effect, intracranial hemorrhage, or midline shift.    There is no CT evidence of acute territorial infarct.    The ventricles and sulci are appropriate in size and configuration for   patient's stated age.    The imaged portions of the paranasal sinuses and mastoids are well   aerated.    9 mm nodule in the soft tissues of the right posterior parietal region   (3:39) may represent a small hematoma in the setting of right-sided head   trauma versus a chronic finding such as a small sebaceous cyst.    There is no osseous abnormality.  Bilateral cataract surgery.    CT cervical spine:  There is no evidence of fracture or subluxation.  The vertebral bodies   are normally aligned.  The prevertebral soft tissues are unremarkable.    There are multilevel degenerative changes characterized by disc   osteophyte complexes and facet and uncinate hypertrophy. This results in   mild canal stenosis and multilevel neural foraminal narrowing.    The visualized intracranial structures are unremarkable.  The soft   tissues of the neck are grossly normal.  The lung apices are clear.  Thyroid nodules including a peripherally calcified 1.6 cm right lobe   nodule.    Assessment and Plan:  89y Female  with history of A-fib on Eliquis, HTN, seizures, GERD, anxiety, depression who presents to the ED for cough, cold, hypoxia. As per granddaughter, patient had developed leg edema/SOB 2 months ago,  Endocrinology is following Thyroid Nodule seen on imaging.     CHEST WALL AND LOWER NECK: There is a 1.6 cm partially calcified right   thyroid nodule..  TSH wnl 1.69  f/u free t4, t3    Recommendation:   given calcifications and size >1.5 cm, recommend to obtain thyroid u/s   Recommend to check TPO antibodies  Management of thyroid nodule in outpatient endocrine.

## 2025-01-28 NOTE — PROGRESS NOTE ADULT - SUBJECTIVE AND OBJECTIVE BOX
Date of Service 01-28-25 @ 10:33    CHIEF COMPLAINT:Patient is a 89y old  Female who presents with a chief complaint of AHRF 2/2 Flu vs PNA .Pt appears comfortable.    	  REVIEW OF SYSTEMS:  CONSTITUTIONAL: No fever, weight loss, or fatigue  EYES: No eye pain, visual disturbances, or discharge  ENT:  No difficulty hearing, tinnitus, vertigo; No sinus or throat pain  NECK: No pain or stiffness  RESPIRATORY: No cough, wheezing, chills or hemoptysis; No Shortness of Breath  CARDIOVASCULAR: No chest pain, palpitations, passing out, dizziness, or leg swelling  GASTROINTESTINAL: No abdominal or epigastric pain. No nausea, vomiting, or hematemesis; No diarrhea or constipation. No melena or hematochezia.  GENITOURINARY: No dysuria, frequency, hematuria, or incontinence  NEUROLOGICAL: No headaches, memory loss, loss of strength, numbness, or tremors  SKIN: No itching, burning, rashes, or lesions   LYMPH Nodes: No enlarged glands  ENDOCRINE: No heat or cold intolerance; No hair loss  MUSCULOSKELETAL: No joint pain or swelling; No muscle, back, or extremity pain  PSYCHIATRIC: No depression, anxiety, mood swings, or difficulty sleeping  HEME/LYMPH: No easy bruising, or bleeding gums  ALLERGY AND IMMUNOLOGIC: No hives or eczema	        PHYSICAL EXAM:  T(C): 36.3 (01-28-25 @ 08:19), Max: 37.1 (01-27-25 @ 11:39)  HR: 80 (01-28-25 @ 08:19) (75 - 103)  BP: 126/72 (01-28-25 @ 08:19) (103/56 - 139/62)  RR: 18 (01-28-25 @ 08:19) (18 - 18)  SpO2: 97% (01-28-25 @ 08:19) (95% - 98%)  Wt(kg): --  I&O's Summary    27 Jan 2025 07:01  -  28 Jan 2025 07:00  --------------------------------------------------------  IN: 0 mL / OUT: 600 mL / NET: -600 mL        Appearance: Normal	  HEENT:   Normal oral mucosa, PERRL, EOMI	  Lymphatic: No lymphadenopathy  Cardiovascular: Normal S1 S2, No JVD, No murmurs, No edema  Respiratory: Lungs clear to auscultation	  Psychiatry: A & O x 3, Mood & affect appropriate  Gastrointestinal:  Soft, Non-tender, + BS	  Skin: No rashes, No ecchymoses, No cyanosis	  Neurologic: Non-focal  Extremities: Normal range of motion, No clubbing, cyanosis or edema  Vascular: Peripheral pulses palpable 2+ bilaterally    MEDICATIONS  (STANDING):  albuterol/ipratropium for Nebulization 3 milliLiter(s) Nebulizer every 6 hours  apixaban 2.5 milliGRAM(s) Oral every 12 hours  digoxin     Tablet 125 MICROGram(s) Oral daily  furosemide    Tablet 20 milliGRAM(s) Oral daily  guaiFENesin  milliGRAM(s) Oral every 12 hours  lacosamide 100 milliGRAM(s) Oral two times a day  metoprolol tartrate 25 milliGRAM(s) Oral every 8 hours  oseltamivir 30 milliGRAM(s) Oral two times a day  oseltamivir      oxybutynin XL 5 milliGRAM(s) Oral daily  pantoprazole    Tablet 40 milliGRAM(s) Oral before breakfast  sertraline 50 milliGRAM(s) Oral daily        LABS:	 	                          13.6   5.86  )-----------( 250      ( 28 Jan 2025 07:36 )             42.5     01-28    140  |  103  |  20[H]  ----------------------------<  117[H]  4.1   |  28  |  0.66    Ca    9.3      28 Jan 2025 07:36  Phos  3.5     01-28  Mg     2.0     01-28    TPro  6.4  /  Alb  2.9[L]  /  TBili  0.6  /  DBili  x   /  AST  18  /  ALT  20  /  AlkPhos  104  01-27    proBNP:   Lipid Profile: Cholesterol 105  LDL --  HDL 39  TG 65  Ldl calc 52  Ratio --    HgA1c:   TSH: Thyroid Stimulating Hormone, Serum: 1.69 uU/mL (01-25 @ 07:29)  Thyroid Stimulating Hormone, Serum: 1.58 uU/mL (01-24 @ 08:27)

## 2025-01-28 NOTE — PROGRESS NOTE ADULT - SUBJECTIVE AND OBJECTIVE BOX
NP Note discussed with  primary attending    Patient is a 89y old  Female who presents with a chief complaint of AHRF 2/2 Flu vs PNA (28 Jan 2025 14:37)      INTERVAL HPI/OVERNIGHT EVENTS: no new complaints    MEDICATIONS  (STANDING):  albuterol/ipratropium for Nebulization 3 milliLiter(s) Nebulizer every 6 hours  apixaban 2.5 milliGRAM(s) Oral every 12 hours  digoxin     Tablet 125 MICROGram(s) Oral daily  furosemide    Tablet 20 milliGRAM(s) Oral daily  guaiFENesin  milliGRAM(s) Oral every 12 hours  lacosamide 100 milliGRAM(s) Oral two times a day  metoprolol tartrate 25 milliGRAM(s) Oral every 8 hours  oseltamivir 30 milliGRAM(s) Oral two times a day  oseltamivir      oxybutynin XL 5 milliGRAM(s) Oral daily  pantoprazole    Tablet 40 milliGRAM(s) Oral before breakfast  sertraline 50 milliGRAM(s) Oral daily    MEDICATIONS  (PRN):  acetaminophen     Tablet .. 650 milliGRAM(s) Oral every 6 hours PRN Temp greater or equal to 38C (100.4F), Mild Pain (1 - 3)  benzonatate 100 milliGRAM(s) Oral every 8 hours PRN Cough      __________________________________________________  REVIEW OF SYSTEMS:    CONSTITUTIONAL: No fever  EYES: No acute visual disturbances  NECK: No pain or stiffness  RESPIRATORY: No cough; No shortness of breath  CARDIOVASCULAR: No chest pain, no palpitations  GASTROINTESTINAL: No pain. No nausea or vomiting.  No diarrhea   NEUROLOGICAL: No headache or numbness, no tremors  MUSCULOSKELETAL: No joint pain, no muscle pain  GENITOURINARY: No dysuria, no frequency, no hesitancy  PSYCHIATRY: No depression , no anxiety  ALL OTHER  ROS negative        Vital Signs Last 24 Hrs  T(C): 36.2 (28 Jan 2025 11:33), Max: 37 (27 Jan 2025 19:51)  T(F): 97.2 (28 Jan 2025 11:33), Max: 98.6 (27 Jan 2025 19:51)  HR: 91 (28 Jan 2025 11:33) (75 - 103)  BP: 109/67 (28 Jan 2025 11:33) (103/73 - 139/62)  BP(mean): --  RR: 18 (28 Jan 2025 11:33) (18 - 18)  SpO2: 97% (28 Jan 2025 11:33) (96% - 98%)    Parameters below as of 28 Jan 2025 11:33  Patient On (Oxygen Delivery Method): room air        ________________________________________________  PHYSICAL EXAM:  GENERAL: NAD  HEENT: Normocephalic;  conjunctivae and sclerae clear; moist mucous membranes   NECK : Supple  CHEST/LUNG: Clear to auscultation bilaterally with good air entry   HEART: S1 S2  regular; no murmurs, gallops or rubs  ABDOMEN: Soft, Nontender, Nondistended; Bowel sounds present x 4 quad  EXTREMITIES: No cyanosis; no edema; no calf tenderness  SKIN: Warm and dry; no rash  NERVOUS SYSTEM:  Awake and alert; Oriented to place, person and time; no new deficits    _________________________________________________  LABS:                        13.6   5.86  )-----------( 250      ( 28 Jan 2025 07:36 )             42.5     01-28    140  |  103  |  20[H]  ----------------------------<  117[H]  4.1   |  28  |  0.66    Ca    9.3      28 Jan 2025 07:36  Phos  3.5     01-28  Mg     2.0     01-28    TPro  6.4  /  Alb  2.9[L]  /  TBili  0.6  /  DBili  x   /  AST  18  /  ALT  20  /  AlkPhos  104  01-27      Urinalysis Basic - ( 28 Jan 2025 07:36 )    Color: x / Appearance: x / SG: x / pH: x  Gluc: 117 mg/dL / Ketone: x  / Bili: x / Urobili: x   Blood: x / Protein: x / Nitrite: x   Leuk Esterase: x / RBC: x / WBC x   Sq Epi: x / Non Sq Epi: x / Bacteria: x      CAPILLARY BLOOD GLUCOSE            RADIOLOGY & ADDITIONAL TESTS:    Imaging Personally Reviewed:  YES/NO    Consultant(s) Notes Reviewed:   YES/ No    Care Discussed with Consultants :     Plan of care was discussed with patient and /or primary care giver; all questions and concerns were addressed and care was aligned with patient's wishes.     NP Note discussed with  primary attending    Patient is a 89y old  Female who presents with a chief complaint of AHRF 2/2 Flu vs PNA (28 Jan 2025 14:37)      INTERVAL HPI/OVERNIGHT EVENTS: No new concerns or complaints.  Found in RA.  -Nataliya # [id number].         MEDICATIONS  (STANDING):  albuterol/ipratropium for Nebulization 3 milliLiter(s) Nebulizer every 6 hours  apixaban 2.5 milliGRAM(s) Oral every 12 hours  digoxin     Tablet 125 MICROGram(s) Oral daily  furosemide    Tablet 20 milliGRAM(s) Oral daily  guaiFENesin  milliGRAM(s) Oral every 12 hours  lacosamide 100 milliGRAM(s) Oral two times a day  metoprolol tartrate 25 milliGRAM(s) Oral every 8 hours  oseltamivir 30 milliGRAM(s) Oral two times a day  oseltamivir      oxybutynin XL 5 milliGRAM(s) Oral daily  pantoprazole    Tablet 40 milliGRAM(s) Oral before breakfast  sertraline 50 milliGRAM(s) Oral daily    MEDICATIONS  (PRN):  acetaminophen     Tablet .. 650 milliGRAM(s) Oral every 6 hours PRN Temp greater or equal to 38C (100.4F), Mild Pain (1 - 3)  benzonatate 100 milliGRAM(s) Oral every 8 hours PRN Cough      __________________________________________________  REVIEW OF SYSTEMS:    CONSTITUTIONAL: No fever  EYES: No acute visual disturbances  NECK: No pain or stiffness  RESPIRATORY: No cough; No shortness of breath  CARDIOVASCULAR: No chest pain, no palpitations  GASTROINTESTINAL: No pain. No nausea or vomiting.  No diarrhea   NEUROLOGICAL: No headache or numbness, no tremors  MUSCULOSKELETAL: No joint pain, no muscle pain  GENITOURINARY: No dysuria, no frequency, no hesitancy  PSYCHIATRY: No depression , no anxiety  ALL OTHER  ROS negative        Vital Signs Last 24 Hrs  T(C): 36.2 (28 Jan 2025 11:33), Max: 37 (27 Jan 2025 19:51)  T(F): 97.2 (28 Jan 2025 11:33), Max: 98.6 (27 Jan 2025 19:51)  HR: 91 (28 Jan 2025 11:33) (75 - 103)  BP: 109/67 (28 Jan 2025 11:33) (103/73 - 139/62)  BP(mean): --  RR: 18 (28 Jan 2025 11:33) (18 - 18)  SpO2: 97% (28 Jan 2025 11:33) (96% - 98%)    Parameters below as of 28 Jan 2025 11:33  Patient On (Oxygen Delivery Method): room air        ________________________________________________  PHYSICAL EXAM:  GENERAL: NAD  HEENT: Normocephalic;  conjunctivae and sclerae clear; moist mucous membranes   NECK : Supple  CHEST/LUNG: Clear to auscultation bilaterally with good air entry   HEART: S1 S2  regular; no murmurs, gallops or rubs  ABDOMEN: Soft, Nontender, Nondistended; Bowel sounds present x 4 quad  EXTREMITIES: No cyanosis; no edema; no calf tenderness  SKIN: Warm and dry; no rash  NERVOUS SYSTEM:  Awake and alert; Oriented to place, person and time; no new deficits    _________________________________________________  LABS:                        13.6   5.86  )-----------( 250      ( 28 Jan 2025 07:36 )             42.5     01-28    140  |  103  |  20[H]  ----------------------------<  117[H]  4.1   |  28  |  0.66    Ca    9.3      28 Jan 2025 07:36  Phos  3.5     01-28  Mg     2.0     01-28    TPro  6.4  /  Alb  2.9[L]  /  TBili  0.6  /  DBili  x   /  AST  18  /  ALT  20  /  AlkPhos  104  01-27      Urinalysis Basic - ( 28 Jan 2025 07:36 )    Color: x / Appearance: x / SG: x / pH: x  Gluc: 117 mg/dL / Ketone: x  / Bili: x / Urobili: x   Blood: x / Protein: x / Nitrite: x   Leuk Esterase: x / RBC: x / WBC x   Sq Epi: x / Non Sq Epi: x / Bacteria: x      CAPILLARY BLOOD GLUCOSE            RADIOLOGY & ADDITIONAL TESTS:    Imaging Personally Reviewed:  YES/NO    Consultant(s) Notes Reviewed:   YES/ No    Care Discussed with Consultants :     Plan of care was discussed with patient and /or primary care giver; all questions and concerns were addressed and care was aligned with patient's wishes.

## 2025-01-28 NOTE — PROGRESS NOTE ADULT - SUBJECTIVE AND OBJECTIVE BOX
Patient is a 89y old  Female who presents with a chief complaint of AHRF 2/2 Flu vs PNA (27 Jan 2025 17:12)    pt seen in icu [  ], reg med floor [   ], bed [  ], chair at bedside [   ], a+o x3 [  ], lethargic [  ],  nad [  ]    wells [  ], ngt [  ], peg [  ], et tube [  ], cent line [  ], picc line [  ]        Allergies    No Known Drug Allergies  Pineapple (Short breath)  Kiwi (Swelling)        Vitals    T(F): 97.9 (01-28-25 @ 05:16), Max: 98.8 (01-27-25 @ 11:39)  HR: 75 (01-28-25 @ 05:16) (75 - 103)  BP: 114/74 (01-28-25 @ 05:16) (103/56 - 139/62)  RR: 18 (01-28-25 @ 05:16) (18 - 18)  SpO2: 96% (01-28-25 @ 05:16) (95% - 98%)  Wt(kg): --  CAPILLARY BLOOD GLUCOSE          Labs                          13.3   6.49  )-----------( 217      ( 27 Jan 2025 07:23 )             40.9       01-27    138  |  98  |  21[H]  ----------------------------<  109[H]  4.3   |  29  |  0.77    Ca    9.0      27 Jan 2025 07:23    TPro  6.4  /  Alb  2.9[L]  /  TBili  0.6  /  DBili  x   /  AST  18  /  ALT  20  /  AlkPhos  104  01-27            .Blood BLOOD  01-22 @ 20:40   No growth at 5 days  --  --          Radiology Results      Meds    MEDICATIONS  (STANDING):  albuterol/ipratropium for Nebulization 3 milliLiter(s) Nebulizer every 6 hours  apixaban 2.5 milliGRAM(s) Oral every 12 hours  digoxin     Tablet 125 MICROGram(s) Oral daily  furosemide    Tablet 20 milliGRAM(s) Oral daily  guaiFENesin  milliGRAM(s) Oral every 12 hours  lacosamide 100 milliGRAM(s) Oral two times a day  metoprolol tartrate 25 milliGRAM(s) Oral every 8 hours  oseltamivir 30 milliGRAM(s) Oral two times a day  oseltamivir      oxybutynin XL 5 milliGRAM(s) Oral daily  pantoprazole    Tablet 40 milliGRAM(s) Oral before breakfast  sertraline 50 milliGRAM(s) Oral daily      MEDICATIONS  (PRN):  acetaminophen     Tablet .. 650 milliGRAM(s) Oral every 6 hours PRN Temp greater or equal to 38C (100.4F), Mild Pain (1 - 3)  benzonatate 100 milliGRAM(s) Oral every 8 hours PRN Cough      Physical Exam    Neuro :  no focal deficits  Respiratory: CTA B/L  CV: RRR, S1S2, no murmurs,   Abdominal: Soft, NT, ND +BS,  Extremities: No edema, + peripheral pulses    ASSESSMENT    Pneumonia due to infectious organism    Intubate    HTN (hypertension)    Epilepsy    Atrial flutter    Urinary incontinence    Anxiety    GERD (gastroesophageal reflux disease)    No significant past surgical history        PLAN     Patient is a 89y old  Female who presents with a chief complaint of AHRF 2/2 Flu vs PNA (27 Jan 2025 17:12)    pt seen in tele [x  ], reg med floor [   ], bed [x  ], chair at bedside [   ], a+o x 1 [ x ], lethargic [  ],  nad [x  ]    Allergies    No Known Drug Allergies  Pineapple (Short breath)  Kiwi (Swelling)        Vitals    T(F): 97.9 (01-28-25 @ 05:16), Max: 98.8 (01-27-25 @ 11:39)  HR: 75 (01-28-25 @ 05:16) (75 - 103)  BP: 114/74 (01-28-25 @ 05:16) (103/56 - 139/62)  RR: 18 (01-28-25 @ 05:16) (18 - 18)  SpO2: 96% (01-28-25 @ 05:16) (95% - 98%)  Wt(kg): --  CAPILLARY BLOOD GLUCOSE          Labs                          13.3   6.49  )-----------( 217      ( 27 Jan 2025 07:23 )             40.9       01-27    138  |  98  |  21[H]  ----------------------------<  109[H]  4.3   |  29  |  0.77    Ca    9.0      27 Jan 2025 07:23    TPro  6.4  /  Alb  2.9[L]  /  TBili  0.6  /  DBili  x   /  AST  18  /  ALT  20  /  AlkPhos  104  01-27            .Blood BLOOD  01-22 @ 20:40   No growth at 5 days  --  --        < from: TTE W or WO Ultrasound Enhancing Agent (01.24.25 @ 11:25) >  CONCLUSIONS:      1. Left ventricular cavity is small. Left ventricular wall thickness is normal. Left ventricular systolic function is normal. There are no regional wall motion abnormalities seen.   2. There is mild (grade 1) left ventricular diastolic dysfunction.   3. Normal right ventricular cavity size, with normal wall thickness, and reduced right ventricular systolic function. Tricuspid annular plane systolic excursion (TAPSE) is 1.6 cm (normal >=1.7 cm).   4. The right atrium is dilated.   5. Left atrium is moderately dilated.   6. Moderate mitral regurgitation.   7. Moderate tricuspid regurgitation.   8. Estimated pulmonary artery systolic pressure is 45 mmHg, consistent with mild pulmonary hypertension.   9. Left pleural effusion noted.  10. Mild aortic regurgitation.  11. Mild pulmonic regurgitation.  12. Moderate aortic stenosis.  13. No pericardial effusion seen.  14. No prior echocardiogram is available forcomparison.    < end of copied text >        Radiology Results      Meds    MEDICATIONS  (STANDING):  albuterol/ipratropium for Nebulization 3 milliLiter(s) Nebulizer every 6 hours  apixaban 2.5 milliGRAM(s) Oral every 12 hours  digoxin     Tablet 125 MICROGram(s) Oral daily  furosemide    Tablet 20 milliGRAM(s) Oral daily  guaiFENesin  milliGRAM(s) Oral every 12 hours  lacosamide 100 milliGRAM(s) Oral two times a day  metoprolol tartrate 25 milliGRAM(s) Oral every 8 hours  oseltamivir 30 milliGRAM(s) Oral two times a day  oseltamivir      oxybutynin XL 5 milliGRAM(s) Oral daily  pantoprazole    Tablet 40 milliGRAM(s) Oral before breakfast  sertraline 50 milliGRAM(s) Oral daily      MEDICATIONS  (PRN):  acetaminophen     Tablet .. 650 milliGRAM(s) Oral every 6 hours PRN Temp greater or equal to 38C (100.4F), Mild Pain (1 - 3)  benzonatate 100 milliGRAM(s) Oral every 8 hours PRN Cough      Physical Exam    Neuro :  no focal deficits  Respiratory: CTA B/L  CV: RRR, S1S2, no murmurs,   Abdominal: Soft, NT, ND +BS,  Extremities: No edema, + peripheral pulses      ASSESSMENT    Acute hypoxic respiratory failure  Pneumonia due to infectious organism  Influenza A.   Lung nodule   Pleural effusion due to CHF (congestive heart failure).  h/o A-fib on Eliquis,   HTN,   seizures,   GERD,   anxiety,   depression  Urinary incontinence      PLAN    Droplets precautions  Analgesics PRN  Robitussin 10 cc po TID PRN  Tamiflu 30 mgs po daily x 5 days.  CXR follow up.   blood cx neg noted above   s/p course of reocphin and zithromax   Oxygen supp to keep SPO2 90% or above  Duoneb 1 unit dose neb Q6H PRN  pulm toilet  Asp precautions  CXR with Bilateral pleural effusions, right is moderate and left is trace. Right   upper lung zone hazy opacity. The cardiomediastinal silhouette is normal.   Severe bilateral glenohumeral joint disease.     pulm cons   Quantiferon  TB gold test  Nodify Testing as OP  PFTs as OP.  Echo with Left ventricular systolic function is normal. mild (grade 1) left ventricular   diastolic dysfunction. mild pulmonary hypertension. Left pleural effusion noted.    Moderate aortic stenosis noted.    Cardio follow up.  cont eliquis 2.5mg bid,  lopressor 25mg q8h.Add dig .125mg qd.  IV lasix 20mg bid.   phys tx eval noted and rec phys tx 3-5x/week x 3 weeks [facility]  endo f/u   patient does not know about thyroid nodule. Not a good historian.  Physical examination is negative for Goiter  Check Thyroid ultrasound and TPO antibodies.   cont current meds

## 2025-01-28 NOTE — PROGRESS NOTE ADULT - SUBJECTIVE AND OBJECTIVE BOX
Patient is a 89y old  Female who presents with a chief complaint of AHRF 2/2 Flu vs PNA (28 Jan 2025 10:33)  She is awake, alert, laying in bed in NAD. ECHO done. Clinically improved    INTERVAL HPI/OVERNIGHT EVENTS:      VITAL SIGNS:  T(F): 97.3 (01-28-25 @ 08:19)  HR: 80 (01-28-25 @ 08:19)  BP: 126/72 (01-28-25 @ 08:19)  RR: 18 (01-28-25 @ 08:19)  SpO2: 97% (01-28-25 @ 08:19)  Wt(kg): --  I&O's Detail    27 Jan 2025 07:01  -  28 Jan 2025 07:00  --------------------------------------------------------  IN:  Total IN: 0 mL    OUT:    Blood Loss (mL): 600 mL  Total OUT: 600 mL    Total NET: -600 mL              REVIEW OF SYSTEMS:    CONSTITUTIONAL:  No fevers, chills, sweats    HEENT:  Eyes:  No diplopia or blurred vision. ENT:  No earache, sore throat or runny nose.    CARDIOVASCULAR:  No pressure, squeezing, tightness, or heaviness about the chest; no palpitations.    RESPIRATORY:  Per HPI    GASTROINTESTINAL:  No abdominal pain, nausea, vomiting or diarrhea.    GENITOURINARY:  No dysuria, frequency or urgency.    NEUROLOGIC:  No paresthesias, fasciculations, seizures or weakness.    PSYCHIATRIC:  No disorder of thought or mood.      PHYSICAL EXAM:    Constitutional: Well developed and nourished  Eyes:Perrla  ENMT: normal  Neck:supple  Respiratory: Rhonchi B/L  Cardiovascular: S1 S2 regular  Gastrointestinal: Soft, Non tender  Extremities: No edema  Vascular:normal  Neurological:Awake, alert,Ox3  Musculoskeletal:Normal      MEDICATIONS  (STANDING):  albuterol/ipratropium for Nebulization 3 milliLiter(s) Nebulizer every 6 hours  apixaban 2.5 milliGRAM(s) Oral every 12 hours  digoxin     Tablet 125 MICROGram(s) Oral daily  furosemide    Tablet 20 milliGRAM(s) Oral daily  guaiFENesin  milliGRAM(s) Oral every 12 hours  lacosamide 100 milliGRAM(s) Oral two times a day  metoprolol tartrate 25 milliGRAM(s) Oral every 8 hours  oseltamivir 30 milliGRAM(s) Oral two times a day  oseltamivir      oxybutynin XL 5 milliGRAM(s) Oral daily  pantoprazole    Tablet 40 milliGRAM(s) Oral before breakfast  sertraline 50 milliGRAM(s) Oral daily    MEDICATIONS  (PRN):  acetaminophen     Tablet .. 650 milliGRAM(s) Oral every 6 hours PRN Temp greater or equal to 38C (100.4F), Mild Pain (1 - 3)  benzonatate 100 milliGRAM(s) Oral every 8 hours PRN Cough      Allergies    No Known Drug Allergies  Pineapple (Short breath)  Kiwi (Swelling)    Intolerances        LABS:                        13.6   5.86  )-----------( 250      ( 28 Jan 2025 07:36 )             42.5     01-28    140  |  103  |  20[H]  ----------------------------<  117[H]  4.1   |  28  |  0.66    Ca    9.3      28 Jan 2025 07:36  Phos  3.5     01-28  Mg     2.0     01-28    TPro  6.4  /  Alb  2.9[L]  /  TBili  0.6  /  DBili  x   /  AST  18  /  ALT  20  /  AlkPhos  104  01-27      Urinalysis Basic - ( 28 Jan 2025 07:36 )    Color: x / Appearance: x / SG: x / pH: x  Gluc: 117 mg/dL / Ketone: x  / Bili: x / Urobili: x   Blood: x / Protein: x / Nitrite: x   Leuk Esterase: x / RBC: x / WBC x   Sq Epi: x / Non Sq Epi: x / Bacteria: x            CAPILLARY BLOOD GLUCOSE            RADIOLOGY & ADDITIONAL TESTS:    CXR:    Ct scan chest:    ekg;    echo: Patient is a 89y old  Female who presents with a chief complaint of AHRF 2/2 Flu vs PNA (28 Jan 2025 10:33)  She is awake, alert, laying in bed in NAD. ECHO done. Clinically improved. Still with Rapid AF    INTERVAL HPI/OVERNIGHT EVENTS:      VITAL SIGNS:  T(F): 97.3 (01-28-25 @ 08:19)  HR: 80 (01-28-25 @ 08:19)  BP: 126/72 (01-28-25 @ 08:19)  RR: 18 (01-28-25 @ 08:19)  SpO2: 97% (01-28-25 @ 08:19)  Wt(kg): --  I&O's Detail    27 Jan 2025 07:01  -  28 Jan 2025 07:00  --------------------------------------------------------  IN:  Total IN: 0 mL    OUT:    Blood Loss (mL): 600 mL  Total OUT: 600 mL    Total NET: -600 mL              REVIEW OF SYSTEMS:    CONSTITUTIONAL:  No fevers, chills, sweats    HEENT:  Eyes:  No diplopia or blurred vision. ENT:  No earache, sore throat or runny nose.    CARDIOVASCULAR:  No pressure, squeezing, tightness, or heaviness about the chest; no palpitations.    RESPIRATORY:  Per HPI    GASTROINTESTINAL:  No abdominal pain, nausea, vomiting or diarrhea.    GENITOURINARY:  No dysuria, frequency or urgency.    NEUROLOGIC:  No paresthesias, fasciculations, seizures or weakness.    PSYCHIATRIC:  No disorder of thought or mood.      PHYSICAL EXAM:    Constitutional: Well developed and nourished  Eyes:Perrla  ENMT: normal  Neck:supple  Respiratory: Rhonchi B/L  Cardiovascular: S1 S2 regular  Gastrointestinal: Soft, Non tender  Extremities: No edema  Vascular:normal  Neurological:Awake, alert,Ox3  Musculoskeletal:Normal      MEDICATIONS  (STANDING):  albuterol/ipratropium for Nebulization 3 milliLiter(s) Nebulizer every 6 hours  apixaban 2.5 milliGRAM(s) Oral every 12 hours  digoxin     Tablet 125 MICROGram(s) Oral daily  furosemide    Tablet 20 milliGRAM(s) Oral daily  guaiFENesin  milliGRAM(s) Oral every 12 hours  lacosamide 100 milliGRAM(s) Oral two times a day  metoprolol tartrate 25 milliGRAM(s) Oral every 8 hours  oseltamivir 30 milliGRAM(s) Oral two times a day  oseltamivir      oxybutynin XL 5 milliGRAM(s) Oral daily  pantoprazole    Tablet 40 milliGRAM(s) Oral before breakfast  sertraline 50 milliGRAM(s) Oral daily    MEDICATIONS  (PRN):  acetaminophen     Tablet .. 650 milliGRAM(s) Oral every 6 hours PRN Temp greater or equal to 38C (100.4F), Mild Pain (1 - 3)  benzonatate 100 milliGRAM(s) Oral every 8 hours PRN Cough      Allergies    No Known Drug Allergies  Pineapple (Short breath)  Kiwi (Swelling)    Intolerances        LABS:                        13.6   5.86  )-----------( 250      ( 28 Jan 2025 07:36 )             42.5     01-28    140  |  103  |  20[H]  ----------------------------<  117[H]  4.1   |  28  |  0.66    Ca    9.3      28 Jan 2025 07:36  Phos  3.5     01-28  Mg     2.0     01-28    TPro  6.4  /  Alb  2.9[L]  /  TBili  0.6  /  DBili  x   /  AST  18  /  ALT  20  /  AlkPhos  104  01-27      Urinalysis Basic - ( 28 Jan 2025 07:36 )    Color: x / Appearance: x / SG: x / pH: x  Gluc: 117 mg/dL / Ketone: x  / Bili: x / Urobili: x   Blood: x / Protein: x / Nitrite: x   Leuk Esterase: x / RBC: x / WBC x   Sq Epi: x / Non Sq Epi: x / Bacteria: x            CAPILLARY BLOOD GLUCOSE            RADIOLOGY & ADDITIONAL TESTS:    CXR:  < from: Xray Chest 1 View-PORTABLE IMMEDIATE (Xray Chest 1 View-PORTABLE IMMEDIATE .) (01.24.25 @ 17:38) >  Findings:    Bilateral pleural effusions, right is moderate and left is trace. Right   upper lung zone hazy opacity. The cardiomediastinal silhouette is normal.   Severe bilateral glenohumeral joint disease.      < end of copied text >    Ct scan chest:    ekg;    echo:< from: TTE W or WO Ultrasound Enhancing Agent (01.24.25 @ 11:25) >  CONCLUSIONS:      1. Left ventricular cavity is small. Left ventricular wall thickness is normal. Left ventricular systolic function is normal. There are no regional wall motion abnormalities seen.   2. There is mild (grade 1) left ventricular diastolic dysfunction.   3. Normal right ventricular cavity size, with normal wall thickness, and reduced right ventricular systolic function. Tricuspid annular plane systolic excursion (TAPSE) is 1.6 cm (normal >=1.7 cm).   4. The right atrium is dilated.   5. Left atrium is moderately dilated.   6. Moderate mitral regurgitation.   7. Moderate tricuspid regurgitation.   8. Estimated pulmonary artery systolic pressure is 45 mmHg, consistent with mild pulmonary hypertension.   9. Left pleural effusion noted.  10. Mild aortic regurgitation.  11. Mild pulmonic regurgitation.  12. Moderate aortic stenosis.  13. No pericardial effusion seen.  14. No prior echocardiogram is available forcomparison.    < end of copied text >

## 2025-01-29 LAB
GAMMA INTERFERON BACKGROUND BLD IA-ACNC: 0.05 IU/ML — SIGNIFICANT CHANGE UP
M TB IFN-G BLD-IMP: ABNORMAL
M TB IFN-G CD4+ BCKGRND COR BLD-ACNC: 0 IU/ML — SIGNIFICANT CHANGE UP
M TB IFN-G CD4+CD8+ BCKGRND COR BLD-ACNC: 0 IU/ML — SIGNIFICANT CHANGE UP
QUANT TB PLUS MITOGEN MINUS NIL: 0.08 IU/ML — SIGNIFICANT CHANGE UP
T4 FREE SERPL-MCNC: 1.3 NG/DL — SIGNIFICANT CHANGE UP (ref 0.9–1.7)
THYROPEROXIDASE AB SERPL-ACNC: 26 IU/ML — SIGNIFICANT CHANGE UP

## 2025-01-29 RX ORDER — OSELTAMIVIR PHOSPHATE 75 MG/1
30 CAPSULE ORAL ONCE
Refills: 0 | Status: COMPLETED | OUTPATIENT
Start: 2025-01-29 | End: 2025-01-29

## 2025-01-29 RX ORDER — ACETAMINOPHEN, DIPHENHYDRAMINE HCL, PHENYLEPHRINE HCL 325; 25; 5 MG/1; MG/1; MG/1
3 TABLET ORAL AT BEDTIME
Refills: 0 | Status: DISCONTINUED | OUTPATIENT
Start: 2025-01-29 | End: 2025-01-31

## 2025-01-29 RX ADMIN — APIXABAN 2.5 MILLIGRAM(S): 5 TABLET, FILM COATED ORAL at 05:30

## 2025-01-29 RX ADMIN — ACETAMINOPHEN 650 MILLIGRAM(S): 160 SUSPENSION ORAL at 09:29

## 2025-01-29 RX ADMIN — Medication 25 MILLIGRAM(S): at 13:55

## 2025-01-29 RX ADMIN — Medication 25 MILLIGRAM(S): at 05:30

## 2025-01-29 RX ADMIN — ACETAMINOPHEN, DIPHENHYDRAMINE HCL, PHENYLEPHRINE HCL 3 MILLIGRAM(S): 325; 25; 5 TABLET ORAL at 21:22

## 2025-01-29 RX ADMIN — LACOSAMIDE 100 MILLIGRAM(S): 200 TABLET, FILM COATED ORAL at 17:56

## 2025-01-29 RX ADMIN — Medication 125 MICROGRAM(S): at 05:30

## 2025-01-29 RX ADMIN — LACOSAMIDE 100 MILLIGRAM(S): 200 TABLET, FILM COATED ORAL at 05:31

## 2025-01-29 RX ADMIN — Medication 600 MILLIGRAM(S): at 17:55

## 2025-01-29 RX ADMIN — OXYBUTYNIN CHLORIDE 5 MILLIGRAM(S): 5 TABLET, EXTENDED RELEASE ORAL at 12:22

## 2025-01-29 RX ADMIN — Medication 600 MILLIGRAM(S): at 05:30

## 2025-01-29 RX ADMIN — Medication 20 MILLIGRAM(S): at 05:30

## 2025-01-29 RX ADMIN — APIXABAN 2.5 MILLIGRAM(S): 5 TABLET, FILM COATED ORAL at 17:56

## 2025-01-29 RX ADMIN — IPRATROPIUM BROMIDE AND ALBUTEROL SULFATE 3 MILLILITER(S): .5; 2.5 SOLUTION RESPIRATORY (INHALATION) at 09:30

## 2025-01-29 RX ADMIN — ACETAMINOPHEN 650 MILLIGRAM(S): 160 SUSPENSION ORAL at 21:04

## 2025-01-29 RX ADMIN — ACETAMINOPHEN 650 MILLIGRAM(S): 160 SUSPENSION ORAL at 20:34

## 2025-01-29 RX ADMIN — OSELTAMIVIR PHOSPHATE 30 MILLIGRAM(S): 75 CAPSULE ORAL at 09:30

## 2025-01-29 RX ADMIN — IPRATROPIUM BROMIDE AND ALBUTEROL SULFATE 3 MILLILITER(S): .5; 2.5 SOLUTION RESPIRATORY (INHALATION) at 15:45

## 2025-01-29 RX ADMIN — IPRATROPIUM BROMIDE AND ALBUTEROL SULFATE 3 MILLILITER(S): .5; 2.5 SOLUTION RESPIRATORY (INHALATION) at 20:27

## 2025-01-29 RX ADMIN — Medication 50 MILLIGRAM(S): at 12:23

## 2025-01-29 RX ADMIN — PANTOPRAZOLE 40 MILLIGRAM(S): 20 TABLET, DELAYED RELEASE ORAL at 05:30

## 2025-01-29 RX ADMIN — ACETAMINOPHEN 650 MILLIGRAM(S): 160 SUSPENSION ORAL at 10:20

## 2025-01-29 RX ADMIN — Medication 25 MILLIGRAM(S): at 21:18

## 2025-01-29 NOTE — PROGRESS NOTE ADULT - SUBJECTIVE AND OBJECTIVE BOX
Patient is a 89y old  Female who presents with a chief complaint of AHRF 2/2 Flu vs PNA (28 Jan 2025 15:16)    pt seen in tele [x  ], reg med floor [   ], bed [x  ], chair at bedside [   ], a+o x 1 [ x ], lethargic [  ],    nad [x  ]        Allergies    No Known Drug Allergies  Pineapple (Short breath)  Kiwi (Swelling)        Vitals    T(F): 98.2 (01-29-25 @ 05:16), Max: 98.4 (01-28-25 @ 16:15)  HR: 81 (01-29-25 @ 05:16) (80 - 94)  BP: 130/73 (01-29-25 @ 05:16) (109/67 - 138/72)  RR: 18 (01-29-25 @ 05:16) (18 - 18)  SpO2: 96% (01-29-25 @ 05:16) (96% - 97%)  Wt(kg): --  CAPILLARY BLOOD GLUCOSE          Labs                          13.6   5.86  )-----------( 250      ( 28 Jan 2025 07:36 )             42.5       01-28    140  |  103  |  20[H]  ----------------------------<  117[H]  4.1   |  28  |  0.66    Ca    9.3      28 Jan 2025 07:36  Phos  3.5     01-28  Mg     2.0     01-28    TPro  6.4  /  Alb  2.9[L]  /  TBili  0.6  /  DBili  x   /  AST  18  /  ALT  20  /  AlkPhos  104  01-27            .Blood BLOOD  01-22 @ 20:40   No growth at 5 days  --  --          Radiology Results      Meds    MEDICATIONS  (STANDING):  albuterol/ipratropium for Nebulization 3 milliLiter(s) Nebulizer every 6 hours  apixaban 2.5 milliGRAM(s) Oral every 12 hours  digoxin     Tablet 125 MICROGram(s) Oral daily  furosemide    Tablet 20 milliGRAM(s) Oral daily  guaiFENesin  milliGRAM(s) Oral every 12 hours  lacosamide 100 milliGRAM(s) Oral two times a day  metoprolol tartrate 25 milliGRAM(s) Oral every 8 hours  oseltamivir 30 milliGRAM(s) Oral two times a day  oseltamivir      oxybutynin XL 5 milliGRAM(s) Oral daily  pantoprazole    Tablet 40 milliGRAM(s) Oral before breakfast  sertraline 50 milliGRAM(s) Oral daily      MEDICATIONS  (PRN):  acetaminophen     Tablet .. 650 milliGRAM(s) Oral every 6 hours PRN Temp greater or equal to 38C (100.4F), Mild Pain (1 - 3)  benzonatate 100 milliGRAM(s) Oral every 8 hours PRN Cough      Physical Exam    Neuro :  no focal deficits  Respiratory: CTA B/L  CV: RRR, S1S2, no murmurs,   Abdominal: Soft, NT, ND +BS,  Extremities: No edema, + peripheral pulses      ASSESSMENT    Acute hypoxic respiratory failure  Pneumonia due to infectious organism  Influenza A.   Lung nodule   Pleural effusion due to CHF (congestive heart failure).  h/o A-fib on Eliquis,   HTN,   seizures,   GERD,   anxiety,   depression  Urinary incontinence      PLAN    Droplets precautions  Analgesics PRN  Robitussin 10 cc po TID PRN  Tamiflu 30 mgs po daily x 5 days.  CXR follow up.   blood cx neg noted above   s/p course of reocphin and zithromax   Oxygen supp to keep SPO2 90% or above  Duoneb 1 unit dose neb Q6H PRN  pulm toilet  Asp precautions  CXR with Bilateral pleural effusions, right is moderate and left is trace. Right   upper lung zone hazy opacity. The cardiomediastinal silhouette is normal.   Severe bilateral glenohumeral joint disease.     pulm cons   Quantiferon  TB gold test  Nodify Testing as OP  PFTs as OP.  Echo with Left ventricular systolic function is normal. mild (grade 1) left ventricular   diastolic dysfunction. mild pulmonary hypertension. Left pleural effusion noted.    Moderate aortic stenosis noted.    Cardio follow up.  cont eliquis 2.5mg bid,  lopressor 25mg q8h.Add dig .125mg qd.  IV lasix 20mg bid.   phys tx eval noted and rec phys tx 3-5x/week x 3 weeks at [facility]  endo f/u   patient does not know about thyroid nodule. Not a good historian.  Physical examination is negative for Goiter  Check Thyroid ultrasound and TPO antibodies.   cont current meds          Patient is a 89y old  Female who presents with a chief complaint of AHRF 2/2 Flu vs PNA (28 Jan 2025 15:16)    pt seen in tele [x  ], reg med floor [   ], bed [x  ], chair at bedside [   ], a+o x 1 [ x ], lethargic [  ],    nad [x  ]        Allergies    No Known Drug Allergies  Pineapple (Short breath)  Kiwi (Swelling)        Vitals    T(F): 98.2 (01-29-25 @ 05:16), Max: 98.4 (01-28-25 @ 16:15)  HR: 81 (01-29-25 @ 05:16) (80 - 94)  BP: 130/73 (01-29-25 @ 05:16) (109/67 - 138/72)  RR: 18 (01-29-25 @ 05:16) (18 - 18)  SpO2: 96% (01-29-25 @ 05:16) (96% - 97%)  Wt(kg): --  CAPILLARY BLOOD GLUCOSE          Labs                          13.6   5.86  )-----------( 250      ( 28 Jan 2025 07:36 )             42.5       01-28    140  |  103  |  20[H]  ----------------------------<  117[H]  4.1   |  28  |  0.66    Ca    9.3      28 Jan 2025 07:36  Phos  3.5     01-28  Mg     2.0     01-28    TPro  6.4  /  Alb  2.9[L]  /  TBili  0.6  /  DBili  x   /  AST  18  /  ALT  20  /  AlkPhos  104  01-27            .Blood BLOOD  01-22 @ 20:40   No growth at 5 days  --  --          Radiology Results      Meds    MEDICATIONS  (STANDING):  albuterol/ipratropium for Nebulization 3 milliLiter(s) Nebulizer every 6 hours  apixaban 2.5 milliGRAM(s) Oral every 12 hours  digoxin     Tablet 125 MICROGram(s) Oral daily  furosemide    Tablet 20 milliGRAM(s) Oral daily  guaiFENesin  milliGRAM(s) Oral every 12 hours  lacosamide 100 milliGRAM(s) Oral two times a day  metoprolol tartrate 25 milliGRAM(s) Oral every 8 hours  oseltamivir 30 milliGRAM(s) Oral two times a day  oseltamivir      oxybutynin XL 5 milliGRAM(s) Oral daily  pantoprazole    Tablet 40 milliGRAM(s) Oral before breakfast  sertraline 50 milliGRAM(s) Oral daily      MEDICATIONS  (PRN):  acetaminophen     Tablet .. 650 milliGRAM(s) Oral every 6 hours PRN Temp greater or equal to 38C (100.4F), Mild Pain (1 - 3)  benzonatate 100 milliGRAM(s) Oral every 8 hours PRN Cough      Physical Exam    Neuro :  no focal deficits  Respiratory: CTA B/L  CV: RRR, S1S2, no murmurs,   Abdominal: Soft, NT, ND +BS,  Extremities: No edema, + peripheral pulses      ASSESSMENT    Acute hypoxic respiratory failure  Pneumonia due to infectious organism  Influenza A.   Lung nodule   Pleural effusion due to CHF (congestive heart failure).  h/o A-fib on Eliquis,   HTN,   seizures,   GERD,   anxiety,   depression  Urinary incontinence      PLAN    Droplets precautions  Analgesics PRN  Robitussin 10 cc po TID PRN  completed Tamiflu 30 mgs po daily x 5 days.  CXR follow up.   blood cx neg noted above   s/p course of reocphin and zithromax   Oxygen supp to keep SPO2 90% or above  Duoneb 1 unit dose neb Q6H PRN  pulm toilet  Asp precautions  CXR with Bilateral pleural effusions, right is moderate and left is trace. Right   upper lung zone hazy opacity. The cardiomediastinal silhouette is normal.   Severe bilateral glenohumeral joint disease.     pulm cons   Quantiferon  TB gold test  Nodify Testing as OP  PFTs as OP.  Echo with Left ventricular systolic function is normal. mild (grade 1) left ventricular   diastolic dysfunction. mild pulmonary hypertension. Left pleural effusion noted.    Moderate aortic stenosis noted.    Cardio follow up.  cont eliquis 2.5mg bid,  lopressor 25mg q8h.Add dig .125mg qd.  lasix changed to 20mg po daily   phys tx eval noted and rec phys tx 3-5x/week x 3 weeks at [facility]  endo f/u   patient does not know about thyroid nodule. Not a good historian.  Physical examination is negative for Goiter  Check Thyroid ultrasound and TPO antibodies.   cont current meds

## 2025-01-29 NOTE — PROGRESS NOTE ADULT - SUBJECTIVE AND OBJECTIVE BOX
Patient is a 89y old  Female who presents with a chief complaint of AHRF 2/2 Flu vs PNA (29 Jan 2025 09:20)  Awake, alert, Laying in bed in NAD. Doing well on RA    INTERVAL HPI/OVERNIGHT EVENTS:      VITAL SIGNS:  T(F): 98.8 (01-29-25 @ 08:25)  HR: 69 (01-29-25 @ 08:25)  BP: 152/92 (01-29-25 @ 08:25)  RR: 18 (01-29-25 @ 08:25)  SpO2: 97% (01-29-25 @ 08:25)  Wt(kg): --  I&O's Detail    28 Jan 2025 07:01  -  29 Jan 2025 07:00  --------------------------------------------------------  IN:  Total IN: 0 mL    OUT:    Voided (mL): 1400 mL  Total OUT: 1400 mL    Total NET: -1400 mL              REVIEW OF SYSTEMS:    CONSTITUTIONAL:  No fevers, chills, sweats    HEENT:  Eyes:  No diplopia or blurred vision. ENT:  No earache, sore throat or runny nose.    CARDIOVASCULAR:  No pressure, squeezing, tightness, or heaviness about the chest; no palpitations.    RESPIRATORY:  Per HPI    GASTROINTESTINAL:  No abdominal pain, nausea, vomiting or diarrhea.    GENITOURINARY:  No dysuria, frequency or urgency.    NEUROLOGIC:  No paresthesias, fasciculations, seizures or weakness.    PSYCHIATRIC:  No disorder of thought or mood.      PHYSICAL EXAM:    Constitutional: Well developed and nourished  Eyes:Perrla  ENMT: normal  Neck:supple  Respiratory: good air entry  Cardiovascular: S1 S2 regular  Gastrointestinal: Soft, Non tender  Extremities: No edema  Vascular:normal  Neurological:Awake, alert,Ox3  Musculoskeletal:Normal      MEDICATIONS  (STANDING):  albuterol/ipratropium for Nebulization 3 milliLiter(s) Nebulizer every 6 hours  apixaban 2.5 milliGRAM(s) Oral every 12 hours  digoxin     Tablet 125 MICROGram(s) Oral daily  furosemide    Tablet 20 milliGRAM(s) Oral daily  guaiFENesin  milliGRAM(s) Oral every 12 hours  lacosamide 100 milliGRAM(s) Oral two times a day  metoprolol tartrate 25 milliGRAM(s) Oral every 8 hours  oxybutynin XL 5 milliGRAM(s) Oral daily  pantoprazole    Tablet 40 milliGRAM(s) Oral before breakfast  sertraline 50 milliGRAM(s) Oral daily    MEDICATIONS  (PRN):  acetaminophen     Tablet .. 650 milliGRAM(s) Oral every 6 hours PRN Temp greater or equal to 38C (100.4F), Mild Pain (1 - 3)  benzonatate 100 milliGRAM(s) Oral every 8 hours PRN Cough      Allergies    No Known Drug Allergies  Pineapple (Short breath)  Kiwi (Swelling)    Intolerances        LABS:                        13.6   5.86  )-----------( 250      ( 28 Jan 2025 07:36 )             42.5     01-28    140  |  103  |  20[H]  ----------------------------<  117[H]  4.1   |  28  |  0.66    Ca    9.3      28 Jan 2025 07:36  Phos  3.5     01-28  Mg     2.0     01-28        Urinalysis Basic - ( 28 Jan 2025 07:36 )    Color: x / Appearance: x / SG: x / pH: x  Gluc: 117 mg/dL / Ketone: x  / Bili: x / Urobili: x   Blood: x / Protein: x / Nitrite: x   Leuk Esterase: x / RBC: x / WBC x   Sq Epi: x / Non Sq Epi: x / Bacteria: x            CAPILLARY BLOOD GLUCOSE            RADIOLOGY & ADDITIONAL TESTS:    CXR:    Ct scan chest:    ekg;    echo:

## 2025-01-29 NOTE — PROGRESS NOTE ADULT - PROBLEM SELECTOR PLAN 2
Oxygen supp to keep SPO2 90% or above  Duoneb 1 unit dose neb Q6H PRN  Solumedrol po  pulm toilet  Asp precautions  CXR follow up. Oxygen supp to keep SPO2 90% or above  Duoneb 1 unit dose neb Q6H PRN  Steroids po  pulm toilet  Asp precautions  CXR follow up.

## 2025-01-29 NOTE — PROGRESS NOTE ADULT - SUBJECTIVE AND OBJECTIVE BOX
Date of Service 01-29-25 @ 09:20    CHIEF COMPLAINT:Patient is a 89y old  Female who presents with a chief complaint of AHRF 2/2 Flu vs PNA .Pt appears comfortable.    	  REVIEW OF SYSTEMS:  CONSTITUTIONAL: No fever, weight loss, or fatigue  EYES: No eye pain, visual disturbances, or discharge  ENT:  No difficulty hearing, tinnitus, vertigo; No sinus or throat pain  NECK: No pain or stiffness  RESPIRATORY: No cough, wheezing, chills or hemoptysis; No Shortness of Breath  CARDIOVASCULAR: No chest pain, palpitations, passing out, dizziness, or leg swelling  GASTROINTESTINAL: No abdominal or epigastric pain. No nausea, vomiting, or hematemesis; No diarrhea or constipation. No melena or hematochezia.  GENITOURINARY: No dysuria, frequency, hematuria, or incontinence  NEUROLOGICAL: No headaches, memory loss, loss of strength, numbness, or tremors  SKIN: No itching, burning, rashes, or lesions   LYMPH Nodes: No enlarged glands  ENDOCRINE: No heat or cold intolerance; No hair loss  MUSCULOSKELETAL: No joint pain or swelling; No muscle, back, or extremity pain  PSYCHIATRIC: No depression, anxiety, mood swings, or difficulty sleeping  HEME/LYMPH: No easy bruising, or bleeding gums  ALLERGY AND IMMUNOLOGIC: No hives or eczema	      PHYSICAL EXAM:  T(C): 37.1 (01-29-25 @ 08:25), Max: 37.1 (01-29-25 @ 08:25)  HR: 69 (01-29-25 @ 08:25) (69 - 94)  BP: 152/92 (01-29-25 @ 08:25) (109/67 - 152/92)  RR: 18 (01-29-25 @ 08:25) (18 - 18)  SpO2: 97% (01-29-25 @ 08:25) (96% - 97%)  Wt(kg): --  I&O's Summary    28 Jan 2025 07:01  -  29 Jan 2025 07:00  --------------------------------------------------------  IN: 0 mL / OUT: 1400 mL / NET: -1400 mL        Appearance: Normal	  HEENT:   Normal oral mucosa, PERRL, EOMI	  Lymphatic: No lymphadenopathy  Cardiovascular: Normal S1 S2, No JVD, No murmurs, No edema  Respiratory: Lungs clear to auscultation	  Psychiatry: A & O x 3, Mood & affect appropriate  Gastrointestinal:  Soft, Non-tender, + BS	  Skin: No rashes, No ecchymoses, No cyanosis	  Neurologic: Non-focal  Extremities: Normal range of motion, No clubbing, cyanosis or edema  Vascular: Peripheral pulses palpable 2+ bilaterally    MEDICATIONS  (STANDING):  albuterol/ipratropium for Nebulization 3 milliLiter(s) Nebulizer every 6 hours  apixaban 2.5 milliGRAM(s) Oral every 12 hours  digoxin     Tablet 125 MICROGram(s) Oral daily  furosemide    Tablet 20 milliGRAM(s) Oral daily  guaiFENesin  milliGRAM(s) Oral every 12 hours  lacosamide 100 milliGRAM(s) Oral two times a day  metoprolol tartrate 25 milliGRAM(s) Oral every 8 hours  oseltamivir 30 milliGRAM(s) Oral once  oxybutynin XL 5 milliGRAM(s) Oral daily  pantoprazole    Tablet 40 milliGRAM(s) Oral before breakfast  sertraline 50 milliGRAM(s) Oral daily      	  	  LABS:	 	                        13.6   5.86  )-----------( 250      ( 28 Jan 2025 07:36 )             42.5     01-28    140  |  103  |  20[H]  ----------------------------<  117[H]  4.1   |  28  |  0.66    Ca    9.3      28 Jan 2025 07:36  Phos  3.5     01-28  Mg     2.0     01-28        Lipid Profile: Cholesterol 105  LDL --  HDL 39  TG 65  Ldl calc 52  Ratio --    HgA1c:   TSH: Thyroid Stimulating Hormone, Serum: 1.69 uU/mL (01-25 @ 07:29)  Thyroid Stimulating Hormone, Serum: 1.58 uU/mL (01-24 @ 08:27)

## 2025-01-30 LAB
ANION GAP SERPL CALC-SCNC: 7 MMOL/L — SIGNIFICANT CHANGE UP (ref 5–17)
BUN SERPL-MCNC: 33 MG/DL — HIGH (ref 7–18)
CALCIUM SERPL-MCNC: 9.3 MG/DL — SIGNIFICANT CHANGE UP (ref 8.4–10.5)
CHLORIDE SERPL-SCNC: 102 MMOL/L — SIGNIFICANT CHANGE UP (ref 96–108)
CO2 SERPL-SCNC: 27 MMOL/L — SIGNIFICANT CHANGE UP (ref 22–31)
CREAT SERPL-MCNC: 0.78 MG/DL — SIGNIFICANT CHANGE UP (ref 0.5–1.3)
EGFR: 69 ML/MIN/1.73M2 — SIGNIFICANT CHANGE UP
GLUCOSE SERPL-MCNC: 118 MG/DL — HIGH (ref 70–99)
HCT VFR BLD CALC: 41.6 % — SIGNIFICANT CHANGE UP (ref 34.5–45)
HGB BLD-MCNC: 13.5 G/DL — SIGNIFICANT CHANGE UP (ref 11.5–15.5)
MCHC RBC-ENTMCNC: 28 PG — SIGNIFICANT CHANGE UP (ref 27–34)
MCHC RBC-ENTMCNC: 32.5 G/DL — SIGNIFICANT CHANGE UP (ref 32–36)
MCV RBC AUTO: 86.3 FL — SIGNIFICANT CHANGE UP (ref 80–100)
NRBC # BLD: 0 /100 WBCS — SIGNIFICANT CHANGE UP (ref 0–0)
NRBC BLD-RTO: 0 /100 WBCS — SIGNIFICANT CHANGE UP (ref 0–0)
PLATELET # BLD AUTO: 284 K/UL — SIGNIFICANT CHANGE UP (ref 150–400)
POTASSIUM SERPL-MCNC: 4.7 MMOL/L — SIGNIFICANT CHANGE UP (ref 3.5–5.3)
POTASSIUM SERPL-SCNC: 4.7 MMOL/L — SIGNIFICANT CHANGE UP (ref 3.5–5.3)
RBC # BLD: 4.82 M/UL — SIGNIFICANT CHANGE UP (ref 3.8–5.2)
RBC # FLD: 14.3 % — SIGNIFICANT CHANGE UP (ref 10.3–14.5)
SODIUM SERPL-SCNC: 136 MMOL/L — SIGNIFICANT CHANGE UP (ref 135–145)
WBC # BLD: 6.13 K/UL — SIGNIFICANT CHANGE UP (ref 3.8–10.5)
WBC # FLD AUTO: 6.13 K/UL — SIGNIFICANT CHANGE UP (ref 3.8–10.5)

## 2025-01-30 RX ADMIN — APIXABAN 2.5 MILLIGRAM(S): 5 TABLET, FILM COATED ORAL at 06:48

## 2025-01-30 RX ADMIN — LACOSAMIDE 100 MILLIGRAM(S): 200 TABLET, FILM COATED ORAL at 06:48

## 2025-01-30 RX ADMIN — IPRATROPIUM BROMIDE AND ALBUTEROL SULFATE 3 MILLILITER(S): .5; 2.5 SOLUTION RESPIRATORY (INHALATION) at 09:19

## 2025-01-30 RX ADMIN — Medication 20 MILLIGRAM(S): at 06:48

## 2025-01-30 RX ADMIN — APIXABAN 2.5 MILLIGRAM(S): 5 TABLET, FILM COATED ORAL at 17:13

## 2025-01-30 RX ADMIN — OXYBUTYNIN CHLORIDE 5 MILLIGRAM(S): 5 TABLET, EXTENDED RELEASE ORAL at 11:36

## 2025-01-30 RX ADMIN — PANTOPRAZOLE 40 MILLIGRAM(S): 20 TABLET, DELAYED RELEASE ORAL at 06:48

## 2025-01-30 RX ADMIN — Medication 125 MICROGRAM(S): at 06:48

## 2025-01-30 RX ADMIN — Medication 25 MILLIGRAM(S): at 06:48

## 2025-01-30 RX ADMIN — LACOSAMIDE 100 MILLIGRAM(S): 200 TABLET, FILM COATED ORAL at 17:13

## 2025-01-30 RX ADMIN — Medication 50 MILLIGRAM(S): at 11:36

## 2025-01-30 RX ADMIN — IPRATROPIUM BROMIDE AND ALBUTEROL SULFATE 3 MILLILITER(S): .5; 2.5 SOLUTION RESPIRATORY (INHALATION) at 20:03

## 2025-01-30 RX ADMIN — IPRATROPIUM BROMIDE AND ALBUTEROL SULFATE 3 MILLILITER(S): .5; 2.5 SOLUTION RESPIRATORY (INHALATION) at 15:13

## 2025-01-30 RX ADMIN — ACETAMINOPHEN, DIPHENHYDRAMINE HCL, PHENYLEPHRINE HCL 3 MILLIGRAM(S): 325; 25; 5 TABLET ORAL at 21:42

## 2025-01-30 RX ADMIN — Medication 600 MILLIGRAM(S): at 06:48

## 2025-01-30 RX ADMIN — Medication 25 MILLIGRAM(S): at 21:42

## 2025-01-30 RX ADMIN — Medication 25 MILLIGRAM(S): at 13:11

## 2025-01-30 NOTE — DIETITIAN INITIAL EVALUATION ADULT - PROBLEM SELECTOR PLAN 3
CT Chest: Ground glass and solid density 2.2 cm nodule the right apex, with a solid   component measuring 0.9 cm. while this may be infectious in nature, a   single part solid nodule with the solid component measuring greater than   6 mm should be considered suspicious for malignancy.   Short-term 3 month follow-up versus PET CT or histopathologic correlation is advised.  f/u outpt

## 2025-01-30 NOTE — PROGRESS NOTE ADULT - SUBJECTIVE AND OBJECTIVE BOX
Date of Service 01-30-25 @ 09:59    CHIEF COMPLAINT:Patient is a 89y old  Female who presents with a chief complaint of AHRF 2/2 Flu vs PNA.Pt appears comfortable.    	  REVIEW OF SYSTEMS:  CONSTITUTIONAL: No fever, weight loss, or fatigue  EYES: No eye pain, visual disturbances, or discharge  ENT:  No difficulty hearing, tinnitus, vertigo; No sinus or throat pain  NECK: No pain or stiffness  RESPIRATORY: No cough, wheezing, chills or hemoptysis; No Shortness of Breath  CARDIOVASCULAR: No chest pain, palpitations, passing out, dizziness, or leg swelling  GASTROINTESTINAL: No abdominal or epigastric pain. No nausea, vomiting, or hematemesis; No diarrhea or constipation. No melena or hematochezia.  GENITOURINARY: No dysuria, frequency, hematuria, or incontinence  NEUROLOGICAL: No headaches, memory loss, loss of strength, numbness, or tremors  SKIN: No itching, burning, rashes, or lesions   LYMPH Nodes: No enlarged glands  ENDOCRINE: No heat or cold intolerance; No hair loss  MUSCULOSKELETAL: No joint pain or swelling; No muscle, back, or extremity pain  PSYCHIATRIC: No depression, anxiety, mood swings, or difficulty sleeping  HEME/LYMPH: No easy bruising, or bleeding gums  ALLERGY AND IMMUNOLOGIC: No hives or eczema	    PHYSICAL EXAM:  T(C): 36.3 (01-30-25 @ 05:17), Max: 36.9 (01-29-25 @ 21:00)  HR: 81 (01-30-25 @ 05:17) (75 - 104)  BP: 143/73 (01-30-25 @ 05:17) (114/62 - 149/92)  RR: 18 (01-30-25 @ 05:17) (18 - 19)  SpO2: 96% (01-30-25 @ 05:17) (94% - 99%)  Wt(kg): --  I&O's Summary    29 Jan 2025 07:01  -  30 Jan 2025 07:00  --------------------------------------------------------  IN: 0 mL / OUT: 500 mL / NET: -500 mL        Appearance: Normal	  HEENT:   Normal oral mucosa, PERRL, EOMI	  Lymphatic: No lymphadenopathy  Cardiovascular: Normal S1 S2, No JVD, No murmurs, No edema  Respiratory: Lungs clear to auscultation	  Psychiatry: A & O x 3, Mood & affect appropriate  Gastrointestinal:  Soft, Non-tender, + BS	  Skin: No rashes, No ecchymoses, No cyanosis	  Neurologic: Non-focal  Extremities: Normal range of motion, No clubbing, cyanosis or edema  Vascular: Peripheral pulses palpable 2+ bilaterally    MEDICATIONS  (STANDING):  albuterol/ipratropium for Nebulization 3 milliLiter(s) Nebulizer every 6 hours  apixaban 2.5 milliGRAM(s) Oral every 12 hours  digoxin     Tablet 125 MICROGram(s) Oral daily  furosemide    Tablet 20 milliGRAM(s) Oral daily  guaiFENesin  milliGRAM(s) Oral every 12 hours  lacosamide 100 milliGRAM(s) Oral two times a day  metoprolol tartrate 25 milliGRAM(s) Oral every 8 hours  oxybutynin XL 5 milliGRAM(s) Oral daily  pantoprazole    Tablet 40 milliGRAM(s) Oral before breakfast  sertraline 50 milliGRAM(s) Oral daily      	  	  LABS:	 	                         13.5   6.13  )-----------( 284      ( 30 Jan 2025 07:08 )             41.6     01-30    136  |  102  |  33[H]  ----------------------------<  118[H]  4.7   |  27  |  0.78    Ca    9.3      30 Jan 2025 07:08      proBNP:   Lipid Profile: Cholesterol 105  LDL --  HDL 39  TG 65  Ldl calc 52  Ratio --    HgA1c:   TSH: Thyroid Stimulating Hormone, Serum: 1.69 uU/mL (01-25 @ 07:29)  Thyroid Stimulating Hormone, Serum: 1.58 uU/mL (01-24 @ 08:27)

## 2025-01-30 NOTE — DIETITIAN INITIAL EVALUATION ADULT - OBTAIN CURRENT WEIGHT
-- DO NOT REPLY / DO NOT REPLY ALL --  -- Message is from [facility] [employer] ([facility]) --    General Patient Message: Patient needs new meter and strips please send over when  Doctor / nurse send over prescriptions .      Caller Information       Type Contact Phone/Fax    11/28/2023 11:22 AM CST Phone (Incoming) ABDIEL DORSEY (Emergency Contact) [phone]        Alternative phone number: none    Can a detailed message be left? Yes    Message Turnaround: [place]:    Refer to site's KB page for routing instructions    Please give this turnaround time to the caller:   \"You can expect to receive a response 1-3 business days after your provider's clinical team reviews the message\"               yes

## 2025-01-30 NOTE — DIETITIAN INITIAL EVALUATION ADULT - ADD RECOMMEND
1) Continue current diet / oral supplementation as medically feasible., defer diet consistency to team/SLP.  2) Encourage PO intake and honor food preferences as able.  3) Monitor PO intake, labs, weights, BM's, and skin integrity.

## 2025-01-30 NOTE — PROGRESS NOTE ADULT - SUBJECTIVE AND OBJECTIVE BOX
NP Note discussed with  Primary Attending    Patient is a 89y old  Female who presents with a chief complaint of AHRF 2/2 Flu vs PNA (30 Jan 2025 09:59)      INTERVAL HPI/OVERNIGHT EVENTS: no new complaints    MEDICATIONS  (STANDING):  albuterol/ipratropium for Nebulization 3 milliLiter(s) Nebulizer every 6 hours  apixaban 2.5 milliGRAM(s) Oral every 12 hours  digoxin     Tablet 125 MICROGram(s) Oral daily  furosemide    Tablet 20 milliGRAM(s) Oral daily  guaiFENesin  milliGRAM(s) Oral every 12 hours  lacosamide 100 milliGRAM(s) Oral two times a day  metoprolol tartrate 25 milliGRAM(s) Oral every 8 hours  oxybutynin XL 5 milliGRAM(s) Oral daily  pantoprazole    Tablet 40 milliGRAM(s) Oral before breakfast  sertraline 50 milliGRAM(s) Oral daily    MEDICATIONS  (PRN):  acetaminophen     Tablet .. 650 milliGRAM(s) Oral every 6 hours PRN Temp greater or equal to 38C (100.4F), Mild Pain (1 - 3)  benzonatate 100 milliGRAM(s) Oral every 8 hours PRN Cough  melatonin 3 milliGRAM(s) Oral at bedtime PRN Insomnia      __________________________________________________  REVIEW OF SYSTEMS:    CONSTITUTIONAL: No fever,   EYES: no acute visual disturbances  NECK: No pain or stiffness  RESPIRATORY: No cough; No shortness of breath  CARDIOVASCULAR: No chest pain, no palpitations  GASTROINTESTINAL: No pain. No nausea or vomiting; No diarrhea   NEUROLOGICAL: No headache or numbness, no tremors  MUSCULOSKELETAL: No joint pain, no muscle pain  GENITOURINARY: no dysuria, no frequency, no hesitancy  PSYCHIATRY: no depression , no anxiety  ALL OTHER  ROS negative        Vital Signs Last 24 Hrs  T(C): 36.3 (30 Jan 2025 05:17), Max: 36.9 (29 Jan 2025 21:00)  T(F): 97.3 (30 Jan 2025 05:17), Max: 98.4 (29 Jan 2025 21:00)  HR: 81 (30 Jan 2025 05:17) (75 - 104)  BP: 143/73 (30 Jan 2025 05:17) (114/62 - 149/92)  BP(mean): 79 (29 Jan 2025 23:14) (79 - 111)  RR: 18 (30 Jan 2025 05:17) (18 - 19)  SpO2: 96% (30 Jan 2025 05:17) (94% - 99%)    Parameters below as of 30 Jan 2025 05:17  Patient On (Oxygen Delivery Method): room air        ________________________________________________  PHYSICAL EXAM:  GENERAL: NAD  HEENT: Normocephalic;  conjunctivae and sclerae clear; moist mucous membranes;   NECK : supple  CHEST/LUNG: Clear to auscultation bilaterally with good air entry   HEART: S1 S2  regular; no murmurs, gallops or rubs  ABDOMEN: Soft, Nontender, Nondistended; Bowel sounds present  EXTREMITIES: no cyanosis; no edema; no calf tenderness  SKIN: warm and dry; no rash  NERVOUS SYSTEM:  Awake and alert; Oriented  to place, person; no new deficits    _________________________________________________  LABS:                        13.5   6.13  )-----------( 284      ( 30 Jan 2025 07:08 )             41.6     01-30    136  |  102  |  33[H]  ----------------------------<  118[H]  4.7   |  27  |  0.78    Ca    9.3      30 Jan 2025 07:08        Urinalysis Basic - ( 30 Jan 2025 07:08 )    Color: x / Appearance: x / SG: x / pH: x  Gluc: 118 mg/dL / Ketone: x  / Bili: x / Urobili: x   Blood: x / Protein: x / Nitrite: x   Leuk Esterase: x / RBC: x / WBC x   Sq Epi: x / Non Sq Epi: x / Bacteria: x      CAPILLARY BLOOD GLUCOSE            RADIOLOGY & ADDITIONAL TESTS:  < from: Xray Chest 1 View-PORTABLE IMMEDIATE (Xray Chest 1 View-PORTABLE IMMEDIATE .) (01.24.25 @ 17:38) >    ACC: [id number] EXAM:  XR CHEST PORTABLE IMMED 1V   ORDERED BY: KADIE BENTON     PROCEDURE DATE:  01/24/2025          INTERPRETATION:  INDICATIONS: Pneumonia, influenza, lung nodule    Prior examination for comparison: 7/2/2023, CT chest 1/22/25    Technique: AP view of chest    Findings:    Bilateral pleural effusions, right is moderate and left is trace. Right   upper lung zone hazy opacity. The cardiomediastinal silhouette is normal.   Severe bilateral glenohumeral joint disease.    IMPRESSION: Findings as above.    --- End of Report ---            JACQUELYN WALLIS MD; Attending Interventional Radiologist  This document has been electronically signed. Jan 25 2025  9:48AM    < end of copied text >    Imaging Personally Reviewed:  YES    Consultant(s) Notes Reviewed:   YES     Plan of care was discussed with patient and /or primary care giver; all questions and concerns were addressed and care was aligned with patient's wishes.

## 2025-01-30 NOTE — DIETITIAN INITIAL EVALUATION ADULT - PERTINENT MEDS FT
MEDICATIONS  (STANDING):  albuterol/ipratropium for Nebulization 3 milliLiter(s) Nebulizer every 6 hours  apixaban 2.5 milliGRAM(s) Oral every 12 hours  digoxin     Tablet 125 MICROGram(s) Oral daily  furosemide    Tablet 20 milliGRAM(s) Oral daily  guaiFENesin  milliGRAM(s) Oral every 12 hours  lacosamide 100 milliGRAM(s) Oral two times a day  metoprolol tartrate 25 milliGRAM(s) Oral every 8 hours  oxybutynin XL 5 milliGRAM(s) Oral daily  pantoprazole    Tablet 40 milliGRAM(s) Oral before breakfast  sertraline 50 milliGRAM(s) Oral daily    MEDICATIONS  (PRN):  acetaminophen     Tablet .. 650 milliGRAM(s) Oral every 6 hours PRN Temp greater or equal to 38C (100.4F), Mild Pain (1 - 3)  benzonatate 100 milliGRAM(s) Oral every 8 hours PRN Cough  melatonin 3 milliGRAM(s) Oral at bedtime PRN Insomnia

## 2025-01-30 NOTE — PROGRESS NOTE ADULT - SUBJECTIVE AND OBJECTIVE BOX
Patient is a 89y old  Female who presents with a chief complaint of AHRF 2/2 Flu vs PNA (30 Jan 2025 09:59)  Awake, alert, laying in bed in NAD. Off telemetry    INTERVAL HPI/OVERNIGHT EVENTS:      VITAL SIGNS:  T(F): 97.3 (01-30-25 @ 05:17)  HR: 81 (01-30-25 @ 05:17)  BP: 143/73 (01-30-25 @ 05:17)  RR: 18 (01-30-25 @ 05:17)  SpO2: 96% (01-30-25 @ 05:17)  Wt(kg): --  I&O's Detail    29 Jan 2025 07:01  -  30 Jan 2025 07:00  --------------------------------------------------------  IN:  Total IN: 0 mL    OUT:    Voided (mL): 500 mL  Total OUT: 500 mL    Total NET: -500 mL              REVIEW OF SYSTEMS:    CONSTITUTIONAL:  No fevers, chills, sweats    HEENT:  Eyes:  No diplopia or blurred vision. ENT:  No earache, sore throat or runny nose.    CARDIOVASCULAR:  No pressure, squeezing, tightness, or heaviness about the chest; no palpitations.    RESPIRATORY:  Per HPI    GASTROINTESTINAL:  No abdominal pain, nausea, vomiting or diarrhea.    GENITOURINARY:  No dysuria, frequency or urgency.    NEUROLOGIC:  No paresthesias, fasciculations, seizures or weakness.    PSYCHIATRIC:  No disorder of thought or mood.      PHYSICAL EXAM:    Constitutional: Well developed and nourished  Eyes:Perrla  ENMT: normal  Neck:supple  Respiratory: good air entry  Cardiovascular: S1 S2 regular  Gastrointestinal: Soft, Non tender  Extremities: No edema  Vascular:normal  Neurological:Awake, alert,Ox3  Musculoskeletal:Normal      MEDICATIONS  (STANDING):  albuterol/ipratropium for Nebulization 3 milliLiter(s) Nebulizer every 6 hours  apixaban 2.5 milliGRAM(s) Oral every 12 hours  digoxin     Tablet 125 MICROGram(s) Oral daily  furosemide    Tablet 20 milliGRAM(s) Oral daily  guaiFENesin  milliGRAM(s) Oral every 12 hours  lacosamide 100 milliGRAM(s) Oral two times a day  metoprolol tartrate 25 milliGRAM(s) Oral every 8 hours  oxybutynin XL 5 milliGRAM(s) Oral daily  pantoprazole    Tablet 40 milliGRAM(s) Oral before breakfast  sertraline 50 milliGRAM(s) Oral daily    MEDICATIONS  (PRN):  acetaminophen     Tablet .. 650 milliGRAM(s) Oral every 6 hours PRN Temp greater or equal to 38C (100.4F), Mild Pain (1 - 3)  benzonatate 100 milliGRAM(s) Oral every 8 hours PRN Cough  melatonin 3 milliGRAM(s) Oral at bedtime PRN Insomnia      Allergies    No Known Drug Allergies  Pineapple (Short breath)  Kiwi (Swelling)    Intolerances        LABS:                        13.5   6.13  )-----------( 284      ( 30 Jan 2025 07:08 )             41.6     01-30    136  |  102  |  33[H]  ----------------------------<  118[H]  4.7   |  27  |  0.78    Ca    9.3      30 Jan 2025 07:08        Urinalysis Basic - ( 30 Jan 2025 07:08 )    Color: x / Appearance: x / SG: x / pH: x  Gluc: 118 mg/dL / Ketone: x  / Bili: x / Urobili: x   Blood: x / Protein: x / Nitrite: x   Leuk Esterase: x / RBC: x / WBC x   Sq Epi: x / Non Sq Epi: x / Bacteria: x            CAPILLARY BLOOD GLUCOSE            RADIOLOGY & ADDITIONAL TESTS:    CXR:    < from: Xray Chest 1 View-PORTABLE IMMEDIATE (Xray Chest 1 View-PORTABLE IMMEDIATE .) (01.24.25 @ 17:38) >  Findings:    Bilateral pleural effusions, right is moderate and left is trace. Right   upper lung zone hazy opacity. The cardiomediastinal silhouette is normal.   Severe bilateral glenohumeral joint disease.    < end of copied text >  Ct scan chest:    ekg;    echo:

## 2025-01-30 NOTE — DIETITIAN INITIAL EVALUATION ADULT - PERTINENT LABORATORY DATA
01-30    136  |  102  |  33[H]  ----------------------------<  118[H]  4.7   |  27  |  0.78    Ca    9.3      30 Jan 2025 07:08    A1C with Estimated Average Glucose Result: 5.8 % (01-24-25 @ 08:27)

## 2025-01-30 NOTE — DIETITIAN INITIAL EVALUATION ADULT - PROBLEM SELECTOR PLAN 1
p/w hypoxia, cough, cold  req 2 L NC in ED  FLU A+  CT Chest with ground glass and signs of consolidation    f/u TTE  started Azithro, CTX  f/u PNA work up, blood cultures  wean NC as tolerated  Tamiflu started   droplet precautions

## 2025-01-30 NOTE — DIETITIAN INITIAL EVALUATION ADULT - ORAL INTAKE PTA/DIET HISTORY
89 y/o F, AAOx1 per chart. Visited pt at bedside. Will attain nutrition information through comprehensive chart review and through RN/PCA information. No new food allergies noted per chart. Unable to determine pt's PO intake PTA. No recent weight loss noted per chart. No recent episodes of nausea, vomiting, diarrhea, or constipation noted per chart. Intake <50% per tray observation, consuming % of supplements on tray. Low HDL - 39.

## 2025-01-30 NOTE — DIETITIAN INITIAL EVALUATION ADULT - NS FNS DIET ORDER
Diet, Soft and Bite Sized:   Supplement Feeding Modality:  Oral  Ensure Clear Cans or Servings Per Day:  1       Frequency:  Two Times a day (01-24-25 @ 13:25) [Active]

## 2025-01-30 NOTE — DIETITIAN INITIAL EVALUATION ADULT - ORAL NUTRITION SUPPLEMENTS
Continue current oral supplementation: Ensure Clear (provides 240 kcal, 8 gm protein per 8oz serving) BID

## 2025-01-30 NOTE — PROGRESS NOTE ADULT - SUBJECTIVE AND OBJECTIVE BOX
Patient is a 89y old  Female who presents with a chief complaint of AHRF 2/2 Flu vs PNA (29 Jan 2025 10:20)    pt seen in tele [x  ], reg med floor [   ], bed [x  ], chair at bedside [   ], a+o x 1 [ x ], lethargic [  ],    nad [x  ]        Allergies    No Known Drug Allergies  Pineapple (Short breath)  Kiwi (Swelling)        Vitals    T(F): 97.3 (01-30-25 @ 05:17), Max: 98.8 (01-29-25 @ 08:25)  HR: 81 (01-30-25 @ 05:17) (69 - 104)  BP: 143/73 (01-30-25 @ 05:17) (114/62 - 152/92)  RR: 18 (01-30-25 @ 05:17) (18 - 19)  SpO2: 96% (01-30-25 @ 05:17) (94% - 99%)  Wt(kg): --  CAPILLARY BLOOD GLUCOSE          Labs                          13.6   5.86  )-----------( 250      ( 28 Jan 2025 07:36 )             42.5       01-28    140  |  103  |  20[H]  ----------------------------<  117[H]  4.1   |  28  |  0.66    Ca    9.3      28 Jan 2025 07:36  Phos  3.5     01-28  Mg     2.0     01-28              .Blood BLOOD  01-22 @ 20:40   No growth at 5 days  --  --          Radiology Results      Meds    MEDICATIONS  (STANDING):  albuterol/ipratropium for Nebulization 3 milliLiter(s) Nebulizer every 6 hours  apixaban 2.5 milliGRAM(s) Oral every 12 hours  digoxin     Tablet 125 MICROGram(s) Oral daily  furosemide    Tablet 20 milliGRAM(s) Oral daily  guaiFENesin  milliGRAM(s) Oral every 12 hours  lacosamide 100 milliGRAM(s) Oral two times a day  metoprolol tartrate 25 milliGRAM(s) Oral every 8 hours  oxybutynin XL 5 milliGRAM(s) Oral daily  pantoprazole    Tablet 40 milliGRAM(s) Oral before breakfast  sertraline 50 milliGRAM(s) Oral daily      MEDICATIONS  (PRN):  acetaminophen     Tablet .. 650 milliGRAM(s) Oral every 6 hours PRN Temp greater or equal to 38C (100.4F), Mild Pain (1 - 3)  benzonatate 100 milliGRAM(s) Oral every 8 hours PRN Cough  melatonin 3 milliGRAM(s) Oral at bedtime PRN Insomnia      Physical Exam    Neuro :  no focal deficits  Respiratory: CTA B/L  CV: RRR, S1S2, no murmurs,   Abdominal: Soft, NT, ND +BS,  Extremities: No edema, + peripheral pulses      ASSESSMENT    Acute hypoxic respiratory failure  Pneumonia due to infectious organism  Influenza A.   Lung nodule   Pleural effusion due to CHF (congestive heart failure).  h/o A-fib on Eliquis,   HTN,   seizures,   GERD,   anxiety,   depression  Urinary incontinence      PLAN    Droplets precautions  Analgesics PRN  Robitussin 10 cc po TID PRN  completed Tamiflu 30 mgs po daily x 5 days.  CXR follow up.   blood cx neg noted above   s/p course of reocphin and zithromax   Oxygen supp to keep SPO2 90% or above  Duoneb 1 unit dose neb Q6H PRN  pulm toilet  Asp precautions  CXR with Bilateral pleural effusions, right is moderate and left is trace. Right   upper lung zone hazy opacity. The cardiomediastinal silhouette is normal.   Severe bilateral glenohumeral joint disease.     pulm cons   Quantiferon  TB gold test  Nodify Testing as OP  PFTs as OP.  Echo with Left ventricular systolic function is normal. mild (grade 1) left ventricular   diastolic dysfunction. mild pulmonary hypertension. Left pleural effusion noted.    Moderate aortic stenosis noted.    Cardio follow up.  cont eliquis 2.5mg bid,  lopressor 25mg q8h.Add dig .125mg qd.  lasix changed to 20mg po daily   phys tx eval noted and rec phys tx 3-5x/week x 3 weeks at [facility]  endo f/u   patient does not know about thyroid nodule. Not a good historian.  Physical examination is negative for Goiter  Check Thyroid ultrasound and TPO antibodies.   cont current meds          Patient is a 89y old  Female who presents with a chief complaint of AHRF 2/2 Flu vs PNA (29 Jan 2025 10:20)    pt seen in tele [  ], reg med floor [ x  ], bed [x  ], chair at bedside [   ], a+o x 1 [ x ], lethargic [  ],    nad [x  ]        Allergies    No Known Drug Allergies  Pineapple (Short breath)  Kiwi (Swelling)        Vitals    T(F): 97.3 (01-30-25 @ 05:17), Max: 98.8 (01-29-25 @ 08:25)  HR: 81 (01-30-25 @ 05:17) (69 - 104)  BP: 143/73 (01-30-25 @ 05:17) (114/62 - 152/92)  RR: 18 (01-30-25 @ 05:17) (18 - 19)  SpO2: 96% (01-30-25 @ 05:17) (94% - 99%)  Wt(kg): --  CAPILLARY BLOOD GLUCOSE          Labs                          13.6   5.86  )-----------( 250      ( 28 Jan 2025 07:36 )             42.5       01-28    140  |  103  |  20[H]  ----------------------------<  117[H]  4.1   |  28  |  0.66    Ca    9.3      28 Jan 2025 07:36  Phos  3.5     01-28  Mg     2.0     01-28    Thyroid Stimulating Hormone, Serum in AM (01.25.25 @ 07:29)   Thyroid Stimulating Hormone, Serum: 1.69           .Blood BLOOD  01-22 @ 20:40   No growth at 5 days  --  --          Radiology Results      Meds    MEDICATIONS  (STANDING):  albuterol/ipratropium for Nebulization 3 milliLiter(s) Nebulizer every 6 hours  apixaban 2.5 milliGRAM(s) Oral every 12 hours  digoxin     Tablet 125 MICROGram(s) Oral daily  furosemide    Tablet 20 milliGRAM(s) Oral daily  guaiFENesin  milliGRAM(s) Oral every 12 hours  lacosamide 100 milliGRAM(s) Oral two times a day  metoprolol tartrate 25 milliGRAM(s) Oral every 8 hours  oxybutynin XL 5 milliGRAM(s) Oral daily  pantoprazole    Tablet 40 milliGRAM(s) Oral before breakfast  sertraline 50 milliGRAM(s) Oral daily      MEDICATIONS  (PRN):  acetaminophen     Tablet .. 650 milliGRAM(s) Oral every 6 hours PRN Temp greater or equal to 38C (100.4F), Mild Pain (1 - 3)  benzonatate 100 milliGRAM(s) Oral every 8 hours PRN Cough  melatonin 3 milliGRAM(s) Oral at bedtime PRN Insomnia      Physical Exam    Neuro :  no focal deficits  Respiratory: CTA B/L  CV: RRR, S1S2, no murmurs,   Abdominal: Soft, NT, ND +BS,  Extremities: No edema, + peripheral pulses      ASSESSMENT    Acute hypoxic respiratory failure  Pneumonia due to infectious organism  Influenza A.   Lung nodule   Pleural effusion due to CHF (congestive heart failure).  h/o A-fib on Eliquis,   HTN,   seizures,   GERD,   anxiety,   depression  Urinary incontinence      PLAN    Droplets precautions  Analgesics PRN  Robitussin 10 cc po TID PRN  completed Tamiflu 30 mgs po daily x 5 days.  CXR follow up.   blood cx neg noted above   s/p course of reocphin and zithromax   pt doing well on ra  Duoneb 1 unit dose neb Q6H PRN  pulm toilet  Asp precautions  CXR with Bilateral pleural effusions, right is moderate and left is trace. Right   upper lung zone hazy opacity. The cardiomediastinal silhouette is normal.   Severe bilateral glenohumeral joint disease.     pulm cons   Quantiferon  TB gold test   Nodify Testing as OP  PFTs as OP.  Echo with Left ventricular systolic function is normal. mild (grade 1) left ventricular   diastolic dysfunction. mild pulmonary hypertension. Left pleural effusion noted.    Moderate aortic stenosis noted.    Cardio f/u.  cont eliquis 2.5mg bid,  lopressor 25mg q8h.   cont dig 0.125mg qd.  cont lasix 20mg po daily   phys tx eval noted and rec phys tx 3-5x/week x 3 weeks at [facility]  endo f/u   patient does not know about thyroid nodule. Not a good historian.  Physical examination is negative for Goiter  Check Thyroid ultrasound outpt   f/u TPO antibodies    cont current meds   d/c plan for am

## 2025-01-31 VITALS
SYSTOLIC BLOOD PRESSURE: 123 MMHG | RESPIRATION RATE: 18 BRPM | HEART RATE: 83 BPM | TEMPERATURE: 98 F | DIASTOLIC BLOOD PRESSURE: 68 MMHG | OXYGEN SATURATION: 98 %

## 2025-01-31 LAB
ALBUMIN SERPL ELPH-MCNC: 3 G/DL — LOW (ref 3.5–5)
ALP SERPL-CCNC: 115 U/L — SIGNIFICANT CHANGE UP (ref 40–120)
ALT FLD-CCNC: 24 U/L DA — SIGNIFICANT CHANGE UP (ref 10–60)
ANION GAP SERPL CALC-SCNC: 10 MMOL/L — SIGNIFICANT CHANGE UP (ref 5–17)
AST SERPL-CCNC: 21 U/L — SIGNIFICANT CHANGE UP (ref 10–40)
BILIRUB SERPL-MCNC: 0.7 MG/DL — SIGNIFICANT CHANGE UP (ref 0.2–1.2)
BUN SERPL-MCNC: 29 MG/DL — HIGH (ref 7–18)
CALCIUM SERPL-MCNC: 9.2 MG/DL — SIGNIFICANT CHANGE UP (ref 8.4–10.5)
CHLORIDE SERPL-SCNC: 104 MMOL/L — SIGNIFICANT CHANGE UP (ref 96–108)
CO2 SERPL-SCNC: 23 MMOL/L — SIGNIFICANT CHANGE UP (ref 22–31)
CREAT SERPL-MCNC: 0.84 MG/DL — SIGNIFICANT CHANGE UP (ref 0.5–1.3)
EGFR: 64 ML/MIN/1.73M2 — SIGNIFICANT CHANGE UP
GLUCOSE SERPL-MCNC: 139 MG/DL — HIGH (ref 70–99)
HCT VFR BLD CALC: 41.8 % — SIGNIFICANT CHANGE UP (ref 34.5–45)
HGB BLD-MCNC: 13.5 G/DL — SIGNIFICANT CHANGE UP (ref 11.5–15.5)
MCHC RBC-ENTMCNC: 27.5 PG — SIGNIFICANT CHANGE UP (ref 27–34)
MCHC RBC-ENTMCNC: 32.3 G/DL — SIGNIFICANT CHANGE UP (ref 32–36)
MCV RBC AUTO: 85.1 FL — SIGNIFICANT CHANGE UP (ref 80–100)
NRBC # BLD: 0 /100 WBCS — SIGNIFICANT CHANGE UP (ref 0–0)
NRBC BLD-RTO: 0 /100 WBCS — SIGNIFICANT CHANGE UP (ref 0–0)
PLATELET # BLD AUTO: 288 K/UL — SIGNIFICANT CHANGE UP (ref 150–400)
POTASSIUM SERPL-MCNC: 4.5 MMOL/L — SIGNIFICANT CHANGE UP (ref 3.5–5.3)
POTASSIUM SERPL-SCNC: 4.5 MMOL/L — SIGNIFICANT CHANGE UP (ref 3.5–5.3)
PROT SERPL-MCNC: 6.6 G/DL — SIGNIFICANT CHANGE UP (ref 6–8.3)
RBC # BLD: 4.91 M/UL — SIGNIFICANT CHANGE UP (ref 3.8–5.2)
RBC # FLD: 14.7 % — HIGH (ref 10.3–14.5)
SODIUM SERPL-SCNC: 137 MMOL/L — SIGNIFICANT CHANGE UP (ref 135–145)
WBC # BLD: 6.92 K/UL — SIGNIFICANT CHANGE UP (ref 3.8–10.5)
WBC # FLD AUTO: 6.92 K/UL — SIGNIFICANT CHANGE UP (ref 3.8–10.5)

## 2025-01-31 PROCEDURE — 93306 TTE W/DOPPLER COMPLETE: CPT

## 2025-01-31 PROCEDURE — 80048 BASIC METABOLIC PNL TOTAL CA: CPT

## 2025-01-31 PROCEDURE — 84145 PROCALCITONIN (PCT): CPT

## 2025-01-31 PROCEDURE — 71250 CT THORAX DX C-: CPT | Mod: MC

## 2025-01-31 PROCEDURE — 94640 AIRWAY INHALATION TREATMENT: CPT

## 2025-01-31 PROCEDURE — 87637 SARSCOV2&INF A&B&RSV AMP PRB: CPT

## 2025-01-31 PROCEDURE — 71045 X-RAY EXAM CHEST 1 VIEW: CPT

## 2025-01-31 PROCEDURE — 0241U: CPT

## 2025-01-31 PROCEDURE — 97110 THERAPEUTIC EXERCISES: CPT

## 2025-01-31 PROCEDURE — 85027 COMPLETE CBC AUTOMATED: CPT

## 2025-01-31 PROCEDURE — 80061 LIPID PANEL: CPT

## 2025-01-31 PROCEDURE — 80053 COMPREHEN METABOLIC PANEL: CPT

## 2025-01-31 PROCEDURE — 84439 ASSAY OF FREE THYROXINE: CPT

## 2025-01-31 PROCEDURE — 96375 TX/PRO/DX INJ NEW DRUG ADDON: CPT

## 2025-01-31 PROCEDURE — 83036 HEMOGLOBIN GLYCOSYLATED A1C: CPT

## 2025-01-31 PROCEDURE — 96374 THER/PROPH/DIAG INJ IV PUSH: CPT

## 2025-01-31 PROCEDURE — 97162 PT EVAL MOD COMPLEX 30 MIN: CPT

## 2025-01-31 PROCEDURE — 83735 ASSAY OF MAGNESIUM: CPT

## 2025-01-31 PROCEDURE — 93005 ELECTROCARDIOGRAM TRACING: CPT

## 2025-01-31 PROCEDURE — 97530 THERAPEUTIC ACTIVITIES: CPT

## 2025-01-31 PROCEDURE — 86480 TB TEST CELL IMMUN MEASURE: CPT

## 2025-01-31 PROCEDURE — 86376 MICROSOMAL ANTIBODY EACH: CPT

## 2025-01-31 PROCEDURE — 83605 ASSAY OF LACTIC ACID: CPT

## 2025-01-31 PROCEDURE — 84100 ASSAY OF PHOSPHORUS: CPT

## 2025-01-31 PROCEDURE — 83880 ASSAY OF NATRIURETIC PEPTIDE: CPT

## 2025-01-31 PROCEDURE — 85025 COMPLETE CBC W/AUTO DIFF WBC: CPT

## 2025-01-31 PROCEDURE — 86738 MYCOPLASMA ANTIBODY: CPT

## 2025-01-31 PROCEDURE — 87040 BLOOD CULTURE FOR BACTERIA: CPT

## 2025-01-31 PROCEDURE — 36415 COLL VENOUS BLD VENIPUNCTURE: CPT

## 2025-01-31 PROCEDURE — 84443 ASSAY THYROID STIM HORMONE: CPT

## 2025-01-31 PROCEDURE — 99285 EMERGENCY DEPT VISIT HI MDM: CPT

## 2025-01-31 RX ORDER — AMLODIPINE BESYLATE 5 MG
1 TABLET ORAL
Refills: 0 | DISCHARGE

## 2025-01-31 RX ADMIN — LACOSAMIDE 100 MILLIGRAM(S): 200 TABLET, FILM COATED ORAL at 05:49

## 2025-01-31 RX ADMIN — Medication 25 MILLIGRAM(S): at 05:49

## 2025-01-31 RX ADMIN — OXYBUTYNIN CHLORIDE 5 MILLIGRAM(S): 5 TABLET, EXTENDED RELEASE ORAL at 11:55

## 2025-01-31 RX ADMIN — IPRATROPIUM BROMIDE AND ALBUTEROL SULFATE 3 MILLILITER(S): .5; 2.5 SOLUTION RESPIRATORY (INHALATION) at 03:13

## 2025-01-31 RX ADMIN — APIXABAN 2.5 MILLIGRAM(S): 5 TABLET, FILM COATED ORAL at 05:49

## 2025-01-31 RX ADMIN — Medication 50 MILLIGRAM(S): at 11:55

## 2025-01-31 RX ADMIN — Medication 600 MILLIGRAM(S): at 05:49

## 2025-01-31 RX ADMIN — IPRATROPIUM BROMIDE AND ALBUTEROL SULFATE 3 MILLILITER(S): .5; 2.5 SOLUTION RESPIRATORY (INHALATION) at 09:00

## 2025-01-31 RX ADMIN — Medication 125 MICROGRAM(S): at 05:49

## 2025-01-31 RX ADMIN — Medication 20 MILLIGRAM(S): at 05:49

## 2025-01-31 RX ADMIN — PANTOPRAZOLE 40 MILLIGRAM(S): 20 TABLET, DELAYED RELEASE ORAL at 05:49

## 2025-01-31 NOTE — PROGRESS NOTE ADULT - SUBJECTIVE AND OBJECTIVE BOX
Date of Service 01-31-25 @ 10:15    CHIEF COMPLAINT:Patient is a 89y old  Female who presents with a chief complaint of AHRF 2/2 Flu vs PNA .Pt appears comfortable.    	  REVIEW OF SYSTEMS:  CONSTITUTIONAL: No fever, weight loss, or fatigue  EYES: No eye pain, visual disturbances, or discharge  ENT:  No difficulty hearing, tinnitus, vertigo; No sinus or throat pain  NECK: No pain or stiffness  RESPIRATORY: No cough, wheezing, chills or hemoptysis; No Shortness of Breath  CARDIOVASCULAR: No chest pain, palpitations, passing out, dizziness, or leg swelling  GASTROINTESTINAL: No abdominal or epigastric pain. No nausea, vomiting, or hematemesis; No diarrhea or constipation. No melena or hematochezia.  GENITOURINARY: No dysuria, frequency, hematuria, or incontinence  NEUROLOGICAL: No headaches, memory loss, loss of strength, numbness, or tremors  SKIN: No itching, burning, rashes, or lesions   LYMPH Nodes: No enlarged glands  ENDOCRINE: No heat or cold intolerance; No hair loss  MUSCULOSKELETAL: No joint pain or swelling; No muscle, back, or extremity pain  PSYCHIATRIC: No depression, anxiety, mood swings, or difficulty sleeping  HEME/LYMPH: No easy bruising, or bleeding gums  ALLERGY AND IMMUNOLOGIC: No hives or eczema	        PHYSICAL EXAM:  T(C): 36.9 (01-31-25 @ 04:56), Max: 37.2 (01-30-25 @ 20:18)  HR: 84 (01-31-25 @ 04:56) (83 - 98)  BP: 144/72 (01-31-25 @ 04:56) (115/67 - 158/68)  RR: 18 (01-31-25 @ 04:56) (18 - 18)  SpO2: 97% (01-31-25 @ 04:56) (95% - 97%)  Wt(kg): --  I&O's Summary      Appearance: Normal	  HEENT:   Normal oral mucosa, PERRL, EOMI	  Lymphatic: No lymphadenopathy  Cardiovascular: Normal S1 S2, No JVD, No murmurs, No edema  Respiratory: Lungs clear to auscultation	  Psychiatry: A & O x 3, Mood & affect appropriate  Gastrointestinal:  Soft, Non-tender, + BS	  Skin: No rashes, No ecchymoses, No cyanosis	  Neurologic: Non-focal  Extremities: Normal range of motion, No clubbing, cyanosis or edema  Vascular: Peripheral pulses palpable 2+ bilaterally    MEDICATIONS  (STANDING):  albuterol/ipratropium for Nebulization 3 milliLiter(s) Nebulizer every 6 hours  apixaban 2.5 milliGRAM(s) Oral every 12 hours  digoxin     Tablet 125 MICROGram(s) Oral daily  furosemide    Tablet 20 milliGRAM(s) Oral daily  guaiFENesin  milliGRAM(s) Oral every 12 hours  lacosamide 100 milliGRAM(s) Oral two times a day  metoprolol tartrate 25 milliGRAM(s) Oral every 8 hours  oxybutynin XL 5 milliGRAM(s) Oral daily  pantoprazole    Tablet 40 milliGRAM(s) Oral before breakfast  sertraline 50 milliGRAM(s) Oral daily    	  	  LABS:	 	                        13.5   6.92  )-----------( 288      ( 31 Jan 2025 06:20 )             41.8     01-31    137  |  104  |  29[H]  ----------------------------<  139[H]  4.5   |  23  |  0.84    Ca    9.2      31 Jan 2025 06:20    TPro  6.6  /  Alb  3.0[L]  /  TBili  0.7  /  DBili  x   /  AST  21  /  ALT  24  /  AlkPhos  115  01-31      Lipid Profile: Cholesterol 105  LDL --  HDL 39  TG 65  Ldl calc 52    TSH: Thyroid Stimulating Hormone, Serum: 1.69 uU/mL (01-25 @ 07:29)  Thyroid Stimulating Hormone, Serum: 1.58 uU/mL (01-24 @ 08:27)

## 2025-01-31 NOTE — PROGRESS NOTE ADULT - PROBLEM SELECTOR PROBLEM 2
Influenza A
Acute hypoxic respiratory failure
Influenza A
Acute hypoxic respiratory failure
Influenza A
Influenza A
Acute hypoxic respiratory failure

## 2025-01-31 NOTE — PROGRESS NOTE ADULT - PROBLEM SELECTOR PLAN 5
Tele monitoring  Con with meds  Cardio follow up.
Tele monitoring  Cont with meds  Cardio follow up.
- C/w Lopressor  - CV-Dr. Mayfield
Tele monitoring  Cont with meds  Cardio follow up.
resume home meds once med rec complete
Tele monitoring  Con with meds  Cardio follow up.
- C/w Lopressor  - CV-Dr. Mayfield
Tele monitoring  Con with meds  Cardio follow up.
- C/w Lopressor  - CV-Dr. Mayfield
Tele monitoring  Con with meds  Cardio follow up.
Tele monitoring  Con with meds  Cardio follow up.
Tele monitoring  Cont with meds  Cardio follow up.

## 2025-01-31 NOTE — PROGRESS NOTE ADULT - PROBLEM SELECTOR PROBLEM 3
Lung nodule

## 2025-01-31 NOTE — PROGRESS NOTE ADULT - PROBLEM SELECTOR PLAN 3
Quantiferon  TB gold test  Nodify Testing as OP  PFTs as OP.
- S/p CT Chest showed ground glass and solid density 2.2 cm nodule the right apex, with a solid   component measuring 0.9 cm. while this may be infectious in nature, a single part solid nodule with the solid component measuring greater than 6 mm should be considered suspicious for malignancy.   - Pt to f/u OP for repeat CT Chest in 3 month or  PET CT   - Per US Thyroid US not performed inpt.  Pt to f/u OP for Thyroid US and evaluation as noted above  - Quant pending-f/u results
- S/p CT Chest showed ground glass and solid density 2.2 cm nodule the right apex, with a solid   component measuring 0.9 cm. while this may be infectious in nature, a single part solid nodule with the solid component measuring greater than 6 mm should be considered suspicious for malignancy.   - Pt to f/u OP for repeat CT Chest in 3 month or  PET CT   - Per US Thyroid US not performed inpt.  Pt to f/u OP for Thyroid US and evaluation as noted above  - Quant pending-f/u results
Quantiferon  TB gold test  Nodify Testing as OP  PFTs as OP.
- S/p CT Chest showed ground glass and solid density 2.2 cm nodule the right apex, with a solid   component measuring 0.9 cm. while this may be infectious in nature, a single part solid nodule with the solid component measuring greater than 6 mm should be considered suspicious for malignancy.   - Pt to f/u OP for repeat CT Chest in 3 month or  PET CT   - Per US Thyroid US not performed inpt.  Pt to f/u OP for Thyroid US and evaluation as noted above  - Quant pending-NEG
Quantiferon  TB gold test  Nodify Testing as OP  PFTs as OP.
CT Chest: Ground glass and solid density 2.2 cm nodule the right apex, with a solid   component measuring 0.9 cm. while this may be infectious in nature, a   single part solid nodule with the solid component measuring greater than   6 mm should be considered suspicious for malignancy.   Short-term 3 month follow-up versus PET CT or histopathologic correlation is advised.  f/u outpt
Quantiferon  TB gold test  Nodify Testing as OP  PFTs as OP.
Quantiferon  TB gold test  Nodify Testing as OP  PFTs as OP.

## 2025-01-31 NOTE — PROGRESS NOTE ADULT - PROBLEM SELECTOR PLAN 11
Cultures noted  antibiotics  monitor temp and WBC  Follow up CXR.

## 2025-01-31 NOTE — PROGRESS NOTE ADULT - PROBLEM SELECTOR PROBLEM 5
Atrial fibrillation
HTN (hypertension)
HTN (hypertension)
Atrial fibrillation
Atrial fibrillation
HTN (hypertension)
Atrial fibrillation
HTN (hypertension)
Atrial fibrillation

## 2025-01-31 NOTE — PROGRESS NOTE ADULT - SUBJECTIVE AND OBJECTIVE BOX
Patient is a 89y old  Female who presents with a chief complaint of Pneumonia due to infectious organism     (30 Jan 2025 11:27)    pt seen in tele [  ], reg med floor [ x  ], bed [x  ], chair at bedside [   ], a+o x 1 [ x ], lethargic [  ],    nad [x  ]        Allergies    No Known Drug Allergies  Pineapple (Short breath)  Kiwi (Swelling)        Vitals    T(F): 98.4 (01-31-25 @ 04:56), Max: 98.9 (01-30-25 @ 20:18)  HR: 84 (01-31-25 @ 04:56) (83 - 98)  BP: 144/72 (01-31-25 @ 04:56) (115/67 - 158/68)  RR: 18 (01-31-25 @ 04:56) (18 - 18)  SpO2: 97% (01-31-25 @ 04:56) (95% - 97%)  Wt(kg): --  CAPILLARY BLOOD GLUCOSE          Labs                          13.5   6.13  )-----------( 284      ( 30 Jan 2025 07:08 )             41.6       01-30    136  |  102  |  33[H]  ----------------------------<  118[H]  4.7   |  27  |  0.78    Ca    9.3      30 Jan 2025 07:08              .Blood BLOOD  01-22 @ 20:40   No growth at 5 days  --  --          Radiology Results      Meds    MEDICATIONS  (STANDING):  albuterol/ipratropium for Nebulization 3 milliLiter(s) Nebulizer every 6 hours  apixaban 2.5 milliGRAM(s) Oral every 12 hours  digoxin     Tablet 125 MICROGram(s) Oral daily  furosemide    Tablet 20 milliGRAM(s) Oral daily  guaiFENesin  milliGRAM(s) Oral every 12 hours  lacosamide 100 milliGRAM(s) Oral two times a day  metoprolol tartrate 25 milliGRAM(s) Oral every 8 hours  oxybutynin XL 5 milliGRAM(s) Oral daily  pantoprazole    Tablet 40 milliGRAM(s) Oral before breakfast  sertraline 50 milliGRAM(s) Oral daily      MEDICATIONS  (PRN):  acetaminophen     Tablet .. 650 milliGRAM(s) Oral every 6 hours PRN Temp greater or equal to 38C (100.4F), Mild Pain (1 - 3)  benzonatate 100 milliGRAM(s) Oral every 8 hours PRN Cough  melatonin 3 milliGRAM(s) Oral at bedtime PRN Insomnia      Physical Exam    Neuro :  no focal deficits  Respiratory: CTA B/L  CV: RRR, S1S2, no murmurs,   Abdominal: Soft, NT, ND +BS,  Extremities: No edema, + peripheral pulses      ASSESSMENT    Acute hypoxic respiratory failure  Pneumonia due to infectious organism  Influenza A.   Lung nodule   Pleural effusion due to CHF (congestive heart failure).  h/o A-fib on Eliquis,   HTN,   seizures,   GERD,   anxiety,   depression  Urinary incontinence      PLAN    Droplets precautions  Analgesics PRN  Robitussin 10 cc po TID PRN  completed Tamiflu 30 mgs po daily x 5 days.  CXR follow up.   blood cx neg noted above   s/p course of reocphin and zithromax   pt doing well on ra  Duoneb 1 unit dose neb Q6H PRN  pulm toilet  Asp precautions  CXR with Bilateral pleural effusions, right is moderate and left is trace. Right   upper lung zone hazy opacity. The cardiomediastinal silhouette is normal.   Severe bilateral glenohumeral joint disease.     pulm cons   Quantiferon  TB gold test   Nodify Testing as OP  PFTs as OP.  Echo with Left ventricular systolic function is normal. mild (grade 1) left ventricular   diastolic dysfunction. mild pulmonary hypertension. Left pleural effusion noted.    Moderate aortic stenosis noted.    Cardio f/u.  cont eliquis 2.5mg bid,  lopressor 25mg q8h.   cont dig 0.125mg qd.  cont lasix 20mg po daily   phys tx eval noted and rec phys tx 3-5x/week x 3 weeks at [facility]  endo f/u   patient does not know about thyroid nodule. Not a good historian.  Physical examination is negative for Goiter  Check Thyroid ultrasound outpt   f/u TPO antibodies    cont current meds   d/c plan for am         Patient is a 89y old  Female who presents with a chief complaint of Pneumonia due to infectious organism     (30 Jan 2025 11:27)    pt seen in tele [  ], reg med floor [ x  ], bed [x  ], chair at bedside [   ], a+o x 1 [ x ], lethargic [  ],    nad [x  ]        Allergies    No Known Drug Allergies  Pineapple (Short breath)  Kiwi (Swelling)        Vitals    T(F): 98.4 (01-31-25 @ 04:56), Max: 98.9 (01-30-25 @ 20:18)  HR: 84 (01-31-25 @ 04:56) (83 - 98)  BP: 144/72 (01-31-25 @ 04:56) (115/67 - 158/68)  RR: 18 (01-31-25 @ 04:56) (18 - 18)  SpO2: 97% (01-31-25 @ 04:56) (95% - 97%)  Wt(kg): --  CAPILLARY BLOOD GLUCOSE          Labs                          13.5   6.13  )-----------( 284      ( 30 Jan 2025 07:08 )             41.6       01-30    136  |  102  |  33[H]  ----------------------------<  118[H]  4.7   |  27  |  0.78    Ca    9.3      30 Jan 2025 07:08    Thyroperoxidase Antibody (01.29.25 @ 07:49)   Thyroperoxidase Antibody: 26.0:  Free Thyroxine, Serum in AM (01.29.25 @ 07:49)   Free Thyroxine, Serum: 1.3         .Blood BLOOD  01-22 @ 20:40   No growth at 5 days  --  --          Radiology Results      Meds    MEDICATIONS  (STANDING):  albuterol/ipratropium for Nebulization 3 milliLiter(s) Nebulizer every 6 hours  apixaban 2.5 milliGRAM(s) Oral every 12 hours  digoxin     Tablet 125 MICROGram(s) Oral daily  furosemide    Tablet 20 milliGRAM(s) Oral daily  guaiFENesin  milliGRAM(s) Oral every 12 hours  lacosamide 100 milliGRAM(s) Oral two times a day  metoprolol tartrate 25 milliGRAM(s) Oral every 8 hours  oxybutynin XL 5 milliGRAM(s) Oral daily  pantoprazole    Tablet 40 milliGRAM(s) Oral before breakfast  sertraline 50 milliGRAM(s) Oral daily      MEDICATIONS  (PRN):  acetaminophen     Tablet .. 650 milliGRAM(s) Oral every 6 hours PRN Temp greater or equal to 38C (100.4F), Mild Pain (1 - 3)  benzonatate 100 milliGRAM(s) Oral every 8 hours PRN Cough  melatonin 3 milliGRAM(s) Oral at bedtime PRN Insomnia      Physical Exam    Neuro :  no focal deficits  Respiratory: CTA B/L  CV: RRR, S1S2, no murmurs,   Abdominal: Soft, NT, ND +BS,  Extremities: No edema, + peripheral pulses      ASSESSMENT    Acute hypoxic respiratory failure  Pneumonia due to infectious organism  Influenza A.   Lung nodule   Pleural effusion due to CHF (congestive heart failure).  h/o A-fib on Eliquis,   HTN,   seizures,   GERD,   anxiety,   depression  Urinary incontinence      PLAN    Droplets precautions  Analgesics PRN  Robitussin 10 cc po TID PRN  completed Tamiflu 30 mgs po daily x 5 days.  CXR follow up.   blood cx neg noted above   s/p course of reocphin and zithromax   pt doing well on ra  Duoneb 1 unit dose neb Q6H PRN  pulm toilet  Asp precautions  CXR with Bilateral pleural effusions, right is moderate and left is trace. Right   upper lung zone hazy opacity. The cardiomediastinal silhouette is normal.   Severe bilateral glenohumeral joint disease.     pulm cons   Quantiferon  TB gold test   Nodify Testing as OP  PFTs as OP.  Echo with Left ventricular systolic function is normal. mild (grade 1) left ventricular   diastolic dysfunction. mild pulmonary hypertension. Left pleural effusion noted.    Moderate aortic stenosis noted.    Cardio f/u.  cont eliquis 2.5mg bid,  lopressor 25mg q8h.   cont dig 0.125mg qd.  cont lasix 20mg po daily   phys tx eval noted and rec phys tx 3-5x/week x 3 weeks at [facility]   family refused EDUARDA   endo f/u   patient does not know about thyroid nodule. Not a good historian.  Physical examination is negative for Goiter  Check Thyroid ultrasound outpt   TPO antibodies, free t4 neg noted above    cont current meds   pt stable for d/c

## 2025-01-31 NOTE — PROGRESS NOTE ADULT - PROBLEM SELECTOR PROBLEM 4
Atrial fibrillation
Pleural effusion due to CHF (congestive heart failure)
Atrial fibrillation
Pleural effusion due to CHF (congestive heart failure)

## 2025-01-31 NOTE — DISCHARGE NOTE NURSING/CASE MANAGEMENT/SOCIAL WORK - FINANCIAL ASSISTANCE
[facility] provides services at a reduced cost to those who are determined to be eligible through [facility]’s financial assistance program. Information regarding [facility]’s financial assistance program can be found by going to [email or web address] or by calling [phone].

## 2025-01-31 NOTE — PROGRESS NOTE ADULT - ASSESSMENT
89-year-old female, AAOx2-3,  with history of A-fib on Eliquis, HTN, seizures, GERD, anxiety, depression who presents to the ED for cough, cold, hypoxia,afib,Flu,pneumonia.  1.PT.  2.Afib- eliquis 2.5mg bid,  lopressor 25mg q8h, dig .125mg qd.  3.Flu-ABX completed.  4.Pneumonia-ABX completed.  5.Pleural effusion- lasix 20mg qd.  6.PPI.  
89-year-old female, AAOx2-3,  with history of A-fib on Eliquis, HTN, seizures, GERD, anxiety, depression who presents to the ED for cough, cold, hypoxia,afib,Flu,pneumonia.  1.Tele monitoring.  2.Afib- eliquis 2.5mg bid,  lopressor 25mg q8h.Add dig .125mg qd.  3.Flu-ABX.  4.Pneumonia-ABX.  5.Pleural effusion-IV lasix 20mg bid.  6.Echocardiogram as above.  7.PPI.  
89-year-old female, AAOx2-3,  with history of A-fib on Eliquis, HTN, seizures, GERD, anxiety, depression who presents to the ED for cough, cold, hypoxia,afib,Flu,pneumonia.  1.Tele monitoring.  2.Afib- eliquis 2.5mg bid, inc lopressor 25mg q8h.  3.Flu-ABX.  4.Pneumonia-ABX.  5.Pleural effusion-IV lasix 20mg bid.  6.Echocardiogram.  7.PPI.  8.Replace k+.  
89-year-old female, AAOx2-3,  with history of A-fib on Eliquis, HTN, seizures, GERD, anxiety, depression who presents to the ED for cough, cold, hypoxia,afib,Flu,pneumonia.  1.PT.  2.Afib- eliquis 2.5mg bid,  lopressor 25mg q8h, dig .125mg qd.  3.Flu-ABX completed.  4.Pneumonia-ABX completed.  5.Pleural effusion- lasix 20mg qd.  6.PPI.  
89-year-old female, AAOx2-3,  with history of A-fib on Eliquis, HTN, seizures, GERD, anxiety, depression who presents to the ED for cough, cold, hypoxia,afib,Flu,pneumonia.  1.Tele monitoring.  2.Afib- eliquis 2.5mg bid,  lopressor 25mg q8h, dig .125mg qd.  3.Flu-ABX.  4.Pneumonia-ABX.  5.Pleural effusion-IV lasix change to po 20mg qd.  6.PPI.  
89-year-old female, AAOx2-3,  with history of A-fib on Eliquis, HTN, seizures, GERD, anxiety, depression who presents to the ED for cough, cold, hypoxia,afib,Flu,pneumonia.  1.Tele monitoring.  2.Afib- eliquis 2.5mg bid,  lopressor 25mg q8h.Add dig .125mg qd.  3.Flu-ABX.  4.Pneumonia-ABX.  5.Pleural effusion-IV lasix 20mg bid.  6.Echocardiogram as above.  7.PPI.  
89-year-old female, AAOx2-3,  with history of A-fib on Eliquis, HTN, seizures, GERD, anxiety, depression who presents to the ED for cough, cold, hypoxia,afib,Flu,pneumonia.  1.PT.  2.Afib- eliquis 2.5mg bid,  lopressor 25mg q8h, dig .125mg qd.  3.Flu-ABX completed.  4.Pneumonia-ABX completed.  5.Pleural effusion- lasix 20mg qd.  6.PPI.  
89 year old, female, with history of Afib (on Eliquis), HTN, seizures, GERD, anxiety, & depression.  Presented for cough, cold, & hypoxia.  Admitted for AHRF 2/2 PNA & Inf A.        
89-year-old female, AAOx2-3,  with history of A-fib on Eliquis, HTN, seizures, GERD, anxiety, depression who presents to the ED for cough, cold, hypoxia,afib,Flu,pneumonia.  1.D/C Tele monitoring.  2.Afib- eliquis 2.5mg bid,  lopressor 25mg q8h, dig .125mg qd.  3.Flu-ABX.  4.Pneumonia-ABX completed.  5.Pleural effusion- lasix 20mg qd.  6.PPI.  
89 year old, female, with history of Afib (on Eliquis), HTN, seizures, GERD, anxiety, & depression.  Presented for cough, cold, & hypoxia.  Admitted for AHRF 2/2 PNA & Inf A.        
89 year old, female, with history of Afib (on Eliquis), HTN, seizures, GERD, anxiety, & depression.  Presented for cough, cold, & hypoxia.    Admitted for AHRF 2/2 PNA & Inf A.   PT rec's EDUARDA family declined  Will need to follow up outpt with Endo for Thyroid nodule      
89-year-old female, AAOx2-3,  with history of A-fib on Eliquis, HTN, seizures, GERD, anxiety, depression who presents to the ED for cough, cold, hypoxia.    Adm to medicine for AHRF 2/2 Flu A vs PNA  GOC DNR/intubate   + Influenza A   1/24 CT +  Bilateral compressive and linear atelectasis. There is a 2.2 cm right apical nodule comprised of groundglass and solid density. a 1.6 cm partially calcified right thyroid nodule..small to moderate-sized b/l pleural effusions.            Pulm with rec;s Started on Azith/ceft Chest Xray, Solumedrol, Duo nebs, Lasix            Cards Afib- Eliquis 2.5mg bid, inc lopressor 25mg q8h.            ENDO rec's Thyroid US,   f/u TTE, Chest Xray, Thyroid US, A1c, TSH, Bcx, PT, QuantiFeron     Pt daughter Waleska contacted and updated on plan of care. reports pt has Hearing aids, Lower dentures and ambulates with a Can at home.

## 2025-01-31 NOTE — DISCHARGE NOTE NURSING/CASE MANAGEMENT/SOCIAL WORK - PATIENT PORTAL LINK FT
You can access the FollowMyHealth Patient Portal offered by [facility] by registering at the following website: [email or web address]. By joining [employer]’s FollowMyHealth portal, you will also be able to view your health information using other applications (apps) compatible with our system.

## 2025-01-31 NOTE — DISCHARGE NOTE NURSING/CASE MANAGEMENT/SOCIAL WORK - NSDCVIVACCINE_GEN_ALL_CORE_FT
Tdap; 28-Feb-2023 09:01; Aria Barr (MAREN); [employer]; [id number] (Exp. Date: 06-Jan-2024); IntraMuscular; Deltoid Left.; 0.5 milliLiter(s); VIS (VIS Published: 09-May-2013, VIS Presented: 28-Feb-2023);

## 2025-01-31 NOTE — PROGRESS NOTE ADULT - PROVIDER SPECIALTY LIST ADULT
Cardiology
Cardiology
Endocrinology
Internal Medicine
Internal Medicine
Cardiology
Internal Medicine
Cardiology
Internal Medicine
Pulmonology
Internal Medicine
Pulmonology
Pulmonology
Internal Medicine
Pulmonology
Internal Medicine

## 2025-01-31 NOTE — PROGRESS NOTE ADULT - PROBLEM SELECTOR PLAN 2
Oxygen supp to keep SPO2 90% or above  Duoneb 1 unit dose neb Q6H RI  Steroids  po  pulm toilet  Asp precautions  CXR follow up.

## 2025-01-31 NOTE — PROGRESS NOTE ADULT - PROBLEM SELECTOR PROBLEM 1
Influenza A
Influenza A
Acute hypoxic respiratory failure
Acute hypoxic respiratory failure
Influenza A
Acute hypoxic respiratory failure
Influenza A
Acute hypoxic respiratory failure
Influenza A

## 2025-01-31 NOTE — PROGRESS NOTE ADULT - PROBLEM SELECTOR PROBLEM 11
R/O PNA (pneumonia)

## 2025-01-31 NOTE — PROGRESS NOTE ADULT - PROBLEM SELECTOR PLAN 1
Droplets precautions  Analgesics PRN  Robitussin 10 cc po TID PRN  CXR follow up.
- P/w Hypoxia, cough, & cold.  Pt w/ known sick contacts at home dtr & grandson sick w/ Inf    - (+) Inf A  - S/p CT Chest showed ground glass and consolidation  - C/w Tamiflu   - Echo showed mild (grade 1) left ventricular diastolic dysfunction, dilated right atrium, moderately dilated left atrium, moderate mitral regurgitation, moderate tricuspid regurgitation, mild aortic, mild pulmonic regurgitation, regurgitation, moderate aortic stenosis & Left pleural.    - S/p Azithromycin  - Currently completing Ceftriaxone, last day  - Blood cx and mycoplasma negative
- P/w Hypoxia, cough, & cold.  Pt w/ known sick contacts at home dtr & grandson sick w/ Inf    - (+) Inf A  - S/p CT Chest showed ground glass and consolidation  - C/w Tamiflu   - Echo showed mild (grade 1) left ventricular diastolic dysfunction, dilated right atrium, moderately dilated left atrium, moderate mitral regurgitation, moderate tricuspid regurgitation, mild aortic, mild pulmonic regurgitation, regurgitation, moderate aortic stenosis & Left pleural.    - S/p Azithromycin & Ceftriaxone  - Blood cx and mycoplasma negative
Droplets precautions  Analgesics PRN  Robitussin 10 cc po TID PRN  CXR follow up.
p/w hypoxia, cough, cold  req 2 L NC in ED  FLU A+  CT Chest with ground glass and signs of consolidation    f/u TTE  started Azithro, CTX  f/u PNA work up, blood cultures  wean NC as tolerated  Tamiflu started   droplet precautions
Droplets precautions  Analgesics PRN  Robitussin 10 cc po TID PRN  Tamiflu 30 mgs po daily x 5 days.  CXR follow up.
Droplets precautions  Analgesics PRN  Robitussin 10 cc po TID PRN  CXR follow up.
- P/w Hypoxia, cough, & cold.  Pt w/ known sick contacts at home dtr & grandson sick w/ Inf    - (+) Inf A  - S/p CT Chest showed ground glass and consolidation  - C/w Tamiflu   - Echo showed mild (grade 1) left ventricular diastolic dysfunction, dilated right atrium, moderately dilated left atrium, moderate mitral regurgitation, moderate tricuspid regurgitation, mild aortic, mild pulmonic regurgitation, regurgitation, moderate aortic stenosis & Left pleural.    - S/p Azithromycin & Ceftriaxone  - Blood cx and mycoplasma negative
Droplets precautions  Analgesics PRN  Robitussin 10 cc po TID PRN  Tamiflu 30 mgs po daily x 5 days.  CXR follow up.

## 2025-01-31 NOTE — PROGRESS NOTE ADULT - SUBJECTIVE AND OBJECTIVE BOX
Patient is a 89y old  Female who presents with a chief complaint of AHRF 2/2 Flu vs PNA (31 Jan 2025 10:14)  Awake, alert, comfortable in bed in NAD. Doing well on RA    INTERVAL HPI/OVERNIGHT EVENTS:      VITAL SIGNS:  T(F): 98.4 (01-31-25 @ 04:56)  HR: 84 (01-31-25 @ 04:56)  BP: 144/72 (01-31-25 @ 04:56)  RR: 18 (01-31-25 @ 04:56)  SpO2: 97% (01-31-25 @ 04:56)  Wt(kg): --  I&O's Detail          REVIEW OF SYSTEMS:    CONSTITUTIONAL:  No fevers, chills, sweats    HEENT:  Eyes:  No diplopia or blurred vision. ENT:  No earache, sore throat or runny nose.    CARDIOVASCULAR:  No pressure, squeezing, tightness, or heaviness about the chest; no palpitations.    RESPIRATORY:  Per HPI    GASTROINTESTINAL:  No abdominal pain, nausea, vomiting or diarrhea.    GENITOURINARY:  No dysuria, frequency or urgency.    NEUROLOGIC:  No paresthesias, fasciculations, seizures or weakness.    PSYCHIATRIC:  No disorder of thought or mood.      PHYSICAL EXAM:    Constitutional: Well developed and nourished  Eyes:Perrla  ENMT: normal  Neck:supple  Respiratory: good air entry  Cardiovascular: S1 S2 regular  Gastrointestinal: Soft, Non tender  Extremities: No edema  Vascular:normal  Neurological:Awake, alert,Ox3  Musculoskeletal:Normal      MEDICATIONS  (STANDING):  albuterol/ipratropium for Nebulization 3 milliLiter(s) Nebulizer every 6 hours  apixaban 2.5 milliGRAM(s) Oral every 12 hours  digoxin     Tablet 125 MICROGram(s) Oral daily  furosemide    Tablet 20 milliGRAM(s) Oral daily  guaiFENesin  milliGRAM(s) Oral every 12 hours  lacosamide 100 milliGRAM(s) Oral two times a day  metoprolol tartrate 25 milliGRAM(s) Oral every 8 hours  oxybutynin XL 5 milliGRAM(s) Oral daily  pantoprazole    Tablet 40 milliGRAM(s) Oral before breakfast  sertraline 50 milliGRAM(s) Oral daily    MEDICATIONS  (PRN):  acetaminophen     Tablet .. 650 milliGRAM(s) Oral every 6 hours PRN Temp greater or equal to 38C (100.4F), Mild Pain (1 - 3)  benzonatate 100 milliGRAM(s) Oral every 8 hours PRN Cough  melatonin 3 milliGRAM(s) Oral at bedtime PRN Insomnia      Allergies    No Known Drug Allergies  Pineapple (Short breath)  Kiwi (Swelling)    Intolerances        LABS:                        13.5   6.92  )-----------( 288      ( 31 Jan 2025 06:20 )             41.8     01-31    137  |  104  |  29[H]  ----------------------------<  139[H]  4.5   |  23  |  0.84    Ca    9.2      31 Jan 2025 06:20    TPro  6.6  /  Alb  3.0[L]  /  TBili  0.7  /  DBili  x   /  AST  21  /  ALT  24  /  AlkPhos  115  01-31      Urinalysis Basic - ( 31 Jan 2025 06:20 )    Color: x / Appearance: x / SG: x / pH: x  Gluc: 139 mg/dL / Ketone: x  / Bili: x / Urobili: x   Blood: x / Protein: x / Nitrite: x   Leuk Esterase: x / RBC: x / WBC x   Sq Epi: x / Non Sq Epi: x / Bacteria: x            CAPILLARY BLOOD GLUCOSE            RADIOLOGY & ADDITIONAL TESTS:    CXR:    Ct scan chest:    ekg;    echo:

## 2025-01-31 NOTE — PROGRESS NOTE ADULT - REASON FOR ADMISSION
AHRF 2/2 Flu vs PNA

## 2025-04-08 NOTE — ED ADULT NURSE NOTE - WILL THE PATIENT ACCEPT THE PFIZER COVID-19 VACCINE IF ELIGIBLE AND IT IS AVAILABLE?
It appears Dr. Rahman saw pt on yesterday and was suppose to prescribe an antibody for ear infection but I don't see where anything was ordered. Please advise.  
No